# Patient Record
Sex: FEMALE | Race: WHITE | NOT HISPANIC OR LATINO | Employment: OTHER | URBAN - METROPOLITAN AREA
[De-identification: names, ages, dates, MRNs, and addresses within clinical notes are randomized per-mention and may not be internally consistent; named-entity substitution may affect disease eponyms.]

---

## 2017-05-15 ENCOUNTER — TELEPHONE (OUTPATIENT)
Dept: INTERNAL MEDICINE | Facility: CLINIC | Age: 67
End: 2017-05-15

## 2017-05-15 NOTE — TELEPHONE ENCOUNTER
**Vaccinated for measles? Unknown, had disease  **Internations travel in the past 30 days:  Yes, [place], [place]    Where to?  **Exposure to measles: no    Who:    When:  **Do you work in or go to a day care center? no  **Symptoms:     Fever  no  - How long?  - How high?  - What other symptoms along with the fever?    Rash  no  - How long?  - Unusual for you?  - Exposed to any new fabric or plants or laundry detergent etc?  - What does it look like?  - Where did it start? (concerning if started on the scalp)    Runny nose/Red eyes     no  - How long?  - Usual for you?  - Seasonal or chemical exposure?    Sore throat    no  - How long?  **Other considerations? cough    Recommendation:  Negative  continue scheduling    Negative  -  takes the call back    If positive: Contact FARHAT Montgomery (I.P. M-F 7-4:30, Vandana after 4:30 & W/E)  - I.P. -  [phone]  - Vandana - [phone]

## 2017-05-16 ENCOUNTER — OFFICE VISIT (OUTPATIENT)
Dept: FAMILY MEDICINE | Facility: CLINIC | Age: 67
End: 2017-05-16
Attending: FAMILY MEDICINE
Payer: COMMERCIAL

## 2017-05-16 VITALS
HEIGHT: 62 IN | SYSTOLIC BLOOD PRESSURE: 135 MMHG | HEART RATE: 67 BPM | BODY MASS INDEX: 30.73 KG/M2 | DIASTOLIC BLOOD PRESSURE: 71 MMHG | WEIGHT: 167 LBS

## 2017-05-16 DIAGNOSIS — H65.03 BILATERAL ACUTE SEROUS OTITIS MEDIA, RECURRENCE NOT SPECIFIED: ICD-10-CM

## 2017-05-16 DIAGNOSIS — J40 BRONCHITIS: Primary | ICD-10-CM

## 2017-05-16 PROCEDURE — 99212 OFFICE O/P EST SF 10 MIN: CPT | Mod: ZF

## 2017-05-16 RX ORDER — AMOXICILLIN 875 MG
875 TABLET ORAL 2 TIMES DAILY
Qty: 14 TABLET | Refills: 1 | Status: SHIPPED | OUTPATIENT
Start: 2017-05-16 | End: 2017-09-05

## 2017-05-16 ASSESSMENT — PAIN SCALES - GENERAL: PAINLEVEL: NO PAIN (0)

## 2017-05-16 NOTE — PROGRESS NOTES
Alaina is a 67 year old female who presents with plugged ears and worsening cough   HPI:  May 2nd flying home from [place] and ears plugged up and remain plugged.  Cough started a couple days before getting on return trip from [place].     Was in [place] and [place] in late April -Mary 2nd.     Still smoking    No fever or chills but cough is deeper    Doesn't feel wheezy  ROS:  General: none  Head/Eyes: none  Ears/Nose/Throat: ears plugged  Cardiovascular: none  Respiratory:cough  Gastrointestinal: none  Skin: none  Neurological: none  Mental Health: none  Endocrine: none  PROBLEM LIST:  Patient Active Problem List   Diagnosis     Carpal tunnel syndrome     Insomnia     Problem with sexual function     Hyperlipidemia LDL goal <130     Hyperlipidemia with target LDL less than 130     Nicotine addiction   OB/GYN HISTORY:   Obstetric History     No data available      PAST MEDICAL HISTORY:  Past Medical History:   Diagnosis Date     Basal cell carcinoma      Carpal tunnel syndrome      Hyperlipidemia LDL goal < 130      Insomnia, unspecified      Nicotine addiction      Problems with sexual function    Life Style Modifiers:   Tobacco:  reports that she has been smoking Cigarettes.  She has a 9.00 pack-year smoking history. She has never used smokeless tobacco.   Alcohol:  reports that she drinks alcohol.   Drug use:  reports that she uses illicit drugs, including Marijuana.  PAST SURGICAL HISTORY:  Past Surgical History:   Procedure Laterality Date     BIOPSY       MOHS MICROGRAPHIC PROCEDURE     FAMILY HISTORY:  Family History   Problem Relation Age of Onset     Arthritis Paternal Grandmother      Eye Disorder Paternal Grandmother      CANCER Father      lung at age 70     Hypertension Maternal Grandmother      DIABETES Maternal Grandmother      Melanoma No family hx of      Skin Cancer No family hx of    SOCIAL HISTORY:  Social History     Social History     Marital status:      Spouse name: N/A     Number of  "children: N/A     Years of education: N/A     Occupational History     Not on file.     Social History Main Topics     Smoking status: Light Tobacco Smoker     Packs/day: 0.30     Years: 30.00     Types: Cigarettes     Smokeless tobacco: Never Used      Comment: 2 packs per week     Alcohol use 0.0 oz/week     0 Standard drinks or equivalent per week      Comment: socially  3 nights a week      Drug use: Yes     Special: Marijuana     Sexual activity: No   MEDICATIONS:  Current Outpatient Prescriptions   Medication Sig Dispense Refill     clobetasol (TEMOVATE) 0.05 % ointment Apply as needed to affected areas of hands 2x daily 60 g 1     Calcium Carbonate-Vit D-Min (CALCIUM 600 + MINERALS PO) 1 tablet daily       zolpidem (AMBIEN) 10 MG tablet Take 0.5 tablets (5 mg) by mouth nightly as needed For sleep 30 tablet 1     B Complex Vitamins (VITAMIN B COMPLEX PO)        ascorbic acid (VITAMIN C) 500 MG tablet Take 1 tablet by mouth daily.     ALLERGIES:  No known allergies  VITALS:  /71  Pulse 67  Ht 5' 1.75\" (156.8 cm)  Wt 167 lb (75.8 kg)  BMI 30.79 kg/m2  PHYSICAL EXAM:  Constitutional: Well appearing woman in no acute distress.   Psychological: appropriate mood.  Eyes: anicteric, normal extra-ocular movements,  pupils are equal and reactive to light.   Ears, Nose and Throat: tympanic membranes with fluid behind TM.  Right is bulging  But no redness.  throat clear, moist mucous membrames, neck supple with full range of motion.  Neck: No thyroidmegaly. Cardiovascular: regular rate and rhythm, normal S1 and S2, no murmurs, rubs or gallops, peripheral pulses full and symmetric   Respiratory: clear to auscultation, no wheezes or crackles, normal breath sounds.  Musculoskeletal: full range of motion    Skin: no concerning lesions, no jaundice.  Neurological: normal gait, no tremor.   Diagnoses and associated orders for this visit:  There are no diagnoses linked to this encounter.  Diagnoses and associated " orders for this visit:  Bronchitis in a smoker  -     amoxicillin (AMOXIL) 875 MG tablet; Take 1 tablet (875 mg) by mouth 2 times daily  -    Encouraged discontinuation of  Cigarette smoking  Bilateral acute serous otitis media, recurrence not specified   -     OTOLARYNGOLOGY REFERRAL if not improved in 2 weeks.

## 2017-05-16 NOTE — LETTER
5/16/2017       RE: Deborah Schoenholz  [address]  [facility] [address]     Dear Colleague,    Thank you for referring your patient, Deborah Schoenholz, to the [facility] at [facility]. Please see a copy of my visit note below.    Alaina is a 67 year old female who presents with plugged ears and worsening cough   HPI:  May 2nd flying home from [place] and ears plugged up and remain plugged.  Cough started a couple days before getting on return trip from [place].     Was in [place] and [place] in late April -[place] 2nd.     Still smoking    No fever or chills but cough is deeper    Doesn't feel wheezy  ROS:  General: none  Head/Eyes: none  Ears/Nose/Throat: ears plugged  Cardiovascular: none  Respiratory:cough  Gastrointestinal: none  Skin: none  Neurological: none  Mental Health: none  Endocrine: none  PROBLEM LIST:  Patient Active Problem List   Diagnosis     Carpal tunnel syndrome     Insomnia     Problem with sexual function     Hyperlipidemia LDL goal <130     Hyperlipidemia with target LDL less than 130     Nicotine addiction   OB/GYN HISTORY:   Obstetric History     No data available      PAST MEDICAL HISTORY:  Past Medical History:   Diagnosis Date     Basal cell carcinoma      Carpal tunnel syndrome      Hyperlipidemia LDL goal < 130      Insomnia, unspecified      Nicotine addiction      Problems with sexual function    Life Style Modifiers:   Tobacco:  reports that she has been smoking Cigarettes.  She has a 9.00 pack-year smoking history. She has never used smokeless tobacco.   Alcohol:  reports that she drinks alcohol.   Drug use:  reports that she uses illicit drugs, including Marijuana.  PAST SURGICAL HISTORY:  Past Surgical History:   Procedure Laterality Date     BIOPSY       MOHS MICROGRAPHIC PROCEDURE     FAMILY HISTORY:  Family History   Problem Relation Age of Onset     Arthritis Paternal Grandmother      Eye Disorder Paternal  "Grandmother      CANCER Father      lung at age 70     Hypertension Maternal Grandmother      DIABETES Maternal Grandmother      Melanoma No family hx of      Skin Cancer No family hx of    SOCIAL HISTORY:  Social History     Social History     Marital status:      Spouse name: N/A     Number of children: N/A     Years of education: N/A     Occupational History     Not on file.     Social History Main Topics     Smoking status: Light Tobacco Smoker     Packs/day: 0.30     Years: 30.00     Types: Cigarettes     Smokeless tobacco: Never Used      Comment: 2 packs per week     Alcohol use 0.0 oz/week     0 Standard drinks or equivalent per week      Comment: socially  3 nights a week      Drug use: Yes     Special: Marijuana     Sexual activity: No   MEDICATIONS:  Current Outpatient Prescriptions   Medication Sig Dispense Refill     clobetasol (TEMOVATE) 0.05 % ointment Apply as needed to affected areas of hands 2x daily 60 g 1     Calcium Carbonate-Vit D-Min (CALCIUM 600 + MINERALS PO) 1 tablet daily       zolpidem (AMBIEN) 10 MG tablet Take 0.5 tablets (5 mg) by mouth nightly as needed For sleep 30 tablet 1     B Complex Vitamins (VITAMIN B COMPLEX PO)        ascorbic acid (VITAMIN C) 500 MG tablet Take 1 tablet by mouth daily.     ALLERGIES:  No known allergies  VITALS:  /71  Pulse 67  Ht 5' 1.75\" (156.8 cm)  Wt 167 lb (75.8 kg)  BMI 30.79 kg/m2  PHYSICAL EXAM:  Constitutional: Well appearing woman in no acute distress.   Psychological: appropriate mood.  Eyes: anicteric, normal extra-ocular movements,  pupils are equal and reactive to light.   Ears, Nose and Throat: tympanic membranes with fluid behind TM.  Right is bulging  But no redness.  throat clear, moist mucous membrames, neck supple with full range of motion.  Neck: No thyroidmegaly. Cardiovascular: regular rate and rhythm, normal S1 and S2, no murmurs, rubs or gallops, peripheral pulses full and symmetric   Respiratory: clear to " auscultation, no wheezes or crackles, normal breath sounds.  Musculoskeletal: full range of motion    Skin: no concerning lesions, no jaundice.  Neurological: normal gait, no tremor.   Diagnoses and associated orders for this visit:  There are no diagnoses linked to this encounter.  Diagnoses and associated orders for this visit:  Bronchitis in a smoker  -     amoxicillin (AMOXIL) 875 MG tablet; Take 1 tablet (875 mg) by mouth 2 times daily  -    Encouraged discontinuation of  Cigarette smoking  Bilateral acute serous otitis media, recurrence not specified   -     OTOLARYNGOLOGY REFERRAL if not improved in 2 weeks.             Again, thank you for allowing me to participate in the care of your patient.      Sincerely,    Анна Giraldo MD

## 2017-05-16 NOTE — MR AVS SNAPSHOT
After Visit Summary   5/16/2017    Deborah Schoenholz    MRN: [id number]           Patient Information     Date Of Birth          1950        Visit Information        Provider Department      5/16/2017 9:00 AM Анна Giraldo MD [facility]        Today's Diagnoses     Bronchitis    -  1    Bilateral acute serous otitis media, recurrence not specified          Care Instructions     [phone] ENT [[facility]]        Follow-ups after your visit        Additional Services     OTOLARYNGOLOGY REFERRAL       Your provider has referred you to: [facility]: Adult Ear, Nose [facility] Throat Clinic (Otolaryngology) [facility] [phone]  [email or web address]    Please be aware that coverage of these services is subject to the terms and limitations of your health insurance plan.  Call member services at your health plan with any benefit or coverage questions.      Please bring the following with you to your appointment:    (1) Any X-Rays, CTs or MRIs which have been performed.  Contact the facility where they were done to arrange for  prior to your scheduled appointment.   (2) List of current medications  (3) This referral request   (4) Any documents/labs given to you for this referral                  Who to contact     Please call your clinic at [phone] to:    Ask questions about your health    Make or cancel appointments    Discuss your medicines    Learn about your test results    Speak to your doctor   If you have compliments or concerns about an experience at your clinic, or if you wish to file a complaint, please contact [facility] Patient Relations at [phone] or email us at [email or web address]         Additional Information About Your Visit        MyChart Information     [employer] gives you secure access to your electronic health record. If you see a primary care provider, you can also  "send messages to your care team and make appointments. If you have questions, please call your primary care clinic.  If you do not have a primary care provider, please call [phone] and they will assist you.      [employer] is an electronic gateway that provides easy, online access to your medical records. With [employer], you can request a clinic appointment, read your test results, renew a prescription or communicate with your care team.     To access your existing account, please contact your [facility] or call [phone] for assistance.        Care EveryWhere ID     This is your Care EveryWhere ID. This could be used by other organizations to access your [place] medical records  [id number]        Your Vitals Were     Pulse Height BMI (Body Mass Index)             67 5' 1.75\" (156.8 cm) 30.79 kg/m2          Blood Pressure from Last 3 Encounters:   05/16/17 135/71   12/19/14 144/79   11/14/14 144/86    Weight from Last 3 Encounters:   05/16/17 167 lb (75.8 kg)   08/25/15 168 lb 6.4 oz (76.4 kg)   12/19/14 168 lb 11.2 oz (76.5 kg)              We Performed the Following     OTOLARYNGOLOGY REFERRAL          Today's Medication Changes          These changes are accurate as of: 5/16/17  9:26 AM.  If you have any questions, ask your nurse or doctor.               Start taking these medicines.        Dose/Directions    amoxicillin 875 MG tablet   Commonly known as:  AMOXIL   Used for:  Bronchitis   Started by:  Анна Giraldo MD        Dose:  875 mg   Take 1 tablet (875 mg) by mouth 2 times daily   Quantity:  14 tablet   Refills:  1            Where to get your medicines      These medications were sent to [facility] [address] [place] [address] AT [address] [address]     Phone:  [phone]     amoxicillin 875 MG tablet                Primary Care Provider Office Phone # Fax #    нАна Giarldo [phone] " [phone]       [facility] [address]  [facility] [address]        Thank you!     Thank you for choosing [facility]  for your care. Our goal is always to provide you with excellent care. Hearing back from our patients is one way we can continue to improve our services. Please take a few minutes to complete the written survey that you may receive in the mail after your visit with us. Thank you!             Your Updated Medication List - Protect others around you: Learn how to safely use, store and throw away your medicines at www.disposemymeds.org.          This list is accurate as of: 5/16/17  9:26 AM.  Always use your most recent med list.                   Brand Name Dispense Instructions for use    amoxicillin 875 MG tablet    AMOXIL    14 tablet    Take 1 tablet (875 mg) by mouth 2 times daily       ascorbic acid 500 MG tablet    VITAMIN C     Take 1 tablet by mouth daily.       CALCIUM 600 + MINERALS PO      1 tablet daily       clobetasol 0.05 % ointment    TEMOVATE    60 g    Apply as needed to affected areas of hands 2x daily       VITAMIN B COMPLEX PO          zolpidem 10 MG tablet    AMBIEN    30 tablet    Take 0.5 tablets (5 mg) by mouth nightly as needed For sleep

## 2017-08-22 ASSESSMENT — ENCOUNTER SYMPTOMS
DISTURBANCES IN COORDINATION: 0
PARALYSIS: 0
NAIL CHANGES: 0
MUSCLE WEAKNESS: 0
NUMBNESS: 1
MUSCLE CRAMPS: 1
LOSS OF CONSCIOUSNESS: 0
ARTHRALGIAS: 1
STIFFNESS: 1
BACK PAIN: 0
SPEECH CHANGE: 0
WEAKNESS: 0
MYALGIAS: 1
SKIN CHANGES: 0
TINGLING: 0
POOR WOUND HEALING: 0
DIZZINESS: 0
SEIZURES: 0
TREMORS: 0
NECK PAIN: 0
JOINT SWELLING: 0
HEADACHES: 0
MEMORY LOSS: 0

## 2017-08-22 ASSESSMENT — ANXIETY QUESTIONNAIRES
GAD7 TOTAL SCORE: 0
7. FEELING AFRAID AS IF SOMETHING AWFUL MIGHT HAPPEN: NOT AT ALL
5. BEING SO RESTLESS THAT IT IS HARD TO SIT STILL: NOT AT ALL
4. TROUBLE RELAXING: NOT AT ALL
3. WORRYING TOO MUCH ABOUT DIFFERENT THINGS: NOT AT ALL
7. FEELING AFRAID AS IF SOMETHING AWFUL MIGHT HAPPEN: NOT AT ALL
6. BECOMING EASILY ANNOYED OR IRRITABLE: NOT AT ALL
1. FEELING NERVOUS, ANXIOUS, OR ON EDGE: NOT AT ALL
GAD7 TOTAL SCORE: 0
GAD7 TOTAL SCORE: 0
2. NOT BEING ABLE TO STOP OR CONTROL WORRYING: NOT AT ALL

## 2017-08-23 ASSESSMENT — ANXIETY QUESTIONNAIRES: GAD7 TOTAL SCORE: 0

## 2017-09-05 ENCOUNTER — OFFICE VISIT (OUTPATIENT)
Dept: FAMILY MEDICINE | Facility: CLINIC | Age: 67
End: 2017-09-05
Attending: FAMILY MEDICINE
Payer: COMMERCIAL

## 2017-09-05 VITALS
BODY MASS INDEX: 30.36 KG/M2 | SYSTOLIC BLOOD PRESSURE: 154 MMHG | HEART RATE: 79 BPM | DIASTOLIC BLOOD PRESSURE: 79 MMHG | HEIGHT: 62 IN | WEIGHT: 165 LBS

## 2017-09-05 DIAGNOSIS — Z23 IMMUNIZATION DUE: ICD-10-CM

## 2017-09-05 DIAGNOSIS — Z00.00 ANNUAL PHYSICAL EXAM: Primary | ICD-10-CM

## 2017-09-05 DIAGNOSIS — R03.0 ELEVATED BP WITHOUT DIAGNOSIS OF HYPERTENSION: ICD-10-CM

## 2017-09-05 DIAGNOSIS — Z13.220 SCREENING FOR CHOLESTEROL LEVEL: ICD-10-CM

## 2017-09-05 DIAGNOSIS — Z12.11 COLON CANCER SCREENING: ICD-10-CM

## 2017-09-05 DIAGNOSIS — G47.00 PERSISTENT INSOMNIA: ICD-10-CM

## 2017-09-05 PROCEDURE — 99212 OFFICE O/P EST SF 10 MIN: CPT | Mod: ZF

## 2017-09-05 RX ORDER — ZOLPIDEM TARTRATE 5 MG/1
5 TABLET ORAL
Qty: 30 TABLET | Refills: 2 | Status: SHIPPED | OUTPATIENT
Start: 2017-09-05 | End: 2018-03-19

## 2017-09-05 ASSESSMENT — PAIN SCALES - GENERAL: PAINLEVEL: NO PAIN (0)

## 2017-09-05 NOTE — MR AVS SNAPSHOT
After Visit Summary   9/5/2017    Deborah Schoenholz    MRN: [id number]           Patient Information     Date Of Birth          1950        Visit Information        Provider Department      9/5/2017 3:40 PM Анна Giraldo MD [facility]        Today's Diagnoses     Annual physical exam    -  1    Elevated BP without diagnosis of hypertension        Colon cancer screening        Persistent insomnia        Immunization due        Screening for cholesterol level          Care Instructions    Nurse BP visit X 3    FIT    Mammogram:  [phone]    Labs          Follow-ups after your visit        Follow-up notes from your care team     Return for nurse visit X 3. .      Future tests that were ordered for you today     Open Future Orders        Priority Expected Expires Ordered    Basic Metabolic Panel Routine  9/5/2018 9/5/2017    Lipid Panel Routine  9/5/2018 9/5/2017    Fecal colorectal cancer screen FIT Routine 9/26/2017 9/5/2018 9/5/2017            Who to contact     Please call your clinic at [phone] to:    Ask questions about your health    Make or cancel appointments    Discuss your medicines    Learn about your test results    Speak to your doctor   If you have compliments or concerns about an experience at your clinic, or if you wish to file a complaint, please contact [facility] Patient Relations at [phone] or email us at [email or web address]         Additional Information About Your Visit        MyChart Information     [employer] gives you secure access to your electronic health record. If you see a primary care provider, you can also send messages to your care team and make appointments. If you have questions, please call your primary care clinic.  If you do not have a primary care provider, please call [phone] and they will assist you.      [employer] is an electronic gateway that provides easy, online access to  "your medical records. With [employer], you can request a clinic appointment, read your test results, renew a prescription or communicate with your care team.     To access your existing account, please contact your [facility] or call [phone] for assistance.        Care EveryWhere ID     This is your Care EveryWhere ID. This could be used by other organizations to access your [place] medical records  [id number]        Your Vitals Were     Pulse Height BMI (Body Mass Index)             79 1.568 m (5' 1.75\") 30.42 kg/m2          Blood Pressure from Last 3 Encounters:   09/05/17 154/79   05/16/17 135/71   12/19/14 144/79    Weight from Last 3 Encounters:   09/05/17 74.8 kg (165 lb)   05/16/17 75.8 kg (167 lb)   08/25/15 76.4 kg (168 lb 6.4 oz)                 Today's Medication Changes          These changes are accurate as of: 9/5/17  4:31 PM.  If you have any questions, ask your nurse or doctor.               These medicines have changed or have updated prescriptions.        Dose/Directions    zolpidem 5 MG tablet   Commonly known as:  AMBIEN   This may have changed:    - medication strength  - reasons to take this  - additional instructions   Used for:  Persistent insomnia   Changed by:  Анна Giraldo MD        Dose:  5 mg   Take 1 tablet (5 mg) by mouth nightly as needed for sleep   Quantity:  30 tablet   Refills:  2            Where to get your medicines      Some of these will need a paper prescription and others can be bought over the counter.  Ask your nurse if you have questions.     Bring a paper prescription for each of these medications     zolpidem 5 MG tablet                Primary Care Provider Office Phone # Fax #    Анна Giraldo [phone]       [address] [facility] [address]  [facility] [address]        Equal Access to Services     ANTONY FRYE AH: renae Bai, ely alvarez " jose wood ah. So [facility] [phone].    ATENCIÓN: Si jamie dwyer, tiene a garcia disposición servicios gratuitos de asistencia lingüística. Iliana al [phone].    We comply with applicable federal civil rights laws and [place] laws. We do not discriminate on the basis of race, color, national origin, age, disability sex, sexual orientation or gender identity.            Thank you!     Thank you for choosing [facility]  for your care. Our goal is always to provide you with excellent care. Hearing back from our patients is one way we can continue to improve our services. Please take a few minutes to complete the written survey that you may receive in the mail after your visit with us. Thank you!             Your Updated Medication List - Protect others around you: Learn how to safely use, store and throw away your medicines at www.disposemymeds.org.          This list is accurate as of: 9/5/17  4:31 PM.  Always use your most recent med list.                   Brand Name Dispense Instructions for use Diagnosis    ascorbic acid 500 MG tablet    VITAMIN C     Take 1 tablet by mouth daily.        CALCIUM 600 + MINERALS PO      1 tablet daily        clobetasol 0.05 % ointment    TEMOVATE    60 g    Apply as needed to affected areas of hands 2x daily    Dyshidrotic hand dermatitis       VITAMIN B COMPLEX PO           zolpidem 5 MG tablet    AMBIEN    30 tablet    Take 1 tablet (5 mg) by mouth nightly as needed for sleep    Persistent insomnia

## 2017-09-05 NOTE — LETTER
9/5/2017     RE: Deborah Schoenholz  [address]  [facility] [address]     Dear Colleague,    Thank you for referring your patient, Deborah Schoenholz, to the [facility] at [facility]. Please see a copy of my visit note below.    SUBJECTIVE:                                                            Deborah Schoenholz is a 67 year old female who presents for Preventive Visit.  Are you in the first 12 months of your Medicare Part B coverage?  No  Healthy Habits:    Do you get at least three servings of calcium containing foods daily (dairy, green leafy vegetables, etc.)-taking calcium and/or vitamin D supplement: yes    Amount of exercise or daily activities, outside of work: gardening in the summer. Gym in the winter.     Problems taking medications regularly No    Medication side effects: No    Have you had an eye exam in the past two years? yes    Do you see a dentist twice per year? no    Do you have sleep apnea, excessive snoring or daytime drowsiness?no  Other concerns to address:    Noted a elevated BP today     Needs refill on her Ambien: takes it for sleeping when traveling and prn    Nicotine addiction: Not ready to stop smoking 6 cig/day.   Social History   Substance Use Topics     Smoking status: Light Tobacco Smoker     Packs/day: 0.30     Years: 35.00     Types: Cigarettes     Smokeless tobacco: Never Used      Comment: 2 packs per week     Alcohol use 0.0 oz/week      Comment: one or two drinks three or four nights a week     The patient does not drink >3 drinks per day nor >7 drinks per week.  Today's PHQ-2 Score:   PHQ-2 ( 1999 Pfizer) 8/22/2017 8/18/2016   Q1: Little interest or pleasure in doing things 0 -   Q2: Feeling down, depressed or hopeless 0 -   PHQ-2 Score 0 -   Q1: Little interest or pleasure in doing things Not at all Not at all   Q2: Feeling down, depressed or hopeless Not at all Not at all   PHQ-2 Score 0 0   Do  you feel safe in your environment - Yes  Do you have a Health Care Directive?: Yes: Patient states has Advance Directive and will bring in a copy to clinic.  Current providers sharing in care for this patient include:   Patient Care Team:  Анна Giraldo MD as PCP - General (Family Practice)  Jose Laura MD as MD (Dermatology)    Hearing impairment: No    Ability to successfully perform activities of daily living: Yes, no assistance needed     Fall risk:Home safety:  none identified  Health Maintenance   Topic Date Due     TETANUS IMMUNIZATION (SYSTEM ASSIGNED)  1967     HEPATITIS C SCREENING  1967     MAMMO SCREEN Q2 YR (SYSTEM ASSIGNED)  1999     COLON CANCER SCREEN (SYSTEM ASSIGNED)  1999     ADVANCE DIRECTIVE PLANNING Q5 YRS  2004     FALL RISK ASSESSMENT  2014     PNEUMOCOCCAL (1 of 2 - PCV13) 2014     INFLUENZA VACCINE (SYSTEM ASSIGNED)  2017     LIPID SCREEN Q5 YR FEMALE (SYSTEM ASSIGNED)  2020     DEXA SCAN SCREENING (SYSTEM ASSIGNED)  Completed   ROS:  C: NEGATIVE for fever, chills, change in weight  E/M: NEGATIVE for ear, mouth and throat problems  R: NEGATIVE for significant cough or SOB  CV: NEGATIVE for chest pain, palpitations or peripheral edema  PAST OB/GYN History:   1)  1.  Post menopausal.  Not on HRT.  Rarely has [place].   Past Medical History   Diagnosis Date     Carpal tunnel syndrome      Insomnia, unspecified      Problems with sexual function      Hyperlipidemia LDL goal < 130      Nicotine addiction      Basal cell carcinoma    Life Style Modifiers:   Tobacco: 2 pp/week. 6-7/day.  Alcohol: two cocktails 3-4 night a week.   Exercise: Garden all summer and then limited aerobic and less walking and swimming.                   Diet: Good.  Environment:     No secondhand tobacco smoke in home.     Abuse / neglect: No abuse/neglect at home.     Using seatbelts.  Supplements: Vitamin D, Vitamin C and calcium  PAST  "SURGICAL HISTORY:  Procedure Laterality Date     Mohs micrographic procedure     FAMILY HISTORY:  Problem Relation Age of Onset     DIABETES Maternal Grandmother      Hypertension Maternal Grandmother      Arthritis Paternal Grandmother      CANCER Father      lung at age 70     Eye Disorder Paternal Grandmother    SOCIAL HISTORY:  . 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   Current Outpatient Prescriptions   Medication Sig Dispense Refill     zolpidem (AMBIEN) 10 MG tablet Take 0.5 tablets (5 mg) by mouth nightly as needed For sleep 30 tablet 1     B Complex Vitamins (VITAMIN B COMPLEX PO)        calcium carbonate 500 MG tablet Take 1 tablet by mouth 2 times daily.       ascorbic acid (VITAMIN C) 500 MG tablet Take 1 tablet by mouth daily.     No known allergies  VITALS:  /79 (BP Location: Left arm, Patient Position: Chair, Cuff Size: Adult Regular)  Pulse 79  Ht 1.568 m (5' 1.75\")  Wt 74.8 kg (165 lb)  BMI 30.42 kg/m2  PHYSICAL EXAM:  Alert and oriented X 3 with no into evidence of memory loss  Constitutional: Women in no acute distress.   Psychological: appropriate mood.  Eyes: anicteric, normal extra-ocular movements.   Cardiovascular: regular rate and rhythm, normal S1 and S2, no murmurs, rubs or gallops, peripheral pulses full and symmetric. Respiratory: few distant wheezes in the right base. Normal breath sounds.  Breast: Symmetrical without visible distortion or swelling. No masses noted. No nipple inversion, no breast dimpling or puckering. Axillary area without masses or lympadenapathy.   Gastrointestinal: positive bowel sounds, nontender, no hepatosplenomegaly, no masses. No guarding or rebound.  Genitourinary: N/A  Lymphatic: no lymphadenopathy.  Musculoskeletal: full range of motion    Skin: no concerning lesions, no jaundice.  Neurological: normal gait, no tremor.   OBJECTIVE:                                                            BP " "154/79 (BP Location: Left arm, Patient Position: Chair, Cuff Size: Adult Regular)  Pulse 79  Ht 1.568 m (5' 1.75\")  Wt 74.8 kg (165 lb)  BMI 30.42 kg/m2 Estimated body mass index is 30.79 kg/(m^2) as calculated from the following:    Height as of 5/16/17: 1.568 m (5' 1.75\").    Weight as of 5/16/17: 75.8 kg (167 lb).   Wt Readings from Last 5 Encounters:   09/05/17 74.8 kg (165 lb)   05/16/17 75.8 kg (167 lb)   08/25/15 76.4 kg (168 lb 6.4 oz)   12/19/14 76.5 kg (168 lb 11.2 oz)   06/03/14 73.8 kg (162 lb 12.8 oz)     BP Readings from Last 6 Encounters:   09/05/17 154/79   05/16/17 135/71   12/19/14 144/79   11/14/14 144/86   06/03/14 124/77   04/18/14 131/77   PHYSICAL EXAM:  Alert and oriented X 3 with no into evidence of memory loss  Constitutional: Women in no acute distress.   Psychological: appropriate mood.  Eyes: anicteric, normal extra-ocular movements.   Cardiovascular: regular rate and rhythm, normal S1 and S2, no murmurs, rubs or gallops, peripheral pulses full and symmetric. Respiratory: few distant wheezes in the right base. Normal breath sounds.  Breast: Symmetrical without visible distortion or swelling. No masses noted. No nipple inversion, no breast dimpling or puckering. Axillary area without masses or lympadenapathy.   Gastrointestinal: positive bowel sounds, nontender, no hepatosplenomegaly, no masses. No guarding or rebound.  Genitourinary: N/A  Lymphatic: no lymphadenopathy.  Musculoskeletal: full range of motion    Skin: no concerning lesions, no jaundice.  Neurological: normal gait, no tremor.   ASSESSMENT / PLAN:                                                            Diagnoses and associated orders for this visit:  Annual physical exam   - Annual well exam with no need for pap smear.    -  Mammogram encouraged    -  Immunization declined.   Elevated BP without diagnosis of hypertension    Nurse visit X 3 for BP checks - if > 140's follow-up    Lose 5 pounds and recheck  Colon cancer " "screening  -  Fecal colorectal cancer screen FIT given to patient   Nicotine addiction  -  Encouraged discontinuation of cigarettes (not ready to stop).   Transient insomnia  -  RX for Ambien given to patient: Epic was down so a written RX was given to patient  Screening for cholesterol level  -     Lipid Profile; Future  Screening for diabetes mellitus  -     Basic Metabolic Panel; Future  End of Life Planning:  Patient currently has an advanced directive: Yes.  Practitioner is supportive of decision.  COUNSELING:  Reviewed preventive health counseling, as reflected in patient instructions       Regular exercise  Estimated body mass index is 30.79 kg/(m^2) as calculated from the following:    Height as of 5/16/17: 1.568 m (5' 1.75\").    Weight as of 5/16/17: 75.8 kg (167 lb).  Weight management plan: exercise plan   reports that she has been smoking Cigarettes.  She has a 9.00 pack-year smoking history. She has never used smokeless tobacco.  Tobacco Cessation Action Plan: Information offered: Patient not interested at this time  Appropriate preventive services were discussed with this patient, including applicable screening as appropriate for cardiovascular disease, diabetes, osteopenia/osteoporosis, and glaucoma.  As appropriate for age/gender, discussed screening for colorectal cancer, prostate cancer, breast cancer, and cervical cancer. Checklist reviewing preventive services available has been given to the patient.  Reviewed patients plan of care and provided an AVS. The Basic Care Plan (routine screening as documented in Health Maintenance) for Alaina meets the Care Plan requirement. This Care Plan has been established and reviewed with the Patient.  Counseling Resources:  ATP IV Guidelines  Pooled Cohorts Equation Calculator  Breast Cancer Risk Calculator  FRAX Risk Assessment  ICSI Preventive Guidelines  Dietary Guidelines for Americans, 2010  USDA's MyPlate  ASA Prophylaxis  Lung CA Screening  Анна" MD Enzo  [facility]

## 2017-09-05 NOTE — PROGRESS NOTES
SUBJECTIVE:                                                            Deborah Schoenholz is a 67 year old female who presents for Preventive Visit.  Are you in the first 12 months of your Medicare Part B coverage?  No  Healthy Habits:    Do you get at least three servings of calcium containing foods daily (dairy, green leafy vegetables, etc.)-taking calcium and/or vitamin D supplement: yes    Amount of exercise or daily activities, outside of work: gardening in the summer. Gym in the winter.     Problems taking medications regularly No    Medication side effects: No    Have you had an eye exam in the past two years? yes    Do you see a dentist twice per year? no    Do you have sleep apnea, excessive snoring or daytime drowsiness?no  Other concerns to address:    Noted a elevated BP today     Needs refill on her Ambien: takes it for sleeping when traveling and prn    Nicotine addiction: Not ready to stop smoking 6 cig/day.   Social History   Substance Use Topics     Smoking status: Light Tobacco Smoker     Packs/day: 0.30     Years: 35.00     Types: Cigarettes     Smokeless tobacco: Never Used      Comment: 2 packs per week     Alcohol use 0.0 oz/week      Comment: one or two drinks three or four nights a week     The patient does not drink >3 drinks per day nor >7 drinks per week.  Today's PHQ-2 Score:   PHQ-2 ( 1999 Pfizer) 8/22/2017 8/18/2016   Q1: Little interest or pleasure in doing things 0 -   Q2: Feeling down, depressed or hopeless 0 -   PHQ-2 Score 0 -   Q1: Little interest or pleasure in doing things Not at all Not at all   Q2: Feeling down, depressed or hopeless Not at all Not at all   PHQ-2 Score 0 0   Do you feel safe in your environment - Yes  Do you have a Health Care Directive?: Yes: Patient states has Advance Directive and will bring in a copy to clinic.  Current providers sharing in care for this patient include:   Patient Care Team:  Анна Giraldo MD as PCP - General (Family  Practice)  Jose Laura MD as MD (Dermatology)    Hearing impairment: No    Ability to successfully perform activities of daily living: Yes, no assistance needed     Fall risk:Home safety:  none identified  Health Maintenance   Topic Date Due     TETANUS IMMUNIZATION (SYSTEM ASSIGNED)  1967     HEPATITIS C SCREENING  1967     MAMMO SCREEN Q2 YR (SYSTEM ASSIGNED)  1999     COLON CANCER SCREEN (SYSTEM ASSIGNED)  1999     ADVANCE DIRECTIVE PLANNING Q5 YRS  2004     FALL RISK ASSESSMENT  2014     PNEUMOCOCCAL (1 of 2 - PCV13) 2014     INFLUENZA VACCINE (SYSTEM ASSIGNED)  2017     LIPID SCREEN Q5 YR FEMALE (SYSTEM ASSIGNED)  2020     DEXA SCAN SCREENING (SYSTEM ASSIGNED)  Completed   ROS:  C: NEGATIVE for fever, chills, change in weight  E/M: NEGATIVE for ear, mouth and throat problems  R: NEGATIVE for significant cough or SOB  CV: NEGATIVE for chest pain, palpitations or peripheral edema  PAST OB/GYN History:   1)  1.  Post menopausal.  Not on HRT.  Rarely has [place].   Past Medical History   Diagnosis Date     Carpal tunnel syndrome      Insomnia, unspecified      Problems with sexual function      Hyperlipidemia LDL goal < 130      Nicotine addiction      Basal cell carcinoma    Life Style Modifiers:   Tobacco: 2 pp/week. 6-7/day.  Alcohol: two cocktails 3-4 night a week.   Exercise: Garden all summer and then limited aerobic and less walking and swimming.                   Diet: Good.  Environment:     No secondhand tobacco smoke in home.     Abuse / neglect: No abuse/neglect at home.     Using seatbelts.  Supplements: Vitamin D, Vitamin C and calcium  PAST SURGICAL HISTORY:  Procedure Laterality Date     Mohs micrographic procedure     FAMILY HISTORY:  Problem Relation Age of Onset     DIABETES Maternal Grandmother      Hypertension Maternal Grandmother      Arthritis Paternal Grandmother      CANCER Father      lung at age 70     Eye Disorder  "Paternal Grandmother    SOCIAL HISTORY:  . 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   Current Outpatient Prescriptions   Medication Sig Dispense Refill     zolpidem (AMBIEN) 10 MG tablet Take 0.5 tablets (5 mg) by mouth nightly as needed For sleep 30 tablet 1     B Complex Vitamins (VITAMIN B COMPLEX PO)        calcium carbonate 500 MG tablet Take 1 tablet by mouth 2 times daily.       ascorbic acid (VITAMIN C) 500 MG tablet Take 1 tablet by mouth daily.     No known allergies  VITALS:  /79 (BP Location: Left arm, Patient Position: Chair, Cuff Size: Adult Regular)  Pulse 79  Ht 1.568 m (5' 1.75\")  Wt 74.8 kg (165 lb)  BMI 30.42 kg/m2  PHYSICAL EXAM:  Alert and oriented X 3 with no into evidence of memory loss  Constitutional: Women in no acute distress.   Psychological: appropriate mood.  Eyes: anicteric, normal extra-ocular movements.   Cardiovascular: regular rate and rhythm, normal S1 and S2, no murmurs, rubs or gallops, peripheral pulses full and symmetric. Respiratory: few distant wheezes in the right base. Normal breath sounds.  Breast: Symmetrical without visible distortion or swelling. No masses noted. No nipple inversion, no breast dimpling or puckering. Axillary area without masses or lympadenapathy.   Gastrointestinal: positive bowel sounds, nontender, no hepatosplenomegaly, no masses. No guarding or rebound.  Genitourinary: N/A  Lymphatic: no lymphadenopathy.  Musculoskeletal: full range of motion    Skin: no concerning lesions, no jaundice.  Neurological: normal gait, no tremor.   OBJECTIVE:                                                            /79 (BP Location: Left arm, Patient Position: Chair, Cuff Size: Adult Regular)  Pulse 79  Ht 1.568 m (5' 1.75\")  Wt 74.8 kg (165 lb)  BMI 30.42 kg/m2 Estimated body mass index is 30.79 kg/(m^2) as calculated from the following:    Height as of 5/16/17: 1.568 m (5' 1.75\").    Weight as of " 5/16/17: 75.8 kg (167 lb).   Wt Readings from Last 5 Encounters:   09/05/17 74.8 kg (165 lb)   05/16/17 75.8 kg (167 lb)   08/25/15 76.4 kg (168 lb 6.4 oz)   12/19/14 76.5 kg (168 lb 11.2 oz)   06/03/14 73.8 kg (162 lb 12.8 oz)     BP Readings from Last 6 Encounters:   09/05/17 154/79   05/16/17 135/71   12/19/14 144/79   11/14/14 144/86   06/03/14 124/77   04/18/14 131/77   PHYSICAL EXAM:  Alert and oriented X 3 with no into evidence of memory loss  Constitutional: Women in no acute distress.   Psychological: appropriate mood.  Eyes: anicteric, normal extra-ocular movements.   Cardiovascular: regular rate and rhythm, normal S1 and S2, no murmurs, rubs or gallops, peripheral pulses full and symmetric. Respiratory: few distant wheezes in the right base. Normal breath sounds.  Breast: Symmetrical without visible distortion or swelling. No masses noted. No nipple inversion, no breast dimpling or puckering. Axillary area without masses or lympadenapathy.   Gastrointestinal: positive bowel sounds, nontender, no hepatosplenomegaly, no masses. No guarding or rebound.  Genitourinary: N/A  Lymphatic: no lymphadenopathy.  Musculoskeletal: full range of motion    Skin: no concerning lesions, no jaundice.  Neurological: normal gait, no tremor.   ASSESSMENT / PLAN:                                                            Diagnoses and associated orders for this visit:  Annual physical exam   - Annual well exam with no need for pap smear.    -  Mammogram encouraged    -  Immunization declined.   Elevated BP without diagnosis of hypertension    Nurse visit X 3 for BP checks - if > 140's follow-up    Lose 5 pounds and recheck  Colon cancer screening  -  Fecal colorectal cancer screen FIT given to patient   Nicotine addiction  -  Encouraged discontinuation of cigarettes (not ready to stop).   Transient insomnia  -  RX for Ambien given to patient: Epic was down so a written RX was given to patient  Screening for cholesterol  "level  -     Lipid Profile; Future  Screening for diabetes mellitus  -     Basic Metabolic Panel; Future  End of Life Planning:  Patient currently has an advanced directive: Yes.  Practitioner is supportive of decision.  COUNSELING:  Reviewed preventive health counseling, as reflected in patient instructions       Regular exercise  Estimated body mass index is 30.79 kg/(m^2) as calculated from the following:    Height as of 5/16/17: 1.568 m (5' 1.75\").    Weight as of 5/16/17: 75.8 kg (167 lb).  Weight management plan: exercise plan   reports that she has been smoking Cigarettes.  She has a 9.00 pack-year smoking history. She has never used smokeless tobacco.  Tobacco Cessation Action Plan: Information offered: Patient not interested at this time  Appropriate preventive services were discussed with this patient, including applicable screening as appropriate for cardiovascular disease, diabetes, osteopenia/osteoporosis, and glaucoma.  As appropriate for age/gender, discussed screening for colorectal cancer, prostate cancer, breast cancer, and cervical cancer. Checklist reviewing preventive services available has been given to the patient.  Reviewed patients plan of care and provided an AVS. The Basic Care Plan (routine screening as documented in Health Maintenance) for Alaina meets the Care Plan requirement. This Care Plan has been established and reviewed with the Patient.  Counseling Resources:  ATP IV Guidelines  Pooled Cohorts Equation Calculator  Breast Cancer Risk Calculator  FRAX Risk Assessment  ICSI Preventive Guidelines  Dietary Guidelines for Americans, 2010  USDA's MyPlate  ASA Prophylaxis  Lung CA Screening  Анна Giraldo MD  [facility]  "

## 2017-09-25 ENCOUNTER — RADIANT APPOINTMENT (OUTPATIENT)
Dept: MAMMOGRAPHY | Facility: CLINIC | Age: 67
End: 2017-09-25
Attending: FAMILY MEDICINE
Payer: COMMERCIAL

## 2017-09-25 ENCOUNTER — ALLIED HEALTH/NURSE VISIT (OUTPATIENT)
Dept: OBGYN | Facility: CLINIC | Age: 67
End: 2017-09-25
Payer: COMMERCIAL

## 2017-09-25 VITALS — SYSTOLIC BLOOD PRESSURE: 112 MMHG | DIASTOLIC BLOOD PRESSURE: 72 MMHG

## 2017-09-25 DIAGNOSIS — Z12.31 VISIT FOR SCREENING MAMMOGRAM: ICD-10-CM

## 2017-09-25 DIAGNOSIS — Z01.30 BP CHECK: Primary | ICD-10-CM

## 2017-09-25 PROCEDURE — G0202 SCR MAMMO BI INCL CAD: HCPCS

## 2017-09-25 PROCEDURE — 99212 OFFICE O/P EST SF 10 MIN: CPT | Mod: ZF

## 2017-09-25 NOTE — MR AVS SNAPSHOT
After Visit Summary   9/25/2017    Deborah Schoenholz    MRN: [id number]           Patient Information     Date Of Birth          1950        Visit Information        Provider Department      9/25/2017 8:30 AM Nurse, [facility]        Today's Diagnoses     BP check    -  1       Follow-ups after your visit        Who to contact     Please call your clinic at [phone] to:    Ask questions about your health    Make or cancel appointments    Discuss your medicines    Learn about your test results    Speak to your doctor   If you have compliments or concerns about an experience at your clinic, or if you wish to file a complaint, please contact [facility] Patient Relations at [phone] or email us at [email or web address]         Additional Information About Your Visit        MyChart Information     [employer] gives you secure access to your electronic health record. If you see a primary care provider, you can also send messages to your care team and make appointments. If you have questions, please call your primary care clinic.  If you do not have a primary care provider, please call [phone] and they will assist you.      [employer] is an electronic gateway that provides easy, online access to your medical records. With [employer] [facility], you can request a clinic appointment, read your test results, renew a prescription or communicate with your care team.     To access your existing account, please contact your [facility] or call [phone] for assistance.        Care EveryWhere ID     This is your Care EveryWhere ID. This could be used by other organizations to access your [place] medical records  [id number]         Blood Pressure from Last 3 Encounters:   09/25/17 112/72   09/05/17 154/79   05/16/17 135/71    Weight from Last 3 Encounters:   09/05/17 74.8 kg (165 lb)   05/16/17 75.8 kg (167 lb)    08/25/15 76.4 kg (168 lb 6.4 oz)              Today, you had the following     No orders found for display       Primary Care Provider Office Phone # Fax #    Анна Giraldo [phone]       [address] [facility] [address]  [facility] [address]        Equal Access to Services     ALECGAGANDEEP MELVA : Hadii aad ku hadasho Soomaali, waaxda luqadaha, qaybta kaalmada adeegyada, waxay idiin hayaan adeeg jose laCarrierudyn . So [facility] [phone].    ATENCIÓN: Si habla español, tiene a garcia disposición servicios gratuitos de asistencia lingüística. Llame al [phone].    We comply with applicable federal civil rights laws and [place] laws. We do not discriminate on the basis of race, color, national origin, age, disability sex, sexual orientation or gender identity.            Thank you!     Thank you for choosing [facility]  for your care. Our goal is always to provide you with excellent care. Hearing back from our patients is one way we can continue to improve our services. Please take a few minutes to complete the written survey that you may receive in the mail after your visit with us. Thank you!             Your Updated Medication List - Protect others around you: Learn how to safely use, store and throw away your medicines at www.disposemymeds.org.          This list is accurate as of: 9/25/17  9:16 AM.  Always use your most recent med list.                   Brand Name Dispense Instructions for use Diagnosis    ascorbic acid 500 MG tablet    VITAMIN C     Take 1 tablet by mouth daily.        CALCIUM 600 + MINERALS PO      1 tablet daily        clobetasol 0.05 % ointment    TEMOVATE    60 g    Apply as needed to affected areas of hands 2x daily    Dyshidrotic hand dermatitis       VITAMIN B COMPLEX PO           zolpidem 5 MG tablet    AMBIEN    30 tablet    Take 1 tablet (5 mg) by mouth nightly as needed for sleep    Persistent insomnia

## 2018-03-19 DIAGNOSIS — G47.00 PERSISTENT INSOMNIA: ICD-10-CM

## 2018-03-19 NOTE — TELEPHONE ENCOUNTER
Patient calling for refill request of Ambien. Patient had recent annual appt in September 2017. Will route to Dr. Giraldo for approval.

## 2018-03-20 RX ORDER — ZOLPIDEM TARTRATE 5 MG/1
5 TABLET ORAL
Qty: 30 TABLET | Refills: 1 | Status: SHIPPED | OUTPATIENT
Start: 2018-03-20 | End: 2018-11-27

## 2018-07-24 ENCOUNTER — TELEPHONE (OUTPATIENT)
Dept: FAMILY MEDICINE | Facility: CLINIC | Age: 68
End: 2018-07-24

## 2018-07-24 NOTE — TELEPHONE ENCOUNTER
----- Message from Marion Benavides sent at 7/23/2018 11:27 AM CDT -----  Regarding: Pt of Dr. Torkelson calling looking for referrals, pt would like a call back  Contact: [phone]  Pt calling for a referral for distal neuropathy   Please call the pt back with recommendations.      Thank you,    Eilf RINALDI  Call Ctr    Please DO NOT send this message and/or reply back to sender.  Call Center Representatives DO NOT respond to messages.            I called her and talked to her more about her Neuropathy.  Numbness in her fingers both hands, but  worse on the Right.  Has had this for a long time and she feels it is getting worse.  Acupuncture didn't work.  What else can she do?  Wants to be sent to a neurologist.  Referral?  First name Danish is a 4 year old female

## 2018-07-25 DIAGNOSIS — R20.0 NUMBNESS OF FINGERS OF BOTH HANDS: Primary | ICD-10-CM

## 2018-07-25 DIAGNOSIS — R20.0 BILATERAL HAND NUMBNESS: ICD-10-CM

## 2018-07-25 NOTE — TELEPHONE ENCOUNTER
Анна Giraldo MD  [facility] Nurse Pool        Caller: Unspecified (Yesterday, 11:37 AM)                      Yes, put in a referral for neurology          Referral placed  Pt contacted with number to call for appt, [phone]    Pt appreciative and agrees to plan    Sakina Sutherland,RN  July 25, 2018 10:48 AM

## 2018-08-07 ASSESSMENT — ENCOUNTER SYMPTOMS
SKIN CHANGES: 0
POOR WOUND HEALING: 0
NAIL CHANGES: 0

## 2018-08-15 NOTE — TELEPHONE ENCOUNTER
FUTURE VISIT INFORMATION      FUTURE VISIT INFORMATION:    Date: 08/21/2018    Time: 9:00 am     [place] [facility] [place] INFORMATION:    Referring provider:  ROMERO PELAYO    Referring providers clinic:      Reason for visit/diagnosis        NOTES (FOR ALL VISITS) STATUS DETAILS   OFFICE NOTE from referring provider Internal 07/25/2018   OFFICE NOTE from other specialist Internal 07/24/2018   DISCHARGE SUMMARY from hospital N/A    DISCHARGE REPORT from the ER Internal 09/23/2009   OPERATIVE REPORT N/A    MEDICATION LIST Internal    IMAGING  (FOR ALL VISITS)     EMG Internal 07/25/2018, 07/24/2018, 09/21/2017, 09/05/2017   EEG N/A    ECT N/A    MRI (HEAD, NECK, SPINE) N/A    CT (HEAD, NECK, SPINE) N/A    OTHER

## 2018-08-21 ENCOUNTER — PRE VISIT (OUTPATIENT)
Dept: NEUROLOGY | Facility: CLINIC | Age: 68
End: 2018-08-21

## 2018-08-21 ENCOUNTER — PATIENT OUTREACH (OUTPATIENT)
Dept: CARE COORDINATION | Facility: CLINIC | Age: 68
End: 2018-08-21

## 2018-08-21 ENCOUNTER — OFFICE VISIT (OUTPATIENT)
Dept: NEUROLOGY | Facility: CLINIC | Age: 68
End: 2018-08-21
Payer: COMMERCIAL

## 2018-08-21 VITALS
BODY MASS INDEX: 27.94 KG/M2 | OXYGEN SATURATION: 97 % | SYSTOLIC BLOOD PRESSURE: 145 MMHG | RESPIRATION RATE: 20 BRPM | HEIGHT: 62 IN | HEART RATE: 79 BPM | WEIGHT: 151.8 LBS | DIASTOLIC BLOOD PRESSURE: 79 MMHG

## 2018-08-21 DIAGNOSIS — G56.01 CARPAL TUNNEL SYNDROME OF RIGHT WRIST: Primary | ICD-10-CM

## 2018-08-21 ASSESSMENT — PAIN SCALES - GENERAL: PAINLEVEL: NO PAIN (0)

## 2018-08-21 NOTE — MR AVS SNAPSHOT
After Visit Summary   8/21/2018    Deborah Schoenholz    MRN: [id number]           Patient Information     Date Of Birth          1950        Visit Information        Provider Department      8/21/2018 9:00 AM Mack Antunez MD [facility] Neurology        Today's Diagnoses     Carpal tunnel syndrome of right wrist    -  1       Follow-ups after your visit        Additional Services     ORTHOPEDICS ADULT REFERRAL       Your provider has referred you to: carpal tunnel release right. orthopedics surgery referral  Please be aware that coverage of these services is subject to the terms and limitations of your health insurance plan.  Call member services at your health plan with any benefit or coverage questions.      Please bring the following to your appointment:    >>   Any x-rays, CTs or MRIs which have been performed.  Contact the facility where they were done to arrange for  prior to your scheduled appointment.    >>   List of current medications   >>   This referral request   >>   Any documents/labs given to you for this referral                  Who to contact     Please call your clinic at [phone] to:    Ask questions about your health    Make or cancel appointments    Discuss your medicines    Learn about your test results    Speak to your doctor            Additional Information About Your Visit        MyChart Information     [employer] gives you secure access to your electronic health record. If you see a primary care provider, you can also send messages to your care team and make appointments. If you have questions, please call your primary care clinic.  If you do not have a primary care provider, please call [phone] and they will assist you.      [employer] is an electronic gateway that provides easy, online access to your medical records. With [employer], you can request a clinic appointment, read your test results, renew a prescription or communicate with your care team.     To  "access your existing account, please contact your [facility] or call [phone] for assistance.        Care EveryWhere ID     This is your Care EveryWhere ID. This could be used by other organizations to access your [place] medical records  [id number]        Your Vitals Were     Pulse Respirations Height Pulse Oximetry BMI (Body Mass Index)       79 20 1.568 m (5' 1.75\") 97% 27.99 kg/m2        Blood Pressure from Last 3 Encounters:   08/21/18 145/79   09/25/17 112/72   09/05/17 154/79    Weight from Last 3 Encounters:   08/21/18 68.9 kg (151 lb 12.8 oz)   09/05/17 74.8 kg (165 lb)   05/16/17 75.8 kg (167 lb)              We Performed the Following     ORTHOPEDICS ADULT REFERRAL        Primary Care Provider Office Phone # Fax #    Анна Giraldo [phone]       [address] [facility] [address]  [place] [address]        Equal Access to Services     ANTONY FRYE AH: Hadii aad ku hadasho Soomaali, waaxda luqadaha, qaybta kaalmada adeegyada, waxay idiin hayrudyn shelbi wood . So [facility] [phone].    ATENCIÓN: Si habla español, tiene a garcia disposición servicios gratuitos de asistencia lingüística. [facility] [phone].    We comply with applicable federal civil rights laws and [place] laws. We do not discriminate on the basis of race, color, national origin, age, disability, sex, sexual orientation, or gender identity.            Thank you!     Thank you for choosing [facility] NEUROLOGY  for your care. Our goal is always to provide you with excellent care. Hearing back from our patients is one way we can continue to improve our services. Please take a few minutes to complete the written survey that you may receive in the mail after your visit with us. Thank you!             Your Updated Medication List - Protect others around you: Learn how to safely use, store and throw away your medicines at www.disposemymeds.org.          This list is accurate as of 8/21/18 " 11:59 PM.  Always use your most recent med list.                   Brand Name Dispense Instructions for use Diagnosis    ascorbic acid 500 MG tablet    VITAMIN C     Take 1 tablet by mouth daily.        CALCIUM 600 + MINERALS PO      1 tablet daily        clobetasol 0.05 % ointment    TEMOVATE    60 g    Apply as needed to affected areas of hands 2x daily    Dyshidrotic hand dermatitis       VITAMIN B COMPLEX PO           zolpidem 5 MG tablet    AMBIEN    30 tablet    Take 1 tablet (5 mg) by mouth nightly as needed for sleep    Persistent insomnia

## 2018-08-21 NOTE — PROGRESS NOTES
Answers for HPI/ROS submitted by the patient on 8/7/2018   General Symptoms: No  Skin Symptoms: Yes  HENT Symptoms: No  EYE SYMPTOMS: No  HEART SYMPTOMS: No  LUNG SYMPTOMS: No  INTESTINAL SYMPTOMS: No  URINARY SYMPTOMS: No  GYNECOLOGIC SYMPTOMS: No  BREAST SYMPTOMS: No  SKELETAL SYMPTOMS: No  BLOOD SYMPTOMS: No  NERVOUS SYSTEM SYMPTOMS: No  MENTAL HEALTH SYMPTOMS: No  Changes in hair: No  Changes in moles/birth marks: No  Itching: Yes  Rashes: Yes  Changes in nails: No  Acne: No  Hair in places you don't want it: No  Change in facial hair: No  Warts: No  Non-healing sores: No  Scarring: No  Flaking of skin: No  Color changes of hands/feet in cold : No  Sun sensitivity: No  Skin thickening: No

## 2018-08-21 NOTE — NURSING NOTE
Chief Complaint   Patient presents with     Consult     [facility] NEW - CONSULT     Jolanta Montero MA

## 2018-08-21 NOTE — PROGRESS NOTES
Service Date: 2018      Анна Giraldo MD   [facility]    [address] [address]      RE: Deborah Schoenholz   MRN: [id number]   : 1950      Dear Dr. Giraldo:      This fine lady is 68 years old and reports that she has had paresthesias in the right hand as far back as 20 years.  She apparently did have an EMG while ago, told she did not have any definite carpal tunnel.  She says her tingling in the right hand is in the first 3 fingers and it is pretty persistent and she has some difficulty manipulating her right hand.  She does have some tingling in the left hand in the same distribution.      She does not have any diabetes.  She does not have any tingling in her lower extremities either.  She has had some neck problems which she adjusts herself but there is no radicular pain.      She does not have any medical problems per se.  She thinks she may have an EMG 20 years ago and it did show early carpal tunnel.      PAST MEDICAL HISTORY:  She carries a diagnosis of hyperlipidemia, nicotine addiction, chronic insomnia.      REVIEW OF SYSTEMS:  Indicative of nothing other.      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      PHYSICAL EXAMINATION:     GENERAL:   She is a pleasant, mildly anxious woman.     VITAL SIGNS:   Her blood pressure is 145/79.  Pulse 79.     NEUROLOGIC:   Her mental status exam is normal.  Cranial nerves are normal.      Her coordination is good.  Cerebellar testing.  Gait and station are unremarkable.  Romberg is negative.  Cerebellar testing is normal.  Her sensory exam shows a median loss in the right hand and suggestion on the left, but very little.  No fullness in the wrist area.  She does have mild weakness of the abductor pollicis brevis and opponens pollicis on the right.  Reflexes are obtainable everywhere, there is no sensory loss on her legs and feet.  Her plantars are flexor.   Babinskis are negative.      In summary, this woman has clear right carpal tunnel syndrome.  She has had it for 20 years and she has some weakness.  I told her it was important she had it released at this point.  We will do a confirmatory EMG and on the left we have referred her to Orthopedic for carpal tunnel release.      Sincerely,       MD DEJUAN Brian MD             D: 2018   T: 2018   MT: DADA      Name:     SCHOENHOLZ, DEBORAH   MRN:      [id number]        Account:      [id number]   :      1950           Service Date: 2018      Document: [id number]

## 2018-08-21 NOTE — LETTER
2018       RE: Deborah Schoenholz  [address]  [facility] [address]     Dear Colleague,    Thank you for referring your patient, Deborah Schoenholz, to the [facility] NEUROLOGY at [facility]. Please see a copy of my visit note below.    Service Date: 2018      Анна Giraldo MD   [facility]    [address] [facility] [address]   [place] [address]      RE: Deborah Schoenholz   MRN: [id number]   : 1950      Dear Dr. Giraldo:      This fine lady is 68 years old and reports that she has had paresthesias in the right hand as far back as 20 years.  She apparently did have an EMG while ago, told she did not have any definite carpal tunnel.  She says her tingling in the right hand is in the first 3 fingers and it is pretty persistent and she has some difficulty manipulating her right hand.  She does have some tingling in the left hand in the same distribution.      She does not have any diabetes.  She does not have any tingling in her lower extremities either.  She has had some neck problems which she adjusts herself but there is no radicular pain.      She does not have any medical problems per se.  She thinks she may have an EMG 20 years ago and it did show early carpal tunnel.      PAST MEDICAL HISTORY:  She carries a diagnosis of hyperlipidemia, nicotine addiction, chronic insomnia.      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      PHYSICAL EXAMINATION:     GENERAL:   She is a pleasant, mildly anxious woman.     VITAL SIGNS:   Her blood pressure is 145/79.  Pulse 79.     NEUROLOGIC:   Her mental status exam is normal.  Cranial nerves are normal.      Her coordination is good.  Cerebellar testing.  Gait and station are unremarkable.  Romberg is negative.  Cerebellar testing is normal.  Her sensory exam shows a median loss in the right hand and suggestion on the left, but very little.  No  fullness in the wrist area.  She does have mild weakness of the abductor pollicis brevis and opponens pollicis on the right.  Reflexes are obtainable everywhere, there is no sensory loss on her legs and feet.  Her plantars are flexor.  Babinskis are negative.      In summary, this woman has clear right carpal tunnel syndrome.  She has had it for 20 years and she has some weakness.  I told her it was important she had it released at this point.  We will do a confirmatory EMG and on the left we have referred her to Orthopedic for carpal tunnel release.      D: 2018   T: 2018   MT: DADA      Name:     SCHOENHOLZ, DEBORAH   MRN:      [id number]        Account:      [id number]   :      1950           Service Date: 2018      Document: [id number]       Again, thank you for allowing me to participate in the care of your patient.      Sincerely,    Mack Antunez MD

## 2018-09-02 LAB — HEMOCCULT STL QL IA: ABNORMAL

## 2018-09-04 DIAGNOSIS — Z12.11 COLON CANCER SCREENING: ICD-10-CM

## 2018-09-06 ENCOUNTER — PRE VISIT (OUTPATIENT)
Dept: ORTHOPEDICS | Facility: CLINIC | Age: 68
End: 2018-09-06

## 2018-09-07 DIAGNOSIS — M25.531 RIGHT WRIST PAIN: Primary | ICD-10-CM

## 2018-09-07 NOTE — TELEPHONE ENCOUNTER
FUTURE VISIT INFORMATION      FUTURE VISIT INFORMATION:    Date: 9/10    Time: 9:30    [place] [facility] [place]ERRAL INFORMATION:    Referring provider:  Mack Antunez    Referring providers clinic:  [facility] neurology    Reason for visit/diagnosis  R wrist carpel tunnel    RECORDS REQUESTED FROM:       Clinic name Comments Records Status Imaging Status                                         RECORDS STATUS      All records and images in epic

## 2018-09-10 ENCOUNTER — RADIANT APPOINTMENT (OUTPATIENT)
Dept: GENERAL RADIOLOGY | Facility: CLINIC | Age: 68
End: 2018-09-10
Attending: ORTHOPAEDIC SURGERY
Payer: COMMERCIAL

## 2018-09-10 ENCOUNTER — OFFICE VISIT (OUTPATIENT)
Dept: ORTHOPEDICS | Facility: CLINIC | Age: 68
End: 2018-09-10
Payer: COMMERCIAL

## 2018-09-10 VITALS — BODY MASS INDEX: 27.07 KG/M2 | WEIGHT: 152.8 LBS | HEIGHT: 63 IN

## 2018-09-10 DIAGNOSIS — M25.531 RIGHT WRIST PAIN: ICD-10-CM

## 2018-09-10 DIAGNOSIS — G56.03 CARPAL TUNNEL SYNDROME, BILATERAL: Primary | ICD-10-CM

## 2018-09-10 NOTE — MR AVS SNAPSHOT
"              After Visit Summary   9/10/2018    Deborah Schoenholz    MRN: [id number]           Patient Information     Date Of Birth          1950        Visit Information        Provider Department      9/10/2018 9:30 AM Liban Guthrie MD [facility] Orthopaedic Clinic        Today's Diagnoses     Carpal tunnel syndrome, bilateral    -  1       Follow-ups after your visit        Your next 10 appointments already scheduled     Sep 20, 2018  8:30 AM CDT   (Arrive by 8:15 AM)   EMG with Adryan Tenorio MD   [facility] EMG ([facility])    [address] [place] [address] [phone]              Who to contact     Please call your clinic at [phone] to:    Ask questions about your health    Make or cancel appointments    Discuss your medicines    Learn about your test results    Speak to your doctor            Additional Information About Your Visit        MyChart Information     [email or web address] gives you secure access to your electronic health record. If you see a primary care provider, you can also send messages to your care team and make appointments. If you have questions, please call your primary care clinic.  If you do not have a primary care provider, please call [phone] and they will assist you.      [employer] is an electronic gateway that provides easy, online access to your medical records. With [employer], you can request a clinic appointment, read your test results, renew a prescription or communicate with your care team.     To access your existing account, please contact your [facility] or call [phone] for assistance.        Care EveryWhere ID     This is your Care EveryWhere ID. This could be used by other organizations to access your [place] medical records  [id number]        Your Vitals Were     Height BMI (Body Mass Index)                1.588 m (5' 2.5\") 27.5 kg/m2           Blood Pressure from Last 3 " Encounters:   08/21/18 145/79   09/25/17 112/72   09/05/17 154/79    Weight from Last 3 Encounters:   09/10/18 69.3 kg (152 lb 12.8 oz)   08/21/18 68.9 kg (151 lb 12.8 oz)   09/05/17 74.8 kg (165 lb)              Today, you had the following     No orders found for display       Primary Care Provider Office Phone # Fax #    Анна Giraldo [phone]       [address] [facility] [address]  [facility] [address]        Equal Access to Services     [facility]: Hadii melissa anna hadisabela Sosamuel, waaxda luqadaha, qaybta kaalmavic steiner, ely wood . So [facility] [phone].    ATENCIÓN: Si habla español, tiene a garcia disposición servicios gratuitos de asistencia lingüística. [facility] [phone].    We comply with applicable federal civil rights laws and [place] laws. We do not discriminate on the basis of race, color, national origin, age, disability, sex, sexual orientation, or gender identity.            Thank you!     Thank you for choosing [facility] ORTHOPAEDIC CLINIC  for your care. Our goal is always to provide you with excellent care. Hearing back from our patients is one way we can continue to improve our services. Please take a few minutes to complete the written survey that you may receive in the mail after your visit with us. Thank you!             Your Updated Medication List - Protect others around you: Learn how to safely use, store and throw away your medicines at www.disposemymeds.org.          This list is accurate as of 9/10/18 11:59 PM.  Always use your most recent med list.                   Brand Name Dispense Instructions for use Diagnosis    ascorbic acid 500 MG tablet    VITAMIN C     Take 1 tablet by mouth daily.        CALCIUM 600 + MINERALS PO      1 tablet daily        clobetasol 0.05 % ointment    TEMOVATE    60 g    Apply as needed to affected areas of hands 2x daily    Dyshidrotic hand dermatitis       VITAMIN B COMPLEX PO           zolpidem 5 MG tablet     AMBIEN    30 tablet    Take 1 tablet (5 mg) by mouth nightly as needed for sleep    Persistent insomnia

## 2018-09-10 NOTE — LETTER
9/10/2018       RE: Deborah Schoenholz  [address]  [facility] [address]     Dear Colleague,    Thank you for referring your patient, Deborah Schoenholz, to the [facility] at [facility]. Please see a copy of my visit note below.    [facility]  Orthopedics  Liban Guthrie MD  09/10/2018     Name: Deborah Schoenholz  MRN: [id number]  Age: 68 year old  : 1950  Referring provider: Mack Antunez     Chief Complaint: Bilateral carpal tunnel syndrome     History of Present Illness:   Deborah Schoenholz is a 68 year old female who reports for evaluation of bilateral hand numbness and tingling. She was diagnosed with carpal tunnel syndrome 30 years ago. Her symptoms have worsened in the past 3 years, right greater than left, with increased numbness in her thumb, index, middle, and radial half of ring finger. Her previous symptoms consisted of intermittent numbness and occasional sharp pain. Her finger numbness is now much more prevalent with her index finger being numb continuously.  She wears braces to alleviate night time symptoms, which otherwise wake her up. Has similar symptoms on the left, but not constant. She also notes having a skin rash that has been present for 40 years. This is located near her index and middle finger.     Review of Systems:   A 10-point review of systems was obtained and is negative except for as noted in the HPI.     Medications:     Current Outpatient Prescriptions:      ascorbic acid (VITAMIN C) 500 MG tablet, Take 1 tablet by mouth daily., Disp: , Rfl:      B Complex Vitamins (VITAMIN B COMPLEX PO), , Disp: , Rfl:      Calcium Carbonate-Vit D-Min (CALCIUM 600 + MINERALS PO), 1 tablet daily, Disp: , Rfl:      clobetasol (TEMOVATE) 0.05 % ointment, Apply as needed to affected areas of hands 2x daily, Disp: 60 g, Rfl: 1     zolpidem (AMBIEN) 5 MG tablet, Take 1 tablet (5 mg) by mouth nightly as needed for sleep, Disp: 30  "tablet, Rfl: 1    Allergies:  The patient reports no known allergies.    Past Medical History:  Basal cell carcinoma  Carpal tunnel syndrome  Hyperlipidemia LDL  Insomnia  Nicotine addiction  Problems with sexual function    Past Surgical History:  Biopsy  MOHS micrographic procedure    Social History:   and artist, doing archeologic illustrations    Social History   Substance Use Topics     Smoking status: Light Tobacco Smoker     Packs/day: 0.30     Years: 35.00     Types: Cigarettes     Smokeless tobacco: Never Used      Comment: 2 packs per week     Alcohol use 0.0 oz/week      Comment: one or two drinks three or four nights a week       Family History:  Negative for bleeding or clotting disorders or adverse reactions to anesthesia.    Physical Examination:  Height 1.588 m (5' 2.5\"), weight 69.3 kg (152 lb 12.8 oz).   Well-developed, well-nourished and in no acute distress.  Alert and oriented to surroundings.  On examination of the right upper extremity:   Skin clean, dry and intact other then slight flaking of skin over the distal digits. There is enlargement and flexion contracture of DIP joints of index, middle and ring fingers  Negative phalens  Positive tinel's at carpal tunnel and cubital tunnel  Negative carpal tunnel compression test   Negative Phalens  5/5 EPL, FDP, interosseous strength, thumb opposition intact   SILT r/u distributions, altered in median n distribution    On examination of the left upper extremity:   Skin clean, dry and intact. There is enlargement and flexion contracture of DIP joints of index and small fingers.  Positive phalens  Positive tinel's at carpal tunnel and cubital tunnel  Positive carpal tunnel compression test   Positive Phalens  5/5 EPL, FDP, interosseous strength, thumb opposition intact   SILT r/u/m distributions      Imaging:   Radiographs of the right wrist - 3 views (09/07/2018)  1. No acute osseous abnormality.  2. Marked degenerative changes at the " first and second carpometacarpal  joints and mild degenerative changes of the first digit and  radiocarpal joints.    Per radiology.    I have independently reviewed the above imaging studies; the results were discussed with the patient.     Assessment:   68 year old female with right greater than left suspected carpal tunnel syndrome.      Plan:   Deborah Schoenholz and I had a lengthy discussion about the suspected diagnosis and possible treatment options. We discussed three possible treatment options. The first would be to continue night splinting and to observe the symptoms for any change or progression. The second would be to perform a corticosteroid injection. The third is to consider surgery, which would be an open carpal tunnel release. She would like to go forward with a right carpal tunnel release. I have asked her to get an EMG first. This has already been ordered. I will call her to discuss the results of the EMG and we will go from there.          Scribe Disclosure:   I, Rafael Dario, am serving as a scribe to document services personally performed by Liban Guthrie MD at this visit, based upon the provider's statements to me. All documentation has been reviewed by the aforementioned provider prior to being entered into the official medical record.     Portions of this medical record were completed by a scribe. UPON MY REVIEW AND AUTHENTICATION BY ELECTRONIC SIGNATURE, this confirms (a) I performed the applicable clinical services, and (b) the record is accurate.    Again, thank you for allowing me to participate in the care of your patient.      Sincerely,    Liban Guthrie MD

## 2018-09-10 NOTE — PROGRESS NOTES
[facility]  Orthopedics  Liban Guthrie MD  09/10/2018     Name: Deborah Schoenholz  MRN: [id number]  Age: 68 year old  : 1950  Referring provider: Mack Antunez     Chief Complaint: Bilateral carpal tunnel syndrome     History of Present Illness:   Deborah Schoenholz is a 68 year old female who reports for evaluation of bilateral hand numbness and tingling. She was diagnosed with carpal tunnel syndrome 30 years ago. Her symptoms have worsened in the past 3 years, right greater than left, with increased numbness in her thumb, index, middle, and radial half of ring finger. Her previous symptoms consisted of intermittent numbness and occasional sharp pain. Her finger numbness is now much more prevalent with her index finger being numb continuously.  She wears braces to alleviate night time symptoms, which otherwise wake her up. Has similar symptoms on the left, but not constant. She also notes having a skin rash that has been present for 40 years. This is located near her index and middle finger.     Review of Systems:   A 10-point review of systems was obtained and is negative except for as noted in the HPI.     Medications:     Current Outpatient Prescriptions:      ascorbic acid (VITAMIN C) 500 MG tablet, Take 1 tablet by mouth daily., Disp: , Rfl:      B Complex Vitamins (VITAMIN B COMPLEX PO), , Disp: , Rfl:      Calcium Carbonate-Vit D-Min (CALCIUM 600 + MINERALS PO), 1 tablet daily, Disp: , Rfl:      clobetasol (TEMOVATE) 0.05 % ointment, Apply as needed to affected areas of hands 2x daily, Disp: 60 g, Rfl: 1     zolpidem (AMBIEN) 5 MG tablet, Take 1 tablet (5 mg) by mouth nightly as needed for sleep, Disp: 30 tablet, Rfl: 1    Allergies:  The patient reports no known allergies.    Past Medical History:  Basal cell carcinoma  Carpal tunnel syndrome  Hyperlipidemia LDL  Insomnia  Nicotine addiction  Problems with sexual function    Past Surgical History:  Biopsy  MOHS micrographic procedure    Social  "History:   and artist, doing archeologic illustrations    Social History   Substance Use Topics     Smoking status: Light Tobacco Smoker     Packs/day: 0.30     Years: 35.00     Types: Cigarettes     Smokeless tobacco: Never Used      Comment: 2 packs per week     Alcohol use 0.0 oz/week      Comment: one or two drinks three or four nights a week       Family History:  Negative for bleeding or clotting disorders or adverse reactions to anesthesia.    Physical Examination:  Height 1.588 m (5' 2.5\"), weight 69.3 kg (152 lb 12.8 oz).   Well-developed, well-nourished and in no acute distress.  Alert and oriented to surroundings.  On examination of the right upper extremity:   Skin clean, dry and intact other then slight flaking of skin over the distal digits. There is enlargement and flexion contracture of DIP joints of index, middle and ring fingers  Negative phalens  Positive tinel's at carpal tunnel and cubital tunnel  Negative carpal tunnel compression test   Negative Phalens  5/5 EPL, FDP, interosseous strength, thumb opposition intact   SILT r/u distributions, altered in median n distribution    On examination of the left upper extremity:   Skin clean, dry and intact. There is enlargement and flexion contracture of DIP joints of index and small fingers.  Positive phalens  Positive tinel's at carpal tunnel and cubital tunnel  Positive carpal tunnel compression test   Positive Phalens  5/5 EPL, FDP, interosseous strength, thumb opposition intact   SILT r/u/m distributions      Imaging:   Radiographs of the right wrist - 3 views (09/07/2018)  1. No acute osseous abnormality.  2. Marked degenerative changes at the first and second carpometacarpal  joints and mild degenerative changes of the first digit and  radiocarpal joints.    Per radiology.    I have independently reviewed the above imaging studies; the results were discussed with the patient.     Assessment:   68 year old female with right greater " than left suspected carpal tunnel syndrome.      Plan:   Deborah Schoenholz and I had a lengthy discussion about the suspected diagnosis and possible treatment options. We discussed three possible treatment options. The first would be to continue night splinting and to observe the symptoms for any change or progression. The second would be to perform a corticosteroid injection. The third is to consider surgery, which would be an open carpal tunnel release. She would like to go forward with a right carpal tunnel release. I have asked her to get an EMG first. This has already been ordered. I will call her to discuss the results of the EMG and we will go from there.          Scribe Disclosure:   I, Rafael Bishop, am serving as a scribe to document services personally performed by Liban Guthrie MD at this visit, based upon the provider's statements to me. All documentation has been reviewed by the aforementioned provider prior to being entered into the official medical record.     Portions of this medical record were completed by a scribe. UPON MY REVIEW AND AUTHENTICATION BY ELECTRONIC SIGNATURE, this confirms (a) I performed the applicable clinical services, and (b) the record is accurate.    Answers for HPI/ROS submitted by the patient on 9/6/2018   General Symptoms: No  Skin Symptoms: No  HENT Symptoms: No  EYE SYMPTOMS: No  HEART SYMPTOMS: No  LUNG SYMPTOMS: No  INTESTINAL SYMPTOMS: No  URINARY SYMPTOMS: No  GYNECOLOGIC SYMPTOMS: No  BREAST SYMPTOMS: No  SKELETAL SYMPTOMS: No  BLOOD SYMPTOMS: No  NERVOUS SYSTEM SYMPTOMS: No  MENTAL HEALTH SYMPTOMS: No      Liban Guthrie MD

## 2018-09-10 NOTE — NURSING NOTE
"Reason For Visit:   Chief Complaint   Patient presents with     Consult     right wrist capel tunnel, started hurring many years ago (about 23) about 3 years ago started to having numbness and has progressivly got worse       Primary MD: Анна Giraldo  Ref. MD: Dr Antunez    ?  No    Age: 68 year old    Occupation chemistry [employer]  Currently working? Yes.  Work status?  Full time.  Date of injury: about 24 years ago bu numbness started 3 years ago  Type of injury: Chronic  Smoker: Yes  Request smoking cessation information: No      Ht 1.588 m (5' 2.5\")  Wt 69.3 kg (152 lb 12.8 oz)  BMI 27.5 kg/m2      Pain Assessment  Patient Currently in Pain: Denies (numbness)       Pinch force  R hand key force: 6 kg (13 lb 3.6 oz)  L hand key force: 6 kg (13 lb 3.6 oz)   force  R hand  level 2 force: 19 kg (41 lb 14.2 oz)  L hand  level  2 force: 19 kg (41 lb 14.2 oz)    QuickDASH Assessment  No flowsheet data found.       Allergies   Allergen Reactions     No Known Allergies        Lele Fox ATC    "

## 2018-09-20 ENCOUNTER — OFFICE VISIT (OUTPATIENT)
Dept: NEUROLOGY | Facility: CLINIC | Age: 68
End: 2018-09-20
Payer: COMMERCIAL

## 2018-09-20 DIAGNOSIS — G56.01 CARPAL TUNNEL SYNDROME OF RIGHT WRIST: ICD-10-CM

## 2018-09-20 NOTE — MR AVS SNAPSHOT
After Visit Summary   9/20/2018    Deborah Schoenholz    MRN: [id number]           Patient Information     Date Of Birth          1950        Visit Information        Provider Department      9/20/2018 8:30 AM Adryan Tenorio MD [facility] EMG        Today's Diagnoses     Carpal tunnel syndrome of right wrist           Follow-ups after your visit        Future tests that were ordered for you today     Open Future Orders        Priority Expected Expires Ordered    NCS Motor with or without F-Wave, 5-6 nerves Routine  9/20/2019 9/20/2018    Needle EMG Each Extremity w/Paraspinal Area Complete (73385) Routine  9/20/2019 9/20/2018            Who to contact     Please call your clinic at [phone] to:    Ask questions about your health    Make or cancel appointments    Discuss your medicines    Learn about your test results    Speak to your doctor            Additional Information About Your Visit        MyChart Information     PenBlade gives you secure access to your electronic health record. If you see a primary care provider, you can also send messages to your care team and make appointments. If you have questions, please call your primary care clinic.  If you do not have a primary care provider, please call [phone] and they will assist you.      PenBlade is an electronic gateway that provides easy, online access to your medical records. With PenBl[employer], you can request a clinic appointment, read your test results, renew a prescription or communicate with your care team.     To access your existing account, please contact your [facility] or call [phone] for assistance.        Care EveryWhere ID     This is your Care EveryWhere ID. This could be used by other organizations to access your [place] medical records  [id number]         Blood Pressure from Last 3 Encounters:   08/21/18 145/79   09/25/17 112/72   09/05/17 154/79    Weight from Last 3 Encounters:    09/10/18 69.3 kg (152 lb 12.8 oz)   08/21/18 68.9 kg (151 lb 12.8 oz)   09/05/17 74.8 kg (165 lb)              We Performed the Following     EMG        Primary Care Provider Office Phone # Fax #    Анна Giraldo [phone]       [address] [facility] [address]  [facility] [address]        Equal Access to Services     [facility] : Hadii aad ku hadasho Soomaali, waaxda luqadaha, qaybta kaalmada adeegyada, waxay idiin hayaan adeeg ojse laCarriedana . So [facility] [phone].    ATENCIÓN: Si habla espdaryl, tiene a garcia disposición servicios gratuitos de asistencia lingüística. Llame al [phone].    We comply with applicable federal civil rights laws and [place] laws. We do not discriminate on the basis of race, color, national origin, age, disability, sex, sexual orientation, or gender identity.            Thank you!     Thank you for choosing [facility]  for your care. Our goal is always to provide you with excellent care. Hearing back from our patients is one way we can continue to improve our services. Please take a few minutes to complete the written survey that you may receive in the mail after your visit with us. Thank you!             Your Updated Medication List - Protect others around you: Learn how to safely use, store and throw away your medicines at www.disposemymeds.org.          This list is accurate as of 9/20/18  9:17 AM.  Always use your most recent med list.                   Brand Name Dispense Instructions for use Diagnosis    ascorbic acid 500 MG tablet    VITAMIN C     Take 1 tablet by mouth daily.        CALCIUM 600 + MINERALS PO      1 tablet daily        clobetasol 0.05 % ointment    TEMOVATE    60 g    Apply as needed to affected areas of hands 2x daily    Dyshidrotic hand dermatitis       VITAMIN B COMPLEX PO           zolpidem 5 MG tablet    AMBIEN    30 tablet    Take 1 tablet (5 mg) by mouth nightly as needed for sleep    Persistent insomnia

## 2018-09-20 NOTE — PROGRESS NOTES
[facility]  Electrodiagnostic Laboratory    Nerve Conduction & EMG Report          Patient:       Deborah Schoenholz  Patient ID:    [id number]  Gender:        Female  YOB: 1950  Age:           68 Years 4 Months      History & Examination: This is a 68-year-old female with longtime carpal tunnel syndrome.  Dr. Liban Guthrie has referred her for reevaluation of her carpal tunnel syndrome which is worse on the right.    Techniques:  Motor conduction studies were done with surface recording electrodes. Sensory conduction studies were done with subdermal needle recording electrodes. EMG was done with a concentric needle electrode.     Results: The right median antidromic sensory nerve action potential is absent.  The left median antidromic sensory nerve action potential is reduced in amplitude and its distal conduction velocity is slowed.  Right ulnar sensory conduction is normal.  The right median compound motor action potential is reduced in amplitude and its distal latency prolonged.  The left median compound motor action potential also has a prolonged distal latency.  In addition the configuration of the waveforms in the left median nerve suggest a qbqbdv-yd-gvccq anastomosis (Michele-Lui anomaly).  Comparison of distal median and ulnar motor distal latencies demonstrate disproportionate slowing of median motor conduction bilaterally.  Needle exam of the right arm screening for radiculopathy was normal.    Interpretation: There is EMG evidence for electro-diagnostically severe right and moderate left median neuropathies at the wrist (carpal tunnel syndrome).    EMG Physician: Adryan Tenorio MD    Sensory NCS      Nerve / Sites Rec. Site Onset Peak NP Amp Ref. PP Amp Dist Barrington Ref. Temp     ms ms  V  V  V cm m/s m/s  C   R MEDIAN - Dig II Anti      Wrist Dig II NR NR NR 10.0 NR 14 NR 48.0 33.9   L MEDIAN - Dig II Anti      Wrist Dig II 3.23 4.38 8.5 10.0 14.3 14 43.4 48.0 34.9   R  ULNAR - Dig V Anti      Wrist Dig V 1.93 2.45 11.7 8.0 21.1 12.5 64.9 48.0 33.8       Motor NCS      Nerve / Sites Rec. Site Lat Ref. Amp Ref. Rel Amp Dist Barrington Ref. Dur. Area Temp.     ms ms mV mV % cm m/s m/s ms %  C   R MEDIAN - APB      Wrist APB 7.55 4.40 1.0 5.0 100 8   5.16 100 33.8      Elbow APB 11.30  0.4  39.4 21 56.0 48.0 4.27 30.9 33.5      (MG CHK) Elbow ADM 7.86  0.3  26.9    3.49 25.7 32.1   L MEDIAN - APB      Wrist APB 4.58 4.40 5.2 5.0 100 8   5.94 100 34.7      Elbow APB 8.49  7.2  137 22 56.3 48.0 6.04 111 34.5      (MG CHK) Elbow ADM 6.15  0.8  14.4    4.43 15.5 34.7   R ULNAR - ADM      Wrist ADM 2.40 3.50 11.0 5.0 100 8   4.43 100 32.1      B.Elbow ADM 5.42  10.3  93.9 19 62.9 48.0 4.22 95.6 32.1      A.Elbow ADM 6.82  10.1  91.6 9 64.0 48.0 4.48 86.9 32   R MEDIAN - II Lumb      Median II Lumb 5.52  0.2  100 10   6.41 100 33.8      Ulnar Palm Int 2.24  2.6  1103 10   5.10 527 33.8   L MEDIAN - II Lumb      Median II Lumb 4.17  0.3  100 10   5.83 100 34      Ulnar Palm Int 2.55  1.6  577 10   5.10 377 34.3       Needle EMG (W)      EMG Summary Table     Spontaneous MUAP Recruitment    IA Fib/PSW Fasc H.F. Amp Dur. PPP Pattern   R. FIRST D INTEROSS N None None None N N N N   R. EXT INDICIS N None None None N N N N   R. FLEX CARPI RAD N None None None N N N N   R. BICEPS N None None None N N N N   R. TRICEPS N None None None N N N N

## 2018-09-20 NOTE — LETTER
9/20/2018       RE: Deborah Schoenholz  [address]  [facility] [address]     Dear Colleague,    Thank you for referring your patient, Deborah Schoenholz, to the [facility] EMG at [facility]. Please see a copy of my visit note below.    [facility]  Electrodiagnostic Laboratory    Nerve Conduction & EMG Report          Patient:       Deborah Schoenholz  Patient ID:    [id number]  Gender:        Female  YOB: 1950  Age:           68 Years 4 Months      History & Examination: This is a 68-year-old female with longtime carpal tunnel syndrome.  Dr. Liban Guthrie has referred her for reevaluation of her carpal tunnel syndrome which is worse on the right.    Techniques:  Motor conduction studies were done with surface recording electrodes. Sensory conduction studies were done with subdermal needle recording electrodes. EMG was done with a concentric needle electrode.     Results: The right median antidromic sensory nerve action potential is absent.  The left median antidromic sensory nerve action potential is reduced in amplitude and its distal conduction velocity is slowed.  Right ulnar sensory conduction is normal.  The right median compound motor action potential is reduced in amplitude and its distal latency prolonged.  The left median compound motor action potential also has a prolonged distal latency.  In addition the configuration of the waveforms in the left median nerve suggest a oeeujv-cd-zhefw anastomosis (Michele-Lui anomaly).  Comparison of distal median and ulnar motor distal latencies demonstrate disproportionate slowing of median motor conduction bilaterally.  Needle exam of the right arm screening for radiculopathy was normal.    Interpretation: There is EMG evidence for electro-diagnostically severe right and moderate left median neuropathies at the wrist (carpal tunnel syndrome).    EMG Physician: Adryan Tenorio MD    Sensory  NCS      Nerve / Sites Rec. Site Onset Peak NP Amp Ref. PP Amp Dist Barrington Ref. Temp     ms ms  V  V  V cm m/s m/s  C   R MEDIAN - Dig II Anti      Wrist Dig II NR NR NR 10.0 NR 14 NR 48.0 33.9   L MEDIAN - Dig II Anti      Wrist Dig II 3.23 4.38 8.5 10.0 14.3 14 43.4 48.0 34.9   R ULNAR - Dig V Anti      Wrist Dig V 1.93 2.45 11.7 8.0 21.1 12.5 64.9 48.0 33.8       Motor NCS      Nerve / Sites Rec. Site Lat Ref. Amp Ref. Rel Amp Dist Barrington Ref. Dur. Area Temp.     ms ms mV mV % cm m/s m/s ms %  C   R MEDIAN - APB      Wrist APB 7.55 4.40 1.0 5.0 100 8   5.16 100 33.8      Elbow APB 11.30  0.4  39.4 21 56.0 48.0 4.27 30.9 33.5      (MG CHK) Elbow ADM 7.86  0.3  26.9    3.49 25.7 32.1   L MEDIAN - APB      Wrist APB 4.58 4.40 5.2 5.0 100 8   5.94 100 34.7      Elbow APB 8.49  7.2  137 22 56.3 48.0 6.04 111 34.5      (MG CHK) Elbow ADM 6.15  0.8  14.4    4.43 15.5 34.7   R ULNAR - ADM      Wrist ADM 2.40 3.50 11.0 5.0 100 8   4.43 100 32.1      B.Elbow ADM 5.42  10.3  93.9 19 62.9 48.0 4.22 95.6 32.1      A.Elbow ADM 6.82  10.1  91.6 9 64.0 48.0 4.48 86.9 32   R MEDIAN - II Lumb      Median II Lumb 5.52  0.2  100 10   6.41 100 33.8      Ulnar Palm Int 2.24  2.6  1103 10   5.10 527 33.8   L MEDIAN - II Lumb      Median II Lumb 4.17  0.3  100 10   5.83 100 34      Ulnar Palm Int 2.55  1.6  577 10   5.10 377 34.3       Needle EMG (W)      EMG Summary Table     Spontaneous MUAP Recruitment    IA Fib/PSW Fasc H.F. Amp Dur. PPP Pattern   R. FIRST D INTEROSS N None None None N N N N   R. EXT INDICIS N None None None N N N N   R. FLEX CARPI RAD N None None None N N N N   R. BICEPS N None None None N N N N   R. TRICEPS N None None None N N N N

## 2018-10-05 ENCOUNTER — MYC MEDICAL ADVICE (OUTPATIENT)
Dept: ORTHOPEDICS | Facility: CLINIC | Age: 68
End: 2018-10-05

## 2018-10-05 DIAGNOSIS — G56.02 CARPAL TUNNEL SYNDROME OF LEFT WRIST: ICD-10-CM

## 2018-10-05 DIAGNOSIS — G56.01 CARPAL TUNNEL SYNDROME OF RIGHT WRIST: Primary | ICD-10-CM

## 2018-10-09 ENCOUNTER — TELEPHONE (OUTPATIENT)
Dept: ORTHOPEDICS | Facility: CLINIC | Age: 68
End: 2018-10-09

## 2018-10-09 NOTE — TELEPHONE ENCOUNTER
Called patient and left a message to schedule her carpal tunnel procedures with Dr Guthrie, gave patient my number [phone] to call back or responded back to her Divided message.

## 2018-10-10 NOTE — TELEPHONE ENCOUNTER
Patient is scheduled for surgery with Dr. Guthrie    Spoke or left message with: patient via LetGive    Date of Surgery:11/15/18    Location: [facility]    H&P: Local only    Surgery packet: nurse to call patient to go over everything.    Additional comments: patient will wait for pre op phone call 1-2 days prior to surgery for arrival time, patient is going to wait to schedule her Left hand after the new year and see how the RIght one goes, she will contact us once she is ready.

## 2018-10-15 ENCOUNTER — TELEPHONE (OUTPATIENT)
Dept: ORTHOPEDICS | Facility: CLINIC | Age: 68
End: 2018-10-15

## 2018-10-15 NOTE — TELEPHONE ENCOUNTER
Called patient and gave pre-op instructions over the phone.  A pre-op packet will be mailed to patient.  She has our contact information for any further questions or concerns.

## 2018-10-29 ASSESSMENT — ANXIETY QUESTIONNAIRES
GAD7 TOTAL SCORE: 0
1. FEELING NERVOUS, ANXIOUS, OR ON EDGE: NOT AT ALL
6. BECOMING EASILY ANNOYED OR IRRITABLE: NOT AT ALL
3. WORRYING TOO MUCH ABOUT DIFFERENT THINGS: NOT AT ALL
4. TROUBLE RELAXING: NOT AT ALL
GAD7 TOTAL SCORE: 0
7. FEELING AFRAID AS IF SOMETHING AWFUL MIGHT HAPPEN: NOT AT ALL
2. NOT BEING ABLE TO STOP OR CONTROL WORRYING: NOT AT ALL
7. FEELING AFRAID AS IF SOMETHING AWFUL MIGHT HAPPEN: NOT AT ALL
5. BEING SO RESTLESS THAT IT IS HARD TO SIT STILL: NOT AT ALL

## 2018-10-29 ASSESSMENT — ENCOUNTER SYMPTOMS
NUMBNESS: 1
MYALGIAS: 1
STIFFNESS: 1
MUSCLE CRAMPS: 1

## 2018-10-30 ASSESSMENT — ANXIETY QUESTIONNAIRES: GAD7 TOTAL SCORE: 0

## 2018-11-01 ENCOUNTER — OFFICE VISIT (OUTPATIENT)
Dept: FAMILY MEDICINE | Facility: CLINIC | Age: 68
End: 2018-11-01
Attending: FAMILY MEDICINE
Payer: COMMERCIAL

## 2018-11-01 VITALS
BODY MASS INDEX: 27.3 KG/M2 | WEIGHT: 154.1 LBS | DIASTOLIC BLOOD PRESSURE: 86 MMHG | SYSTOLIC BLOOD PRESSURE: 151 MMHG | HEIGHT: 63 IN

## 2018-11-01 DIAGNOSIS — Z12.11 COLON CANCER SCREENING: ICD-10-CM

## 2018-11-01 DIAGNOSIS — Z00.00 ANNUAL PHYSICAL EXAM: Primary | ICD-10-CM

## 2018-11-01 DIAGNOSIS — F17.218 CIGARETTE NICOTINE DEPENDENCE WITH OTHER NICOTINE-INDUCED DISORDER: ICD-10-CM

## 2018-11-01 DIAGNOSIS — Z13.220 SCREENING FOR CHOLESTEROL LEVEL: ICD-10-CM

## 2018-11-01 DIAGNOSIS — R03.0 ELEVATED BP WITHOUT DIAGNOSIS OF HYPERTENSION: ICD-10-CM

## 2018-11-01 PROCEDURE — G0463 HOSPITAL OUTPT CLINIC VISIT: HCPCS | Mod: ZF

## 2018-11-01 ASSESSMENT — PAIN SCALES - GENERAL: PAINLEVEL: NO PAIN (0)

## 2018-11-01 NOTE — PROGRESS NOTES
SUBJECTIVE:                                                            Deborah Schoenholz is a 68 year old female who presents for Preventive Visit.  Are you in the first 12 months of your Medicare Part B coverage?  No  Healthy Habits:    Do you get at least three servings of calcium containing foods daily (dairy, green leafy vegetables, etc.)-taking calcium and/or vitamin D supplement: yes    Amount of exercise or daily activities, outside of work: gardening in the summer. Gym in the winter. Walker.     Problems taking medications regularly No    Medication side effects: No    Have you had an eye exam in the past two years? yes    Do you see a dentist twice per year? no    Do you have sleep apnea, excessive snoring or daytime drowsiness?no  Other concerns to address:    Noted a elevated BP today [did have a cigarette before coming in]  BP Readings from Last 3 Encounters:   08/21/18 145/79   09/25/17 112/72   09/05/17 154/79   Weight loss with calorie reduction  Wt Readings from Last 5 Encounters:   11/01/18 69.9 kg (154 lb 1.6 oz)   09/10/18 69.3 kg (152 lb 12.8 oz)   08/21/18 68.9 kg (151 lb 12.8 oz)   09/05/17 74.8 kg (165 lb)   05/16/17 75.8 kg (167 lb)   Nicotine addiction: smoking 7 cig/day. Might consider stopping if this would help her BP and avoid taking medication  Anticipating Right Open Carpal Tunnel Release by Dr. Guthrie on 11.15.2018  Social History   Substance Use Topics     Smoking status: Light Tobacco Smoker     Packs/day: 0.30     Years: 35.00     Types: Cigarettes     Smokeless tobacco: Never Used      Comment: 2 packs per week     Alcohol use 0.0 oz/week      Comment: one or two drinks three or four nights a week     The patient does not drink >3 drinks per day nor >7 drinks per week.  Today's PHQ-2 Score:   PHQ-2 ( 1999 [employer]) 10/29/2018 8/21/2018   Q1: Little interest or pleasure in doing things 0 0   Q2: Feeling down, depressed or hopeless 0 0   PHQ-2 Score 0 0   Q1: Little interest or  pleasure in doing things Not at all -   Q2: Feeling down, depressed or hopeless Not at all -   PHQ-2 Score 0 -   Do you feel safe in your environment - Yes  Do you have a Health Care Directive?: Yes: Patient states has Advance Directive and will bring in a copy to clinic.  Current providers sharing in care for this patient include:   Patient Care Team:  Анна Giraldo MD as PCP - General (Family Practice)  Jose Laura MD as MD (Dermatology)    Hearing impairment: No    Ability to successfully perform activities of daily living: Yes, no assistance needed     Fall risk:Home safety:  none identified  Health Maintenance   Topic Date Due     TETANUS IMMUNIZATION (SYSTEM ASSIGNED)  1967     HEPATITIS C SCREENING  1967     ADVANCE DIRECTIVE PLANNING Q5 YRS  2004     PNEUMOCOCCAL (1 of 2 - PCV13) 2014     INFLUENZA VACCINE (1) 2018     FALL RISK ASSESSMENT  2019     FIT Q1 YR  2019     MAMMO SCREEN Q2 YR (SYSTEM ASSIGNED)  2019     PHQ-2 Q1 YR  2019     LIPID SCREEN Q5 YR FEMALE (SYSTEM ASSIGNED)  2022     DEXA SCAN SCREENING (SYSTEM ASSIGNED)  Completed   ROS:  C: NEGATIVE for fever, chills, change in weight  E/M: NEGATIVE for ear, mouth and throat problems  R: NEGATIVE for significant cough or SOB  CV: NEGATIVE for chest pain, palpitations or peripheral edema  MS: more pain in joints. Considering something like HGH and symptoms improved.   PAST OB/GYN History:   1)  1.  Post menopausal.  Not on HRT.  Rarely has [place].   Past Medical History   Diagnosis Date     Carpal tunnel syndrome      Insomnia, unspecified      Problems with sexual function      Hyperlipidemia LDL goal < 130      Nicotine addiction      Basal cell carcinoma    Life Style Modifiers:   Tobacco: 2 pp/week. 6-7/day.  Alcohol: two cocktails 3-4 night a week.   Exercise: Garden all summer and then limited aerobic and less walking and swimming.                   Diet: Good.  "Weight loss with calorie reduction   Supplements: Vitamin D, Vitamin C and calcium  PAST SURGICAL HISTORY:  Procedure Laterality Date     Mohs micrographic procedure     FAMILY HISTORY:  Problem Relation Age of Onset     DIABETES Maternal Grandmother      Hypertension Maternal Grandmother      Arthritis Paternal Grandmother      CANCER Father      lung at age 70     Eye Disorder Paternal Grandmother    SOCIAL HISTORY:  . 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  is overweight. Went to [facility] with   Daughter summer 2017 [[place] and Judith]. Going to [place] with Grandchild [age 12] history trail.   Current Outpatient Prescriptions   Medication Sig Dispense Refill     zolpidem (AMBIEN) 10 MG tablet Take 0.5 tablets (5 mg) by mouth nightly as needed For sleep 30 tablet 1     B Complex Vitamins (VITAMIN B COMPLEX PO)        calcium carbonate 500 MG tablet Take 1 tablet by mouth 2 times daily.       ascorbic acid (VITAMIN C) 500 MG tablet Take 1 tablet by mouth daily.     No known allergies  VITALS:  /86  Ht 1.588 m (5' 2.5\")  Wt 69.9 kg (154 lb 1.6 oz)  BMI 27.74 kg/m2  PHYSICAL EXAM:  Alert and oriented X 3 with no into evidence of memory loss  Constitutional: Women in no acute distress.   Psychological: appropriate mood.  Eyes: anicteric, normal extra-ocular movements.   Cardiovascular: regular rate and rhythm, normal S1 and S2, no murmurs, rubs or gallops, peripheral pulses full and symmetric. Respiratory: few distant wheezes in the right base. Normal breath sounds.  Breast: Symmetrical without visible distortion or swelling. No masses noted. No nipple inversion, no breast dimpling or puckering. Axillary area without masses or lympadenapathy.   Gastrointestinal: positive bowel sounds, nontender, no hepatosplenomegaly, no masses. No guarding or rebound.  Genitourinary: N/A  Lymphatic: no lymphadenopathy.  Musculoskeletal: full range of motion    Skin: no concerning lesions, no " "jaundice.  Neurological: normal gait, no tremor.   OBJECTIVE:                                                            Ht 1.588 m (5' 2.5\")  BMI 27.5 kg/m2 Estimated body mass index is 27.5 kg/(m^2) as calculated from the following:    Height as of 9/10/18: 1.588 m (5' 2.5\").    Weight as of 9/10/18: 69.3 kg (152 lb 12.8 oz).   Wt Readings from Last 5 Encounters:   11/01/18 69.9 kg (154 lb 1.6 oz)   09/10/18 69.3 kg (152 lb 12.8 oz)   08/21/18 68.9 kg (151 lb 12.8 oz)   09/05/17 74.8 kg (165 lb)   05/16/17 75.8 kg (167 lb)     BP Readings from Last 6 Encounters:   08/21/18 145/79   09/25/17 112/72   09/05/17 154/79   05/16/17 135/71   12/19/14 144/79   11/14/14 144/86   PHYSICAL EXAM:  Alert and oriented X 3 with no into evidence of memory loss  Constitutional: Women in no acute distress.   Psychological: appropriate mood.  Eyes: anicteric, normal extra-ocular movements.   Cardiovascular: regular rate and rhythm, normal S1 and S2, no murmurs, rubs or gallops, peripheral pulses full and symmetric. Respiratory: few distant wheezes in the right base. Normal breath sounds.  Breast: Symmetrical without visible distortion or swelling. No masses noted. No nipple inversion, no breast dimpling or puckering. Axillary area without masses or lympadenapathy.   Gastrointestinal: positive bowel sounds, nontender, no hepatosplenomegaly, no masses. No guarding or rebound.  Genitourinary: N/A  Lymphatic: no lymphadenopathy.  Musculoskeletal: full range of motion    Skin: no concerning lesions, no jaundice.  Neurological: normal gait, no tremor.   ASSESSMENT / PLAN:                                                            Diagnoses and associated orders for this visit:  Annual physical exam   - Annual well exam with no need for pap smear.    -  Mammogram every two years [9/2017]  -  Immunization declined.   -  DEXA: normal bone in 2010 - will consider 2020  -  ADD magnesium to night time routine for sleep. PRN aleve  Elevated BP " "without diagnosis of hypertension    Advised Medication.  She declined.  She will stop smoking first.     Started smoking at age 29.     Recheck in the spring 2019 and if still elevated will start medication.   Nicotine addiction  -  Encouraged discontinuation of cigarettes - will stop over the winter in hopes of improving her BP  Colon cancer screening  -  Fecal colorectal cancer screen FIT given to patient   Transient insomnia  -  RX for Ambien given to patient use prn.   Screening for cholesterol level  -     Lipid Profile; Future  Screening for diabetes mellitus  -     Basic Metabolic Panel; Future  End of Life Planning:  Patient currently has an advanced directive: Yes.  Practitioner is supportive of decision.  COUNSELING:  Reviewed preventive health counseling, as reflected in patient instructions       Regular exercise  Estimated body mass index is 27.5 kg/(m^2) as calculated from the following:    Height as of 9/10/18: 1.588 m (5' 2.5\").    Weight as of 9/10/18: 69.3 kg (152 lb 12.8 oz).  Weight management plan: exercise plan   reports that she has been smoking Cigarettes.  She has a 10.50 pack-year smoking history. She has never used smokeless tobacco.    Appropriate preventive services were discussed with this patient, including applicable screening as appropriate for cardiovascular disease, diabetes, osteopenia/osteoporosis, and glaucoma.  As appropriate for age/gender, discussed screening for colorectal cancer, prostate cancer, breast cancer, and cervical cancer. Checklist reviewing preventive services available has been given to the patient.  Reviewed patients plan of care and provided an AVS. The Basic Care Plan (routine screening as documented in Health Maintenance) for [place] meets the Care Plan requirement. This Care Plan has been established and reviewed with the Patient.  Counseling Resources:  ATP IV Guidelines  Pooled Cohorts Equation Calculator  Breast Cancer Risk Calculator  FRAX Risk " Assessment  ICSI Preventive Guidelines  Dietary Guidelines for Americans, 2010  USDA's MyPlate  ASA Prophylaxis  Lung CA Screening  Анна Giraldo MD  [facility]

## 2018-11-01 NOTE — LETTER
11/1/2018       RE: Deborah Schoenholz  [address]  [facility] [address]     Dear Colleague,    Thank you for referring your patient, Deborah Schoenholz, to the [facility] at [facility]. Please see a copy of my visit note below.    SUBJECTIVE:                                                            Deborah Schoenholz is a 68 year old female who presents for Preventive Visit.  Are you in the first 12 months of your Medicare Part B coverage?  No  Healthy Habits:    Do you get at least three servings of calcium containing foods daily (dairy, green leafy vegetables, etc.)-taking calcium and/or vitamin D supplement: yes    Amount of exercise or daily activities, outside of work: gardening in the summer. Gym in the winter. Walker.     Problems taking medications regularly No    Medication side effects: No    Have you had an eye exam in the past two years? yes    Do you see a dentist twice per year? no    Do you have sleep apnea, excessive snoring or daytime drowsiness?no  Other concerns to address:    Noted a elevated BP today [did have a cigarette before coming in]  BP Readings from Last 3 Encounters:   08/21/18 145/79   09/25/17 112/72   09/05/17 154/79   Weight loss with calorie reduction  Wt Readings from Last 5 Encounters:   11/01/18 69.9 kg (154 lb 1.6 oz)   09/10/18 69.3 kg (152 lb 12.8 oz)   08/21/18 68.9 kg (151 lb 12.8 oz)   09/05/17 74.8 kg (165 lb)   05/16/17 75.8 kg (167 lb)   Nicotine addiction: smoking 7 cig/day. Might consider stopping if this would help her BP and avoid taking medication  Anticipating Right Open Carpal Tunnel Release by Dr. Guthrie on 11.15.2018  Social History   Substance Use Topics     Smoking status: Light Tobacco Smoker     Packs/day: 0.30     Years: 35.00     Types: Cigarettes     Smokeless tobacco: Never Used      Comment: 2 packs per week     Alcohol use 0.0 oz/week      Comment: one or two drinks three or  four nights a week     The patient does not drink >3 drinks per day nor >7 drinks per week.  Today's PHQ-2 Score:   PHQ-2 (  [employer]) 10/29/2018 2018   Q1: Little interest or pleasure in doing things 0 0   Q2: Feeling down, depressed or hopeless 0 0   PHQ-2 Score 0 0   Q1: Little interest or pleasure in doing things Not at all -   Q2: Feeling down, depressed or hopeless Not at all -   PHQ-2 Score 0 -   Do you feel safe in your environment - Yes  Do you have a Health Care Directive?: Yes: Patient states has Advance Directive and will bring in a copy to clinic.  Current providers sharing in care for this patient include:   Patient Care Team:  Анна Giraldo MD as PCP - General (Family Practice)  Jose Laura MD as MD (Dermatology)    Hearing impairment: No    Ability to successfully perform activities of daily living: Yes, no assistance needed     Fall risk:Home safety:  none identified  Health Maintenance   Topic Date Due     TETANUS IMMUNIZATION (SYSTEM ASSIGNED)  1967     HEPATITIS C SCREENING  1967     ADVANCE DIRECTIVE PLANNING Q5 YRS  2004     PNEUMOCOCCAL (1 of 2 - PCV13) 2014     INFLUENZA VACCINE (1) 2018     FALL RISK ASSESSMENT  2019     FIT Q1 YR  2019     MAMMO SCREEN Q2 YR (SYSTEM ASSIGNED)  2019     PHQ-2 Q1 YR  2019     LIPID SCREEN Q5 YR FEMALE (SYSTEM ASSIGNED)  2022     DEXA SCAN SCREENING (SYSTEM ASSIGNED)  Completed   ROS:  C: NEGATIVE for fever, chills, change in weight  E/M: NEGATIVE for ear, mouth and throat problems  R: NEGATIVE for significant cough or SOB  CV: NEGATIVE for chest pain, palpitations or peripheral edema  MS: more pain in joints. Considering something like HGH and symptoms improved.   PAST OB/GYN History:   1)  1.  Post menopausal.  Not on HRT.  Rarely has [place].   Past Medical History   Diagnosis Date     Carpal tunnel syndrome      Insomnia, unspecified      Problems with sexual  "function      Hyperlipidemia LDL goal < 130      Nicotine addiction      Basal cell carcinoma    Life Style Modifiers:   Tobacco: 2 pp/week. 6-7/day.  Alcohol: two cocktails 3-4 night a week.   Exercise: Garden all summer and then limited aerobic and less walking and swimming.                   Diet: Good. Weight loss with calorie reduction   Supplements: Vitamin D, Vitamin C and calcium  PAST SURGICAL HISTORY:  Procedure Laterality Date     Mohs micrographic procedure     FAMILY HISTORY:  Problem Relation Age of Onset     DIABETES Maternal Grandmother      Hypertension Maternal Grandmother      Arthritis Paternal Grandmother      CANCER Father      lung at age 70     Eye Disorder Paternal Grandmother    SOCIAL HISTORY:  . 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  is overweight. Went to [place] with   Daughter summer 2017 [[place] and Judith]. Going to [place] with Grandchild [age 12] history trail.   Current Outpatient Prescriptions   Medication Sig Dispense Refill     zolpidem (AMBIEN) 10 MG tablet Take 0.5 tablets (5 mg) by mouth nightly as needed For sleep 30 tablet 1     B Complex Vitamins (VITAMIN B COMPLEX PO)        calcium carbonate 500 MG tablet Take 1 tablet by mouth 2 times daily.       ascorbic acid (VITAMIN C) 500 MG tablet Take 1 tablet by mouth daily.     No known allergies  VITALS:  /86  Ht 1.588 m (5' 2.5\")  Wt 69.9 kg (154 lb 1.6 oz)  BMI 27.74 kg/m2  PHYSICAL EXAM:  Alert and oriented X 3 with no into evidence of memory loss  Constitutional: Women in no acute distress.   Psychological: appropriate mood.  Eyes: anicteric, normal extra-ocular movements.   Cardiovascular: regular rate and rhythm, normal S1 and S2, no murmurs, rubs or gallops, peripheral pulses full and symmetric. Respiratory: few distant wheezes in the right base. Normal breath sounds.  Breast: Symmetrical without visible distortion or swelling. No masses noted. No nipple inversion, no breast " "dimpling or puckering. Axillary area without masses or lympadenapathy.   Gastrointestinal: positive bowel sounds, nontender, no hepatosplenomegaly, no masses. No guarding or rebound.  Genitourinary: N/A  Lymphatic: no lymphadenopathy.  Musculoskeletal: full range of motion    Skin: no concerning lesions, no jaundice.  Neurological: normal gait, no tremor.   OBJECTIVE:                                                            Ht 1.588 m (5' 2.5\")  BMI 27.5 kg/m2 Estimated body mass index is 27.5 kg/(m^2) as calculated from the following:    Height as of 9/10/18: 1.588 m (5' 2.5\").    Weight as of 9/10/18: 69.3 kg (152 lb 12.8 oz).   Wt Readings from Last 5 Encounters:   11/01/18 69.9 kg (154 lb 1.6 oz)   09/10/18 69.3 kg (152 lb 12.8 oz)   08/21/18 68.9 kg (151 lb 12.8 oz)   09/05/17 74.8 kg (165 lb)   05/16/17 75.8 kg (167 lb)     BP Readings from Last 6 Encounters:   08/21/18 145/79   09/25/17 112/72   09/05/17 154/79   05/16/17 135/71   12/19/14 144/79   11/14/14 144/86   PHYSICAL EXAM:  Alert and oriented X 3 with no into evidence of memory loss  Constitutional: Women in no acute distress.   Psychological: appropriate mood.  Eyes: anicteric, normal extra-ocular movements.   Cardiovascular: regular rate and rhythm, normal S1 and S2, no murmurs, rubs or gallops, peripheral pulses full and symmetric. Respiratory: few distant wheezes in the right base. Normal breath sounds.  Breast: Symmetrical without visible distortion or swelling. No masses noted. No nipple inversion, no breast dimpling or puckering. Axillary area without masses or lympadenapathy.   Gastrointestinal: positive bowel sounds, nontender, no hepatosplenomegaly, no masses. No guarding or rebound.  Genitourinary: N/A  Lymphatic: no lymphadenopathy.  Musculoskeletal: full range of motion    Skin: no concerning lesions, no jaundice.  Neurological: normal gait, no tremor.   ASSESSMENT / PLAN:                                                          " "  Diagnoses and associated orders for this visit:  Annual physical exam   - Annual well exam with no need for pap smear.    -  Mammogram every two years [9/2017]  -  Immunization declined.   -  DEXA: normal bone in 2010 - will consider 2020  -  ADD magnesium to night time routine for sleep. PRN aleve  Elevated BP without diagnosis of hypertension    Advised Medication.  She declined.  She will stop smoking first.     Started smoking at age 29.     Recheck in the spring 2019 and if still elevated will start medication.   Nicotine addiction  -  Encouraged discontinuation of cigarettes - will stop over the winter in hopes of improving her BP  Colon cancer screening  -  Fecal colorectal cancer screen FIT given to patient   Transient insomnia  -  RX for Ambien given to patient use prn.   Screening for cholesterol level  -     Lipid Profile; Future  Screening for diabetes mellitus  -     Basic Metabolic Panel; Future  End of Life Planning:  Patient currently has an advanced directive: Yes.  Practitioner is supportive of decision.  COUNSELING:  Reviewed preventive health counseling, as reflected in patient instructions       Regular exercise  Estimated body mass index is 27.5 kg/(m^2) as calculated from the following:    Height as of 9/10/18: 1.588 m (5' 2.5\").    Weight as of 9/10/18: 69.3 kg (152 lb 12.8 oz).  Weight management plan: exercise plan   reports that she has been smoking Cigarettes.  She has a 10.50 pack-year smoking history. She has never used smokeless tobacco.    Appropriate preventive services were discussed with this patient, including applicable screening as appropriate for cardiovascular disease, diabetes, osteopenia/osteoporosis, and glaucoma.  As appropriate for age/gender, discussed screening for colorectal cancer, prostate cancer, breast cancer, and cervical cancer. Checklist reviewing preventive services available has been given to the patient.  Reviewed patients plan of care and provided an AVS. " The Basic Care Plan (routine screening as documented in Health Maintenance) for [place] meets the Care Plan requirement. This Care Plan has been established and reviewed with the Patient.  Counseling Resources:  ATP IV Guidelines  Pooled Cohorts Equation Calculator  Breast Cancer Risk Calculator  FRAX Risk Assessment  ICSI Preventive Guidelines  Dietary Guidelines for Americans, 2010  USDA's MyPlate  ASA Prophylaxis  Lung CA Screening  Анна Giraldo MD  [facility]

## 2018-11-01 NOTE — MR AVS SNAPSHOT
After Visit Summary   11/1/2018    Deborah Schoenholz    MRN: [id number]           Patient Information     Date Of Birth          1950        Visit Information        Provider Department      11/1/2018 9:20 AM Анна Giraldo MD [facility]        Today's Diagnoses     Annual physical exam    -  1    Colon cancer screening        Elevated BP without diagnosis of hypertension        Cigarette nicotine dependence with other nicotine-induced disorder        Screening for cholesterol level           Follow-ups after your visit        Your next 10 appointments already scheduled     Nov 15, 2018   Procedure with Liban Guthrie MD   [facility] ([facility] [place] [facility])    [address] [place] [address]  5th [facility] [address]   [phone]           Located in the Clinics and Surgery Center at [address] [place] [address].   parking is very convenient and highly recommended.  is a $6 flat rate fee.  Both  and self parkers should enter the main arrival plaza from [facility]; parking attendants will direct you based on your parking preference.            Nov 27, 2018  9:30 AM CST   (Arrive by 9:15 AM)   Post-Op with [facility] ([facility])    [address] [place] [address]  4th [facility] [address] [phone]            Dec 26, 2018 10:30 AM CST   (Arrive by 10:15 AM)   RETURN HAND with Liban Guthrie MD   [facility] ([facility] [place] [facility])    [address] [place] [id number] [phone]              Future tests that were ordered for you today     Open Future Orders        Priority Expected Expires Ordered    Lipid Panel Routine  11/1/2019 11/1/2018    Basic Metabolic Panel Routine  11/1/2019 11/1/2018    Fecal colorectal cancer screen FIT Routine 11/22/2018 11/1/2019  "11/1/2018            Who to contact     Please call your clinic at [phone] to:    Ask questions about your health    Make or cancel appointments    Discuss your medicines    Learn about your test results    Speak to your doctor            Additional Information About Your Visit        [employer] Information     [employer] gives you secure access to your electronic health record. If you see a primary care provider, you can also send messages to your care team and make appointments. If you have questions, please call your primary care clinic.  If you do not have a primary care provider, please call [phone] and they will assist you.      [employer] is an electronic gateway that provides easy, online access to your medical records. With [employer], you can request a clinic appointment, read your test results, renew a prescription or communicate with your care team.     To access your existing account, please contact your [facility] or call [phone] for assistance.        Care EveryWhere ID     This is your Care EveryWhere ID. This could be used by other organizations to access your [place] medical records  [id number]        Your Vitals Were     Height BMI (Body Mass Index)                1.588 m (5' 2.5\") 27.74 kg/m2           Blood Pressure from Last 3 Encounters:   11/01/18 151/86   08/21/18 145/79   09/25/17 112/72    Weight from Last 3 Encounters:   11/01/18 69.9 kg (154 lb 1.6 oz)   09/10/18 69.3 kg (152 lb 12.8 oz)   08/21/18 68.9 kg (151 lb 12.8 oz)               Primary Care Provider Office Phone # Fax #    Анна Giraldo [phone]       [address] [address]        Equal Access to Services     [facility]: Hadii melissa fischer Sosamuel, wajonatanda luqadaha, qaybta kaalmada ely steiner . So [facility] [phone].    ATENCIÓN: Si habla español, tiene a garcia disposición servicios gratuitos de asistencia " lingüísticaElias Barrientos al [phone].    We comply with applicable federal civil rights laws and [place] laws. We do not discriminate on the basis of race, color, national origin, age, disability, sex, sexual orientation, or gender identity.            Thank you!     Thank you for choosing [facility]  for your care. Our goal is always to provide you with excellent care. Hearing back from our patients is one way we can continue to improve our services. Please take a few minutes to complete the written survey that you may receive in the mail after your visit with us. Thank you!             Your Updated Medication List - Protect others around you: Learn how to safely use, store and throw away your medicines at www.disposemymeds.org.          This list is accurate as of 11/1/18 10:05 AM.  Always use your most recent med list.                   Brand Name Dispense Instructions for use Diagnosis    ascorbic acid 500 MG tablet    VITAMIN C     Take 1 tablet by mouth daily.        CALCIUM 600 + MINERALS PO      1 tablet daily        VITAMIN B COMPLEX PO           zolpidem 5 MG tablet    AMBIEN    30 tablet    Take 1 tablet (5 mg) by mouth nightly as needed for sleep    Persistent insomnia

## 2018-11-15 ENCOUNTER — HOSPITAL ENCOUNTER (OUTPATIENT)
Facility: AMBULATORY SURGERY CENTER | Age: 68
End: 2018-11-15
Attending: ORTHOPAEDIC SURGERY
Payer: COMMERCIAL

## 2018-11-15 ENCOUNTER — SURGERY (OUTPATIENT)
Age: 68
End: 2018-11-15

## 2018-11-15 VITALS
SYSTOLIC BLOOD PRESSURE: 125 MMHG | HEART RATE: 82 BPM | OXYGEN SATURATION: 95 % | DIASTOLIC BLOOD PRESSURE: 65 MMHG | TEMPERATURE: 98.5 F | RESPIRATION RATE: 14 BRPM

## 2018-11-15 DIAGNOSIS — G56.01 CARPAL TUNNEL SYNDROME OF RIGHT WRIST: Primary | ICD-10-CM

## 2018-11-15 RX ORDER — MAGNESIUM HYDROXIDE 1200 MG/15ML
LIQUID ORAL PRN
Status: DISCONTINUED | OUTPATIENT
Start: 2018-11-15 | End: 2018-11-15 | Stop reason: HOSPADM

## 2018-11-15 RX ORDER — LIDOCAINE HYDROCHLORIDE AND EPINEPHRINE 10; 10 MG/ML; UG/ML
20 INJECTION, SOLUTION INFILTRATION; PERINEURAL ONCE
Status: DISCONTINUED | OUTPATIENT
Start: 2018-11-15 | End: 2018-11-16 | Stop reason: HOSPADM

## 2018-11-15 RX ORDER — LIDOCAINE HYDROCHLORIDE AND EPINEPHRINE 10; 10 MG/ML; UG/ML
20 INJECTION, SOLUTION INFILTRATION; PERINEURAL ONCE
Status: COMPLETED | OUTPATIENT
Start: 2018-11-15 | End: 2018-11-15

## 2018-11-15 RX ORDER — HYDROCODONE BITARTRATE AND ACETAMINOPHEN 5; 325 MG/1; MG/1
1 TABLET ORAL EVERY 6 HOURS PRN
Qty: 10 TABLET | Refills: 0 | Status: SHIPPED | OUTPATIENT
Start: 2018-11-15 | End: 2020-11-04

## 2018-11-15 RX ADMIN — LIDOCAINE HYDROCHLORIDE AND EPINEPHRINE 16 ML: 10; 10 INJECTION, SOLUTION INFILTRATION; PERINEURAL at 06:52

## 2018-11-15 RX ADMIN — MAGNESIUM HYDROXIDE 50 ML: 1200 LIQUID ORAL at 07:45

## 2018-11-15 NOTE — IP AVS SNAPSHOT
[facility]    [address] [place] [address]    Phone:  [phone]    Fax:  [phone]                                       After Visit Summary   11/15/2018    Deborah Schoenholz    MRN: [id number]           After Visit Summary Signature Page     I have received my discharge instructions, and my questions have been answered. I have discussed any challenges I see with this plan with the nurse or doctor.    ..........................................................................................................................................  Patient/Patient Representative Signature      ..........................................................................................................................................  Patient Representative Print Name and Relationship to Patient    ..................................................               ................................................  Date                                   Time    ..........................................................................................................................................  Reviewed by Signature/Title    ...................................................              ..............................................  Date                                               Time          22EPIC Rev 08/18

## 2018-11-15 NOTE — OP NOTE
PREOPERATIVE DIAGNOSIS:  Right carpal tunnel syndrome.      POSTOPERATIVE DIAGNOSIS:  Right carpal tunnel syndrome.      PROCEDURE:  Right open carpal tunnel release.      ATTENDING SURGEON:  Liban Guthrie MD      ASSISTANT:  None.      ANESTHESIA:  Local anesthesia only consisting of 20 mL of 1% lidocaine with epinephrine 1:100,000      TOURNIQUET TIME:  No tourniquet used     ESTIMATED BLOOD LOSS:  1 mL.      SPECIMENS:  None.      DRAINS:  None.      IMPLANTS:  None.      COMPLICATIONS:  None apparent.      BRIEF HISTORY:  Deborah Schoenholz is a 68 year old-year-old female who presented with a history of numbness and tingling in a median nerve distribution.  She has undergone a trial of splinting but has had persistence of symptoms.  I had a discussion with the patient regarding open carpal tunnel release.  I described the procedure, post-operative protocol and expected outcomes.  We discussed the risks, benefits and alternatives to surgery.  Risks discussed include bleeding, infection, nerve or vessel damage, wound healing problems, persistent pain, persistent numbness, and the possibility for further surgery.  After a full discussion of risks, benefits, and alternatives to surgery, the patient elected to proceed and informed consent was obtained.    INTRAOPERATIVE FINDINGS:  The carpal tunnel was very tight proximally. There was severe hourglassing of the median nerve.   .      DESCRIPTION OF PROCEDURE:  The patient was identified in the preoperative holding area.  The informed consent was reviewed. The operative site was identified and marked. A total of 20 mL of 1% lidocaine with epinephrine in a 1:100,000 ratio was injected for anesthesia and to obviate the need for a tourniquet. 10 mL was injected at the proximal wrist crease subcutanously and then below the fascia and 10 mL was injected in line with the surgical incision.  The patient was brought to the operating room and positioned with the operative  extremity over a hand table. No preoperative prophylactic antibiotics were given. The operative arm was then prepped and draped in a standard sterile fashion.  A timeout was performed, verifying the correct patient, procedure to be performed, and operative site.  A longitudinal incision was made in line  with the radial aspect of the ring finger from the distal wrist flexion crease to Bray's line.  This was taken down sharply to the level of the palmar fascia.  Hemostasis was maintained with a bipolar throughout the procedure. The palmar fascia was incised in line with the skin incision.  The transverse fibers of the transverse carpal ligament were identified and released from their ulnar attachment in both a proximal and distal direction.  Distally, the release was completed until the palmar fat was present.  Proximally, the tenotomy scissors were used to bluntly dissect above and below the transverse carpal ligament and it was released under direct visualization into the distal forearm.  Palpation and visualization confirmed complete release of the ligament.  The carpal tunnel was then inspected with findings as listed above. The wound was thoroughly irrigated with normal saline.  The skin was closed with interrupted horizontal mattress 4-0 nylon sutures.  A soft sterile dressing was applied of adaptic, 4x4 gauze, and cezar and then overwrapped with coban applied in a non-constrictive fashion. The patient tolerated the procedure well and there were no immediate complications.  She was brought to the recovery room in stable condition.  All sponge and needle counts were correct at the end of the case.      POSTOPERATIVE PLAN:  The patient will be discharged to home today with oral pain medications.  The patient will elevate and ice.  The dressing should remain clean, dry, and intact for the next five days, at which point it can be removed and replaced with a bandaid. Finger range of motion is encouraged.  The  patient will be seen back in 10-14 days for a wound check and suture removal.

## 2018-11-15 NOTE — DISCHARGE INSTRUCTIONS
Instructions for after your surgery    Remove your dressing after 5 days. At that point, can wash incision with soap and water daily and then pat it dry. Then cover incision with bandaid or tegaderm and gauze. Avoid immersion of incision in water until skin is healed (3-4 weeks)     Keep your operative arm elevated as much as possible. This will help with pain and swelling.     Do not lift anything heavier than a coffee cup with your operative hand. You can use it for light activities like eating and brushing your teeth.    You will have a follow-up appointment scheduled with Dr. Guthrie or Loretta. If you do not know when this appointment is, please call Dr. Guthrie's office to find out.     Call Dr. Guthrie's office if you experience any of the following:   Fevers, chills, increasing wound drainage, pain that is not controlled with the medications you have been prescribing, swelling that is not controlled with elevation, problems with your splint, or any other questions or concerns.

## 2018-11-15 NOTE — IP AVS SNAPSHOT
MRN:[id number]                      After Visit Summary   11/15/2018    Deborah Schoenholz    MRN: [id number]           Thank you!     Thank you for choosing [facility] for your care. Our goal is always to provide you with excellent care. Hearing back from our patients is one way we can continue to improve our services. Please take a few minutes to complete the written survey that you may receive in the mail after you visit with us. Thank you!        Patient Information     Date Of Birth          1950        About your hospital stay     You were admitted on:  November 15, 2018 You last received care in the:  [facility]    You were discharged on:  November 15, 2018       Who to Call     For medical emergencies, please call 911.  For non-urgent questions about your medical care, please call your primary care provider or clinic, [phone]  For questions related to your surgery, please call your surgery clinic        Attending Provider     Provider Specialty    Liban Guthrie MD Orthopedics       Primary Care Provider Office Phone # Fax #    Анна Giraldo [phone]      Your next 10 appointments already scheduled     Nov 27, 2018  9:30 AM CST   (Arrive by 9:15 AM)   Post-Op with  U Ortho Nurse   [facility] ([facility] [place])    [address] [place] [address] [phone]            Dec 26, 2018 10:30 AM CST   (Arrive by 10:15 AM)   RETURN HAND with Liban Guthrie MD   [facility] ([facility] [place])    [address] [place] [id number] [phone]              Further instructions from your care team       Instructions for after your surgery    Remove your dressing after 5 days. At that point, can wash incision with soap and water daily and then pat it dry. Then cover incision with bandaid or tegaderm and  gauze. Avoid immersion of incision in water until skin is healed (3-4 weeks)     Keep your operative arm elevated as much as possible. This will help with pain and swelling.     Do not lift anything heavier than a coffee cup with your operative hand. You can use it for light activities like eating and brushing your teeth.    You will have a follow-up appointment scheduled with Dr. Guthrie or Loretta. If you do not know when this appointment is, please call Dr. Guthrie's office to find out.     Call Dr. Guthrie's office if you experience any of the following:   Fevers, chills, increasing wound drainage, pain that is not controlled with the medications you have been prescribing, swelling that is not controlled with elevation, problems with your splint, or any other questions or concerns.             Pending Results     No orders found from 11/13/2018 to 11/16/2018.            Admission Information     Date & Time Provider Department Dept. Phone    11/15/2018 Liban Guthrie MD [facility] [phone]      Your Vitals Were     Blood Pressure Pulse Temperature Respirations Pulse Oximetry       125/65 82 98.5  F (36.9  C) (Temporal) 14 95%       MyChart Information     [employer] gives you secure access to your electronic health record. If you see a primary care provider, you can also send messages to your care team and make appointments. If you have questions, please call your primary care clinic.  If you do not have a primary care provider, please call [phone] and they will assist you.      [employer] is an electronic gateway that provides easy, online access to your medical records. With [employer], you can request a clinic appointment, read your test results, renew a prescription or communicate with your care team.     To access your existing account, please contact your [facility] or call [phone] for assistance.        Care EveryWhere ID     This is  your Care EveryWhere ID. This could be used by other organizations to access your [place] medical records  [id number]        Equal Access to Services     ANTONY FRYE : Hadii aad ku hadnatanaurora Kathiali, wajonatanda vernonlj, valorieta kameredithda jatin, ely garcia laleonardolynne mario alberto Bartholomew [facility] [phone].    ATENCIÓN: Si habla español, tiene a garcia disposición servicios gratuitos de asistencia lingüística. Llame al [phone].    We comply with applicable federal civil rights laws and [place] laws. We do not discriminate on the basis of race, color, national origin, age, disability, sex, sexual orientation, or gender identity.               Review of your medicines      START taking        Dose / Directions    HYDROcodone-acetaminophen 5-325 MG per tablet   Commonly known as:  NORCO   Used for:  Carpal tunnel syndrome of right wrist        Dose:  1 tablet   Take 1 tablet by mouth every 6 hours as needed for severe pain   Quantity:  10 tablet   Refills:  0         CONTINUE these medicines which have NOT CHANGED        Dose / Directions    ascorbic acid 500 MG tablet   Commonly known as:  VITAMIN C        Dose:  1 tablet   Take 1 tablet by mouth daily.   Refills:  0       CALCIUM 600 + MINERALS PO        Dose:  1 tablet   1 tablet daily   Refills:  0       VITAMIN B COMPLEX PO        Refills:  0       zolpidem 5 MG tablet   Commonly known as:  AMBIEN   Used for:  Persistent insomnia        Dose:  5 mg   Take 1 tablet (5 mg) by mouth nightly as needed for sleep   Quantity:  30 tablet   Refills:  1            Where to get your medicines      Some of these will need a paper prescription and others can be bought over the counter. Ask your nurse if you have questions.     Bring a paper prescription for each of these medications     HYDROcodone-acetaminophen 5-325 MG per tablet                Protect others around you: Learn how to safely use, store and throw away your medicines at www.disposemymeds.org.         Information about OPIOIDS     PRESCRIPTION OPIOIDS: WHAT YOU NEED TO KNOW   We gave you an opioid (narcotic) pain medicine. It is important to manage your pain, but opioids are not always the best choice. You should first try all the other options your care team gave you. Take this medicine for as short a time (and as few doses) as possible.    Some activities can increase your pain, such as bandage changes or therapy sessions. It may help to take your pain medicine 30 to 60 minutes before these activities. Reduce your stress by getting enough sleep, working on hobbies you enjoy and practicing relaxation or meditation. Talk to your care team about ways to manage your pain beyond prescription opioids.    These medicines have risks:    DO NOT drive when on new or higher doses of pain medicine. These medicines can affect your alertness and reaction times, and you could be arrested for driving under the influence (DUI). If you need to use opioids long-term, talk to your care team about driving.    DO NOT operate heavy machinery    DO NOT do any other dangerous activities while taking these medicines.    DO NOT drink any alcohol while taking these medicines.     If the opioid prescribed includes acetaminophen, DO NOT take with any other medicines that contain acetaminophen. Read all labels carefully. Look for the word  acetaminophen  or  Tylenol.  Ask your pharmacist if you have questions or are unsure.    You can get addicted to pain medicines, especially if you have a history of addiction (chemical, alcohol or substance dependence). Talk to your care team about ways to reduce this risk.    All opioids tend to cause constipation. Drink plenty of water and eat foods that have a lot of fiber, such as fruits, vegetables, prune juice, apple juice and high-fiber cereal. Take a laxative (Miralax, milk of magnesia, Colace, Senna) if you don t move your bowels at least every other day. Other side effects include upset  stomach, sleepiness, dizziness, throwing up, tolerance (needing more of the medicine to have the same effect), physical dependence and slowed breathing.    Store your pills in a secure place, locked if possible. We will not replace any lost or stolen medicine. If you don t finish your medicine, please throw away (dispose) as directed by your pharmacist. The [place] [employer]ution [employer] has more information about safe disposal: https://www.pca.[facility].mn.us/living-green/managing-unwanted-medications             Medication List: This is a list of all your medications and when to take them. Check marks below indicate your daily home schedule. Keep this list as a reference.      Medications           Morning Afternoon Evening Bedtime As Needed    ascorbic acid 500 MG tablet   Commonly known as:  VITAMIN C   Take 1 tablet by mouth daily.                                CALCIUM 600 + MINERALS PO   1 tablet daily                                HYDROcodone-acetaminophen 5-325 MG per tablet   Commonly known as:  NORCO   Take 1 tablet by mouth every 6 hours as needed for severe pain                                VITAMIN B COMPLEX PO                                zolpidem 5 MG tablet   Commonly known as:  AMBIEN   Take 1 tablet (5 mg) by mouth nightly as needed for sleep

## 2018-11-24 ASSESSMENT — ENCOUNTER SYMPTOMS
MUSCLE CRAMPS: 0
MUSCLE WEAKNESS: 0
ARTHRALGIAS: 0
NECK PAIN: 0
MYALGIAS: 0
BACK PAIN: 0
STIFFNESS: 0
JOINT SWELLING: 0

## 2018-11-27 ENCOUNTER — OFFICE VISIT (OUTPATIENT)
Dept: ORTHOPEDICS | Facility: CLINIC | Age: 68
End: 2018-11-27
Payer: COMMERCIAL

## 2018-11-27 DIAGNOSIS — G56.01 CARPAL TUNNEL SYNDROME OF RIGHT WRIST: Primary | ICD-10-CM

## 2018-11-27 DIAGNOSIS — G47.00 PERSISTENT INSOMNIA: ICD-10-CM

## 2018-11-27 RX ORDER — ZOLPIDEM TARTRATE 5 MG/1
5 TABLET ORAL
Qty: 30 TABLET | Refills: 1 | Status: SHIPPED | OUTPATIENT
Start: 2018-11-27 | End: 2019-06-20

## 2018-11-27 NOTE — MR AVS SNAPSHOT
After Visit Summary   11/27/2018    Deborah Schoenholz    MRN: [id number]           Patient Information     Date Of Birth          1950        Visit Information        Provider Department      11/27/2018 9:30 AM Nurse, Marisabel BRAXTON [facility]        Today's Diagnoses     Carpal tunnel syndrome of right wrist    -  1       Follow-ups after your visit        Your next 10 appointments already scheduled     Dec 26, 2018 10:30 AM CST   (Arrive by 10:15 AM)   RETURN HAND with Liban Guthrie MD   [facility] ([facility])    [address] [place] [address] [id number] [phone]              Who to contact     Please call your clinic at [phone] to:    Ask questions about your health    Make or cancel appointments    Discuss your medicines    Learn about your test results    Speak to your doctor            Additional Information About Your Visit        MyChart Information     [employer] gives you secure access to your electronic health record. If you see a primary care provider, you can also send messages to your care team and make appointments. If you have questions, please call your primary care clinic.  If you do not have a primary care provider, please call [phone] and they will assist you.      Grocio is an electronic gateway that provides easy, online access to your medical records. With [employer], you can request a clinic appointment, read your test results, renew a prescription or communicate with your care team.     To access your existing account, please contact your [facility] or call [phone] for assistance.        Care EveryWhere ID     This is your Care EveryWhere ID. This could be used by other organizations to access your [place] medical records  [id number]         Blood Pressure from Last 3 Encounters:   11/15/18 125/65   11/01/18 151/86   08/21/18 145/79    Weight from  Last 3 Encounters:   11/01/18 69.9 kg (154 lb 1.6 oz)   09/10/18 69.3 kg (152 lb 12.8 oz)   08/21/18 68.9 kg (151 lb 12.8 oz)              Today, you had the following     No orders found for display       Primary Care Provider Office Phone # Fax #    Анна Giraldo [phone]       [address] [facility] [address]  [facility] [address]        Equal Access to Services     ANTONY FRYE : Hadii aad ku hadasho Soomaali, waaxda luqadaha, qaybta kaalmada adeegyada, waxay idiin hayaan adeeg jose laale menendez. So [facility] [phone].    ATENCIÓN: Si habla español, tiene a garcia disposición servicios gratuitos de asistencia lingüística. [facility] [phone].    We comply with applicable federal civil rights laws and [place] laws. We do not discriminate on the basis of race, color, national origin, age, disability, sex, sexual orientation, or gender identity.            Thank you!     Thank you for choosing [facility]  for your care. Our goal is always to provide you with excellent care. Hearing back from our patients is one way we can continue to improve our services. Please take a few minutes to complete the written survey that you may receive in the mail after your visit with us. Thank you!             Your Updated Medication List - Protect others around you: Learn how to safely use, store and throw away your medicines at www.disposemymeds.org.          This list is accurate as of 11/27/18  9:42 AM.  Always use your most recent med list.                   Brand Name Dispense Instructions for use Diagnosis    CALCIUM 600 + MINERALS PO      1 tablet daily        HYDROcodone-acetaminophen 5-325 MG tablet    NORCO    10 tablet    Take 1 tablet by mouth every 6 hours as needed for severe pain    Carpal tunnel syndrome of right wrist       VITAMIN B COMPLEX PO           vitamin C 500 MG tablet    ASCORBIC ACID     Take 1 tablet by mouth daily.        zolpidem 5 MG tablet    AMBIEN    30 tablet    Take 1  tablet (5 mg) by mouth nightly as needed for sleep    Persistent insomnia

## 2018-11-27 NOTE — TELEPHONE ENCOUNTER
Received refill request for Ambien.  Last in clinic November 2018.      Originally ordered March 2018. To Dr. Giraldo for approval.

## 2018-11-27 NOTE — PROGRESS NOTES
Reason for visit:    Deborah Schoenholz came in to the clinic for a two week post op check.    Her surgery was done 11/15/2018 by Dr Guthrie.  She had right open carpal tunnel release.      Assessment:    Alaina came into the clinic with her incision covered.  She reports she has been using an ointment at home daily on incision.      The Surgical wounds were exposed and found to be reddened.  Small whitish area are distal end of incision.  Minimal drainage on band-aid when removed.    Sutures removed.  Steri strips applied.  Redness marked with pen and patient instructed to watch this closely.  If the incision starts to drain or redness grows we would like her to call our clinic.      Patient was instructed to not use any more ointment.  She can get it wet with water and soap but no submerging into water.  She will keep it covered with a bandaid at work and when doing activities at home.      Plan:    She has our phone number and will call with questions or problems.  If it continues to heal without issues, she will follow up with Dr. Guthrie at 6 weeks post operatively.

## 2019-06-20 DIAGNOSIS — G47.00 PERSISTENT INSOMNIA: ICD-10-CM

## 2019-06-20 RX ORDER — ZOLPIDEM TARTRATE 5 MG/1
5 TABLET ORAL
Qty: 30 TABLET | Refills: 1 | Status: SHIPPED | OUTPATIENT
Start: 2019-06-20 | End: 2020-08-10

## 2019-06-20 NOTE — TELEPHONE ENCOUNTER
Received refill request for ambien.  Last in clinic 11/2018 for annual .Discussed with Dr Giraldo and refill approved, refill called into [facility] on [address]

## 2019-11-04 ENCOUNTER — HEALTH MAINTENANCE LETTER (OUTPATIENT)
Age: 69
End: 2019-11-04

## 2020-02-16 ENCOUNTER — HEALTH MAINTENANCE LETTER (OUTPATIENT)
Age: 70
End: 2020-02-16

## 2020-07-14 ENCOUNTER — MYC REFILL (OUTPATIENT)
Dept: OBGYN | Facility: CLINIC | Age: 70
End: 2020-07-14

## 2020-07-14 DIAGNOSIS — G47.00 PERSISTENT INSOMNIA: ICD-10-CM

## 2020-07-14 NOTE — TELEPHONE ENCOUNTER
This was routed to the wrong OB/GYN pool.    This pt does not see a provider in the [facility] ob/gyn group.

## 2020-07-16 RX ORDER — ZOLPIDEM TARTRATE 5 MG/1
5 TABLET ORAL
Qty: 30 TABLET | Refills: 1 | OUTPATIENT
Start: 2020-07-16

## 2020-08-06 ENCOUNTER — TELEPHONE (OUTPATIENT)
Dept: OBGYN | Facility: CLINIC | Age: 70
End: 2020-08-06

## 2020-08-06 ENCOUNTER — NURSE TRIAGE (OUTPATIENT)
Dept: NURSING | Facility: CLINIC | Age: 70
End: 2020-08-06

## 2020-08-06 NOTE — TELEPHONE ENCOUNTER
MD retired so needs new MD to do a refill. She hasn't had a check up for two years now. Needs refill on zolpidem 5 mg tablets, generic. If you need a new MD and to come in for check up she will do that. Nothing has changed. Pharmacy:  [facility] on [address]. Unable to send an encounter to [facility]. I connected with the page  to give her their phone number and connect her.    Anastasia Cortes RN  [facility]

## 2020-08-06 NOTE — TELEPHONE ENCOUNTER
----- Message from Jailyn Marshall sent at 8/6/2020 12:08 PM CDT -----  Regarding: refill request  Contact: [phone]  Pt called to get a refill on zolpidem (AMBIEN) 5 MG tablet set to [id number] [address] [facility]    Please review and follow up as needed    Thanks  Jailyn

## 2020-08-07 DIAGNOSIS — G47.00 PERSISTENT INSOMNIA: ICD-10-CM

## 2020-08-07 NOTE — TELEPHONE ENCOUNTER
----- Message from Tresa Mason sent at 8/6/2020  3:13 PM CDT -----  Regarding: call back- refill to get through until annual appt  Contact: [phone]  Hello!    Pt requested a refill for her: zolpidem (AMBIEN) 5 MG tablet, and received a message from the clinic stating that she needs to establish care with a new provider prior to them refilling the medication. Pt is scheduled for annual in November to [facility] (pushed to November per instructions for annuals from clinic and ). Pt wondering if she could have a refill of this medication to get her through to that appt? Please call Pt back to discuss if this could be an option. Preferred pharmacy: [facility] #[id number] [address]      Thank you!  Tresa          Please DO NOT send message and or reply back to sender. Call center Representatives DO NOT respond to Messages.

## 2020-08-10 RX ORDER — ZOLPIDEM TARTRATE 5 MG/1
5 TABLET ORAL
Qty: 30 TABLET | Refills: 1 | Status: SHIPPED | OUTPATIENT
Start: 2020-08-10 | End: 2020-11-04

## 2020-10-21 ASSESSMENT — ENCOUNTER SYMPTOMS
HEARTBURN: 1
CONSTIPATION: 1
BLOOD IN STOOL: 0
JAUNDICE: 0
SKIN CHANGES: 0
VOMITING: 0
DIARRHEA: 0
POOR WOUND HEALING: 0
NAUSEA: 0
ABDOMINAL PAIN: 0
RECTAL PAIN: 0
BOWEL INCONTINENCE: 0
NAIL CHANGES: 0
BLOATING: 0

## 2020-10-21 ASSESSMENT — ANXIETY QUESTIONNAIRES
GAD7 TOTAL SCORE: 3
GAD7 TOTAL SCORE: 3
3. WORRYING TOO MUCH ABOUT DIFFERENT THINGS: NOT AT ALL
7. FEELING AFRAID AS IF SOMETHING AWFUL MIGHT HAPPEN: NOT AT ALL
1. FEELING NERVOUS, ANXIOUS, OR ON EDGE: SEVERAL DAYS
7. FEELING AFRAID AS IF SOMETHING AWFUL MIGHT HAPPEN: NOT AT ALL
5. BEING SO RESTLESS THAT IT IS HARD TO SIT STILL: SEVERAL DAYS
4. TROUBLE RELAXING: NOT AT ALL
2. NOT BEING ABLE TO STOP OR CONTROL WORRYING: NOT AT ALL
6. BECOMING EASILY ANNOYED OR IRRITABLE: SEVERAL DAYS

## 2020-11-04 ENCOUNTER — OFFICE VISIT (OUTPATIENT)
Dept: FAMILY MEDICINE | Facility: CLINIC | Age: 70
End: 2020-11-04
Attending: FAMILY MEDICINE
Payer: COMMERCIAL

## 2020-11-04 VITALS
SYSTOLIC BLOOD PRESSURE: 147 MMHG | HEIGHT: 62 IN | DIASTOLIC BLOOD PRESSURE: 72 MMHG | WEIGHT: 158 LBS | BODY MASS INDEX: 29.08 KG/M2 | HEART RATE: 70 BPM

## 2020-11-04 DIAGNOSIS — G47.00 PERSISTENT INSOMNIA: ICD-10-CM

## 2020-11-04 DIAGNOSIS — I10 ESSENTIAL HYPERTENSION: ICD-10-CM

## 2020-11-04 DIAGNOSIS — Z71.6 ENCOUNTER FOR SMOKING CESSATION COUNSELING: ICD-10-CM

## 2020-11-04 DIAGNOSIS — Z23 ENCOUNTER FOR IMMUNIZATION: ICD-10-CM

## 2020-11-04 DIAGNOSIS — Z13.220 LIPID SCREENING: Primary | ICD-10-CM

## 2020-11-04 DIAGNOSIS — Z12.31 ENCOUNTER FOR SCREENING MAMMOGRAM FOR BREAST CANCER: ICD-10-CM

## 2020-11-04 DIAGNOSIS — Z23 NEED FOR PNEUMOCOCCAL VACCINE: ICD-10-CM

## 2020-11-04 PROCEDURE — G0008 ADMIN INFLUENZA VIRUS VAC: HCPCS

## 2020-11-04 PROCEDURE — 99397 PER PM REEVAL EST PAT 65+ YR: CPT | Performed by: FAMILY MEDICINE

## 2020-11-04 PROCEDURE — 250N000011 HC RX IP 250 OP 636

## 2020-11-04 PROCEDURE — 90662 IIV NO PRSV INCREASED AG IM: CPT

## 2020-11-04 PROCEDURE — G0463 HOSPITAL OUTPT CLINIC VISIT: HCPCS | Mod: 25

## 2020-11-04 RX ORDER — ZOLPIDEM TARTRATE 5 MG/1
5 TABLET ORAL
Qty: 30 TABLET | Refills: 1 | Status: SHIPPED | OUTPATIENT
Start: 2020-11-04 | End: 2021-12-02

## 2020-11-04 ASSESSMENT — PAIN SCALES - GENERAL: PAINLEVEL: NO PAIN (0)

## 2020-11-04 ASSESSMENT — MIFFLIN-ST. JEOR: SCORE: 1176.99

## 2020-11-04 ASSESSMENT — PATIENT HEALTH QUESTIONNAIRE - PHQ9
5. POOR APPETITE OR OVEREATING: SEVERAL DAYS
SUM OF ALL RESPONSES TO PHQ QUESTIONS 1-9: 0

## 2020-11-04 ASSESSMENT — ANXIETY QUESTIONNAIRES
6. BECOMING EASILY ANNOYED OR IRRITABLE: NOT AT ALL
2. NOT BEING ABLE TO STOP OR CONTROL WORRYING: NOT AT ALL
1. FEELING NERVOUS, ANXIOUS, OR ON EDGE: SEVERAL DAYS
GAD7 TOTAL SCORE: 4
5. BEING SO RESTLESS THAT IT IS HARD TO SIT STILL: SEVERAL DAYS
7. FEELING AFRAID AS IF SOMETHING AWFUL MIGHT HAPPEN: SEVERAL DAYS
3. WORRYING TOO MUCH ABOUT DIFFERENT THINGS: NOT AT ALL

## 2020-11-04 NOTE — PROGRESS NOTES
"Pt. Here for annual exam    Alaina is a 70 year old No obstetric history on file. female who presents for annual exam.   Are you in the first 12 months of your Medicare Part B coverage?  No    Chronic conditions/additional problems: YES  1. Dry spots/lesions on upper back  2. Hx BCC on face, removed  3. Elevated BP's has been 140's/70's in past, improves with lowering salt in diet. However, retired and less exercise now.    4. Elevated cholesterol--last lab 2018, , HDL 5, due repeat  5. Smoking-- 6 cigs/day  X 30 years never more 1/2 pack.   7. Ambien for travel, 1/month with night sweat 30 tabs/year, also uses for pain     HEALTH MAINTENANCE:    1. Breast Health--no concerns  Last Mammo: 2017, Result: Normal, Next Mammo: order today   Family Hx breast cancer: no     Gynecologic Health:  No LMP recorded. Patient is postmenopausal.. Age 49  OB History   No obstetric history on file.     Pap: last pap  all normal   Hot flashes/night sweats Yes; wake up at night, improved   Vaginal concerns: no  Urinary Concerns: no  Sexual Health:  --Patient is not sexually active.  --History of STI? none  --HIV screening: never--guidelines discussed, declines  --Hep C screening:never--guidelines discussed, declines  ---Sexual concerns: none    Bone Health  DEXA:   normal, consider repeat, lost 1/2\" in last 10 year.  Pt. States would not take any medication for osteoporosis if found on scan, so prefers not to do DEXA  Dairy/Vitamin D: Vitamin D 800 international unit(s) in winter; Calcium supplement; eat dairy  Physical activity: moving, packing house, garden plans on walking, Yoga  Family history osteoporosis:father, grandmother    CV risk  Cholesterol: (  Cholesterol   Date Value Ref Range Status   2017 217 (H) <200 mg/dL Final     Comment:     Desirable:       <200 mg/dl   2015 221 (H) <200 mg/dL Final     Comment:     Desirable:       <200 mg/dl      DM screen: 2017: normal   reports that she has " "been smoking cigarettes. She has a 10.50 pack-year smoking history. She has never used smokeless tobacco.  Tobacco Cessation Action Plan: Information offered: Patient not interested at this time; will do on own  Eligible for Lung Cancer screening: not eligible, less than 30+ pack year  Eligibility Criteria:     Asymptomatic - no signs or symptoms of lung cancer     Age 55 - 79 years (55 - 77 years for Medicare patients)     Current or Former Smoker; if former, must have quit within 15 years     30 + pack-year smoking history (# of packs per day   x  # of years smoking)     Colon Cancer    Last done FIT test 2016; counseled on screening options, elects Cologuard;      Immunizations:  dTaP  2007 due future order  Flu  today  Pneumovax--future order  Shingrix--discussed side effect profile, will defer, but order for elsa  Would like to space out vaccines.        Self Reported Physical Health:    In general, how would you rate your overall physical health? excellent    Do you usually eat at least 4 servings of fruit and vegetables a day, include whole grains & fiber and avoid regularly eating high fat or \"junk\" foods? Yes    Do you have any problems taking medications regularly?  No    Do you have any side effects from medications? not applicable    Needs assistance for the following daily activities: no assistance needed    Which of the following safety concerns are present in your home?  none identified     Hearing impairment: No    Mental Health:    In general, how would you rate your overall mental or emotional health? excellent    PHQ-2 Score:      Do you feel safe in your environment? Yes    Have you ever done Advance Care Planning? (For example, a Health Directive, POLST, or a discussion with a medical provider or your loved ones about your wishes): Yes, patient states has an Advance Care Planning document and will bring a copy to the clinic.    Additional concerns to address? Fall risk:none no falls in last " year, no [facility] Care Team  Current providers sharing in care for this patient include:   Patient Care Team:  Sean Yates MD as PCP - General (Family Practice)  Jose Laura MD as MD (Dermatology)  Last PHQ-9 score on record=   PHQ-9 SCORE 8/26/2015   PHQ-9 Total Score -   PHQ-9 Total Score 0     Last GAD7 score on record=   GANESH-7 SCORE 8/22/2017 10/29/2018 10/21/2020   Total Score 0 (minimal anxiety) 0 (minimal anxiety) 3 (minimal anxiety)   Total Score 0 0 3           @  Patient Active Problem List   Diagnosis     Carpal tunnel syndrome     Insomnia     Nicotine addiction     Past Surgical History:   Procedure Laterality Date     BIOPSY       MOHS MICROGRAPHIC PROCEDURE       RELEASE CARPAL TUNNEL Right 11/15/2018    Procedure: Right Open Carpal Tunnel Release;  Surgeon: Liban Guthrie MD;  Location:  OR      Social History     Tobacco Use     Smoking status: Light Tobacco Smoker     Packs/day: 0.30     Years: 35.00     Pack years: 10.50     Types: Cigarettes     Smokeless tobacco: Never Used     Tobacco comment: 2 packs per week   Substance Use Topics     Alcohol use: Yes     Alcohol/week: 0.0 standard drinks     Comment: one or two cocktails/wine  four or five nights a week      Problem (# of Occurrences) Relation (Name,Age of Onset)    Arthritis (1) Paternal Grandmother    Cancer (1) Father (Katerin Bowden): mesothelioma, worked in fiber glass factory    Diabetes (1) Maternal Grandmother (Yue Merlos)    Eye Disorder (1) Paternal Grandmother    Hypertension (1) Maternal Grandmother (Yue Merlos)       Negative family history of: Melanoma, Skin Cancer            Current Outpatient Medications   Medication Sig     ascorbic acid (VITAMIN C) 500 MG tablet Take 1 tablet by mouth daily.     B Complex Vitamins (VITAMIN B COMPLEX PO)      Calcium Carbonate-Vit D-Min (CALCIUM 600 + MINERALS PO) 1 tablet daily     zolpidem (AMBIEN) 5 MG tablet Take 1 tablet (5 mg) by mouth nightly  "as needed for sleep     No current facility-administered medications for this visit.        Allergies   Allergen Reactions     No Known Allergies        Past medical, surgical, social and family history were reviewed and updated in EPIC.    ROS:   Review of Systems   Answers for HPI/ROS submitted by the patient on 10/21/2020   GANESH 7 TOTAL SCORE: 3  General Symptoms: No  Skin Symptoms: Yes  HENT Symptoms: No  EYE SYMPTOMS: No  HEART SYMPTOMS: No  LUNG SYMPTOMS: No  INTESTINAL SYMPTOMS: Yes  URINARY SYMPTOMS: No  GYNECOLOGIC SYMPTOMS: No  BREAST SYMPTOMS: No  SKELETAL SYMPTOMS: No  BLOOD SYMPTOMS: No  NERVOUS SYSTEM SYMPTOMS: No  MENTAL HEALTH SYMPTOMS: No  Changes in hair: No  Changes in moles/birth marks: No  Itching: No  Changes in nails: No  Acne: No  Hair in places you don't want it: No  Change in facial hair: No  Warts: No  Non-healing sores: No  Scarring: No  Flaking of skin: No  Color changes of hands/feet in cold : No  Sun sensitivity: No  Skin thickening: Yes  Heart burn or indigestion: Yes  Bloating: No  Black stools: No  Fecal incontinence: No  Yellowing of skin or eyes: No  Vomit with blood: No  Change in stools: No    EXAM:  BP (!) 147/72   Pulse 70   Ht 1.562 m (5' 1.5\")   Wt 71.7 kg (158 lb)   Breastfeeding No   BMI 29.37 kg/m     BMI: Body mass index is 29.37 kg/m .    EXAM:  Constitutional: Appearance: Well nourished, well developed alert, in no acute distress  Neck:  Lymph Nodes:  No lymphadenopathy present    Thyroid:  Gland size normal, nontender, no nodules or masses present  on palpation  Chest:  Respiratory Effort:  Breathing unlabored  Cardiovascular:Heart    Auscultation:  Regular rate, normal rhythm, no murmurs present    Gastrointestinal:  Abdominal Examination:  Abdomen nontender to palpation, tone normal without     rigidity or guarding, no masses present, umbilicus without lesions    Liver and speen:  No hepatomegaly present, liver nontender to palpation    Hernias:  No hernias " present  Lymphatic: Lymph Nodes:  No other lymphadenopathy present  Skin:  General Inspection:  No rashes present, no lesions present, no areas of  Discoloration. Skin dry.      Neurologic/Psychiatric:    Mental Status:  Oriented X3           BMI:  Body mass index is 29.37 kg/m .     reports that she has been smoking cigarettes. She has a 10.50 pack-year smoking history. She has never used smokeless tobacco.  Tobacco Cessation Action Plan: Information offered: Patient not interested at this time    ASSESSMENT:  70 year old female with satisfactory annual exam.    ICD-10-CM    1. Persistent insomnia  G47.00 zolpidem (AMBIEN) 5 MG tablet       PLAN:  1. HCM  Immunizations--  Flu vaccine today, dTap, Shingrix, Pneumovax at future visits as patient wishes to space out vaccines; orders in place. Counseling given regarding each vaccine    Cancer screening--Cologuard, Mammogram    CV risk- lipid panel due    Bone Health--Counseled patient as above, declines DEXA as would decline any treatment for osteoporosis  Continue weight bearing activity and adequate Vit D    STI/HIV Screening --Declines HIV, Hep C screening    2. HTN  Counseled nature and treatment of HTN  Discussed options to treat; pt. Elects  trial of lifestyle change and home bp monitoring  x 6 months    3. Persistent insmonia  Dependent on ambien, no current side effects, discussed risks with aging  Continue for now. Recommend to use OTC  Pain relievers (can include prn use naproxen), not ambien for pain     4. Smoking--discussed as above.     Follow-up 6 months          COUNSELING:  Reviewed preventive health counseling, as reflected in patient instructions       Immunizations       Osteoporosis Prevention/Bone Health       Hepatitis C screening      Appropriate preventive services were discussed with this patient, including applicable screening as appropriate for cardiovascular disease, diabetes, osteopenia/osteoporosis, and glaucoma.  As appropriate for  age/gender, discussed screening for colorectal cancer, prostate cancer, breast cancer, and cervical cancer.    Reviewed patients plan of care and provided an AVS. The Basic Care Plan (routine screening as documented in Health Maintenance) for Alaina meets the Care Plan requirement. This Care Plan has been established and reviewed with the Patient.    Counseling Resources:  ATP IV Guidelines  Pooled Cohorts Equation Calculator  Breast Cancer Risk Calculator  FRAX Risk Assessment  ICSI Preventive Guidelines  Dietary Guidelines for Americans, 2010  USDA's MyPlate  ASA Prophylaxis  Lung CA Screening    Sena Yates MD  [facility] [place]

## 2020-11-04 NOTE — LETTER
"2020       RE: Deborah L Schoenholz  [address]  [facility] [address]     Dear Colleague,    Thank you for referring your patient, Deborah L Schoenholz, to the [facility] [place] [facility]. Please see a copy of my visit note below.    Pt. Here for annual exam    Alaina is a 70 year old No obstetric history on file. female who presents for annual exam.   Are you in the first 12 months of your Medicare Part B coverage?  No    Chronic conditions/additional problems: YES  1. Dry spots/lesions on upper back  2. Hx BCC on face, removed  3. Elevated BP's has been 140's/70's in past, improves with lowering salt in diet. However, retired and less exercise now.    4. Elevated cholesterol--last lab 2018, , HDL 5, due repeat  5. Smoking-- 6 cigs/day  X 30 years never more 1/2 pack.   7. Ambien for travel, 1/month with night sweat 30 tabs/year, also uses for pain     HEALTH MAINTENANCE:    1. Breast Health--no concerns  Last Mammo: 2017, Result: Normal, Next Mammo: order today   Family Hx breast cancer: no     Gynecologic Health:  No LMP recorded. Patient is postmenopausal.. Age 49  OB History   No obstetric history on file.     Pap: last pap  all normal   Hot flashes/night sweats Yes; wake up at night, improved   Vaginal concerns: no  Urinary Concerns: no  Sexual Health:  --Patient is not sexually active.  --History of STI? none  --HIV screening: never--guidelines discussed, declines  --Hep C screening:never--guidelines discussed, declines  ---Sexual concerns: none    Bone Health  DEXA:   normal, consider repeat, lost 1/2\" in last 10 year.  Pt. States would not take any medication for osteoporosis if found on scan, so prefers not to do DEXA  Dairy/Vitamin D: Vitamin D 800 international unit(s) in winter; Calcium supplement; eat dairy  Physical activity: moving, packing house, garden plans on walking, Yoga  Family history " "osteoporosis:father, grandmother    CV risk  Cholesterol: (  Cholesterol   Date Value Ref Range Status   09/25/2017 217 (H) <200 mg/dL Final     Comment:     Desirable:       <200 mg/dl   11/23/2015 221 (H) <200 mg/dL Final     Comment:     Desirable:       <200 mg/dl      DM screen: 2017: normal   reports that she has been smoking cigarettes. She has a 10.50 pack-year smoking history. She has never used smokeless tobacco.  Tobacco Cessation Action Plan: Information offered: Patient not interested at this time; will do on own  Eligible for Lung Cancer screening: not eligible, less than 30+ pack year  Eligibility Criteria:     Asymptomatic - no signs or symptoms of lung cancer     Age 55 - 79 years (55 - 77 years for Medicare patients)     Current or Former Smoker; if former, must have quit within 15 years     30 + pack-year smoking history (# of packs per day   x  # of years smoking)     Colon Cancer    Last done FIT test 2016; counseled on screening options, elects Cologuard;      Immunizations:  dTaP  2007 due future order  Flu  today  Pneumovax--future order  Shingrix--discussed side effect profile, will defer, but order for elsa  Would like to space out vaccines.        Self Reported Physical Health:    In general, how would you rate your overall physical health? excellent    Do you usually eat at least 4 servings of fruit and vegetables a day, include whole grains & fiber and avoid regularly eating high fat or \"junk\" foods? Yes    Do you have any problems taking medications regularly?  No    Do you have any side effects from medications? not applicable    Needs assistance for the following daily activities: no assistance needed    Which of the following safety concerns are present in your home?  none identified     Hearing impairment: No    Mental Health:    In general, how would you rate your overall mental or emotional health? excellent    PHQ-2 Score:      Do you feel safe in your environment? Yes    Have " you ever done Advance Care Planning? (For example, a Health Directive, POLST, or a discussion with a medical provider or your loved ones about your wishes): Yes, patient states has an Advance Care Planning document and will bring a copy to the clinic.    Additional concerns to address? Fall risk:none no falls in last year, no [facility] Care Team  Current providers sharing in care for this patient include:   Patient Care Team:  Sean Yates MD as PCP - General (Family Practice)  Jose Laura MD as MD (Dermatology)  Last PHQ-9 score on record=   PHQ-9 SCORE 8/26/2015   PHQ-9 Total Score -   PHQ-9 Total Score 0     Last GAD7 score on record=   GANESH-7 SCORE 8/22/2017 10/29/2018 10/21/2020   Total Score 0 (minimal anxiety) 0 (minimal anxiety) 3 (minimal anxiety)   Total Score 0 0 3           @  Patient Active Problem List   Diagnosis     Carpal tunnel syndrome     Insomnia     Nicotine addiction     Past Surgical History:   Procedure Laterality Date     BIOPSY       MOHS MICROGRAPHIC PROCEDURE       RELEASE CARPAL TUNNEL Right 11/15/2018    Procedure: Right Open Carpal Tunnel Release;  Surgeon: Liban Guthrie MD;  Location:  OR      Social History     Tobacco Use     Smoking status: Light Tobacco Smoker     Packs/day: 0.30     Years: 35.00     Pack years: 10.50     Types: Cigarettes     Smokeless tobacco: Never Used     Tobacco comment: 2 packs per week   Substance Use Topics     Alcohol use: Yes     Alcohol/week: 0.0 standard drinks     Comment: one or two cocktails/wine  four or five nights a week      Problem (# of Occurrences) Relation (Name,Age of Onset)    Arthritis (1) Paternal Grandmother    Cancer (1) Father (Katerin Bowden): mesothelioma, worked in fiber glass factory    Diabetes (1) Maternal Grandmother (Yue Merlos)    Eye Disorder (1) Paternal Grandmother    Hypertension (1) Maternal Grandmother (Yue Chelita)       Negative family history of: Melanoma, Skin Cancer         "    Current Outpatient Medications   Medication Sig     ascorbic acid (VITAMIN C) 500 MG tablet Take 1 tablet by mouth daily.     B Complex Vitamins (VITAMIN B COMPLEX PO)      Calcium Carbonate-Vit D-Min (CALCIUM 600 + MINERALS PO) 1 tablet daily     zolpidem (AMBIEN) 5 MG tablet Take 1 tablet (5 mg) by mouth nightly as needed for sleep     No current facility-administered medications for this visit.        Allergies   Allergen Reactions     No Known Allergies        Past medical, surgical, social and family history were reviewed and updated in EPIC.    ROS:   Review of Systems   Answers for HPI/ROS submitted by the patient on 10/21/2020   GANESH 7 TOTAL SCORE: 3  General Symptoms: No  Skin Symptoms: Yes  HENT Symptoms: No  EYE SYMPTOMS: No  HEART SYMPTOMS: No  LUNG SYMPTOMS: No  INTESTINAL SYMPTOMS: Yes  URINARY SYMPTOMS: No  GYNECOLOGIC SYMPTOMS: No  BREAST SYMPTOMS: No  SKELETAL SYMPTOMS: No  BLOOD SYMPTOMS: No  NERVOUS SYSTEM SYMPTOMS: No  MENTAL HEALTH SYMPTOMS: No  Changes in hair: No  Changes in moles/birth marks: No  Itching: No  Changes in nails: No  Acne: No  Hair in places you don't want it: No  Change in facial hair: No  Warts: No  Non-healing sores: No  Scarring: No  Flaking of skin: No  Color changes of hands/feet in cold : No  Sun sensitivity: No  Skin thickening: Yes  Heart burn or indigestion: Yes  Bloating: No  Black stools: No  Fecal incontinence: No  Yellowing of skin or eyes: No  Vomit with blood: No  Change in stools: No    EXAM:  BP (!) 147/72   Pulse 70   Ht 1.562 m (5' 1.5\")   Wt 71.7 kg (158 lb)   Breastfeeding No   BMI 29.37 kg/m     BMI: Body mass index is 29.37 kg/m .    EXAM:  Constitutional: Appearance: Well nourished, well developed alert, in no acute distress  Neck:  Lymph Nodes:  No lymphadenopathy present    Thyroid:  Gland size normal, nontender, no nodules or masses present  on palpation  Chest:  Respiratory Effort:  Breathing unlabored  Cardiovascular:Heart    Auscultation:  " Regular rate, normal rhythm, no murmurs present    Gastrointestinal:  Abdominal Examination:  Abdomen nontender to palpation, tone normal without     rigidity or guarding, no masses present, umbilicus without lesions    Liver and speen:  No hepatomegaly present, liver nontender to palpation    Hernias:  No hernias present  Lymphatic: Lymph Nodes:  No other lymphadenopathy present  Skin:  General Inspection:  No rashes present, no lesions present, no areas of  Discoloration. Skin dry.      Neurologic/Psychiatric:    Mental Status:  Oriented X3           BMI:  Body mass index is 29.37 kg/m .     reports that she has been smoking cigarettes. She has a 10.50 pack-year smoking history. She has never used smokeless tobacco.  Tobacco Cessation Action Plan: Information offered: Patient not interested at this time    ASSESSMENT:  70 year old female with satisfactory annual exam.    ICD-10-CM    1. Persistent insomnia  G47.00 zolpidem (AMBIEN) 5 MG tablet       PLAN:  1. HCM  Immunizations--  Flu vaccine today, dTap, Shingrix, Pneumovax at future visits as patient wishes to space out vaccines; orders in place. Counseling given regarding each vaccine    Cancer screening--Cologuard, Mammogram    CV risk- lipid panel due    Bone Health--Counseled patient as above, declines DEXA as would decline any treatment for osteoporosis  Continue weight bearing activity and adequate Vit D    STI/HIV Screening --Declines HIV, Hep C screening    2. HTN  Counseled nature and treatment of HTN  Discussed options to treat; pt. Elects  trial of lifestyle change and home bp monitoring  x 6 months    3. Persistent insmonia  Dependent on ambien, no current side effects, discussed risks with aging  Continue for now. Recommend to use OTC  Pain relievers (can include prn use naproxen), not ambien for pain     4. Smoking--discussed as above.     Follow-up 6 months          COUNSELING:  Reviewed preventive health counseling, as reflected in patient  instructions       Immunizations       Osteoporosis Prevention/Bone Health       Hepatitis C screening      Appropriate preventive services were discussed with this patient, including applicable screening as appropriate for cardiovascular disease, diabetes, osteopenia/osteoporosis, and glaucoma.  As appropriate for age/gender, discussed screening for colorectal cancer, prostate cancer, breast cancer, and cervical cancer.    Reviewed patients plan of care and provided an AVS. The Basic Care Plan (routine screening as documented in Health Maintenance) for Alaina meets the Care Plan requirement. This Care Plan has been established and reviewed with the Patient.    Counseling Resources:  ATP IV Guidelines  Pooled Cohorts Equation Calculator  Breast Cancer Risk Calculator  FRAX Risk Assessment  ICSI Preventive Guidelines  Dietary Guidelines for Americans, 2010  USDA's MyPlate  ASA Prophylaxis  Lung CA Screening    Sean Yates MD  [facility] WOMEN[facility]S [facility]IC [place]

## 2020-11-10 ENCOUNTER — ANCILLARY PROCEDURE (OUTPATIENT)
Dept: MAMMOGRAPHY | Facility: CLINIC | Age: 70
End: 2020-11-10
Attending: FAMILY MEDICINE
Payer: COMMERCIAL

## 2020-11-10 DIAGNOSIS — Z12.31 ENCOUNTER FOR SCREENING MAMMOGRAM FOR BREAST CANCER: ICD-10-CM

## 2020-11-10 DIAGNOSIS — Z12.11 COLON CANCER SCREENING: ICD-10-CM

## 2020-11-10 LAB — HEMOCCULT STL QL IA: NEGATIVE

## 2020-11-10 PROCEDURE — 82274 ASSAY TEST FOR BLOOD FECAL: CPT | Performed by: FAMILY MEDICINE

## 2020-11-10 PROCEDURE — 77067 SCR MAMMO BI INCL CAD: CPT | Mod: 26 | Performed by: STUDENT IN AN ORGANIZED HEALTH CARE EDUCATION/TRAINING PROGRAM

## 2020-11-10 PROCEDURE — 77067 SCR MAMMO BI INCL CAD: CPT

## 2020-11-16 NOTE — RESULT ENCOUNTER NOTE
Dear Buffy,     Here are your recent results which are within the expected range. Please continue with your current plan of care and let us know if you have any questions or concerns.    Regards,  Sean Yates MD

## 2021-01-22 DIAGNOSIS — Z13.220 LIPID SCREENING: ICD-10-CM

## 2021-01-22 LAB
CHOLEST SERPL-MCNC: 224 MG/DL
HDLC SERPL-MCNC: 82 MG/DL
LDLC SERPL CALC-MCNC: 123 MG/DL
NONHDLC SERPL-MCNC: 142 MG/DL
TRIGL SERPL-MCNC: 96 MG/DL

## 2021-01-22 PROCEDURE — 80061 LIPID PANEL: CPT | Performed by: FAMILY MEDICINE

## 2021-01-22 PROCEDURE — 36415 COLL VENOUS BLD VENIPUNCTURE: CPT | Performed by: FAMILY MEDICINE

## 2021-02-02 NOTE — RESULT ENCOUNTER NOTE
Dear Buffy,     Here are your recent results which are within the expected normal range. Please continue with your current plan of care and let us know if you have any questions or concerns.    Regards,  Sean Yates MD

## 2021-04-23 DIAGNOSIS — G47.00 PERSISTENT INSOMNIA: ICD-10-CM

## 2021-04-23 RX ORDER — ZOLPIDEM TARTRATE 5 MG/1
5 TABLET ORAL
Qty: 30 TABLET | Refills: 1 | OUTPATIENT
Start: 2021-04-23

## 2021-05-03 NOTE — TELEPHONE ENCOUNTER
Refill request received for zolpidem. Routed to Dr. Yates, refused stating if patient is using more than 1x a month, she needs to be seen.     [employer] message sent to patient to inform her of Dr. Yates's recommendation.

## 2021-09-19 ENCOUNTER — HEALTH MAINTENANCE LETTER (OUTPATIENT)
Age: 71
End: 2021-09-19

## 2021-12-01 PROBLEM — R03.0 ELEVATED BLOOD PRESSURE READING WITHOUT DIAGNOSIS OF HYPERTENSION: Status: ACTIVE | Noted: 2021-12-01

## 2021-12-01 NOTE — PATIENT INSTRUCTIONS
I kindly request that you check your MyChart prior to all appointments with me and complete any assigned questionnaires ahead of time.  (Or you can come a bit early to your appointment and complete questionnaires on one of our tablets.)  This frees up more of our designated visit time to address your health issues. If you have not already done so, I encourage you to sign up for Mychart ([email or web address]).  This will allow you to review your results, securely communicate with your provider care team, and schedule virtual visits as well.    FYI:  I do telehealth (video) visits exclusively on Wednesdays.  I still do in-person visits at [facility] ([phone]) on Mondays, Tuesdays and Thursdays.  You can schedule a video visit for many conditions.  Please follow this link:  [email or web address]    To schedule any ordered imaging studies (including mammogram) you can call [facility] Sched[facility] at [phone].  If you are scheduling a DEXA (bone density) scan please do NOT schedule this at the [facility].  Please ask that it be done at [facility] in [place].  Other preferred DEXA locations include [place] (at the [facility]), [place], or [place].        Patient Education   Personalized Prevention Plan  You are due for the preventive services outlined below.  Your care team is available to assist you in scheduling these services.  If you have already completed any of these items, please share that information with your care team to update in your medical record.  Health Maintenance Due   Topic Date Due     ANNUAL REVIEW OF HM ORDERS  Never done     Zoster (Shingles) Vaccine (1 of 2) Never done     LUNG CANCER SCREENING  Never done     Pneumococcal Vaccine (1 of 1 - PPSV23) Never done     FALL RISK ASSESSMENT  11/04/2021     Colorectal Cancer Screening  11/10/2021         Patient Education     Controlling High Blood Pressure   High blood pressure (hypertension) is often called the silent killer. This is because many people who have it, don t know it. It can be very dangerous. High blood pressure can raise your risk of heart attack, stroke, heart disease, and heart failure. Controlling your blood pressure can decrease your risk of these problems. It's important to know the appropriate blood pressure range and remember to check your blood pressure regularly. Doing so can save your life.   Blood pressure measurements are given as 2 numbers. Systolic blood pressure is the upper number. This is the pressure when the heart contracts. Diastolic blood pressure is the lower number. This is the pressure when the heart relaxes between beats.   Blood pressure is categorized as normal, elevated, or stage 1 or stage 2 high blood pressure:     Normal blood pressure is systolic of less than 120 and diastolic of less than 80 (120/80)    Elevated blood pressure is systolic of 120 to 129 and diastolic less than 80    Stage 1 high blood pressure is systolic of 130 to 139 or diastolic between 80 to 89    Stage 2 high blood pressure is when systolic is 140 or higher or the diastolic is 90 or higher  A heart-healthy lifestyle can help you control your blood pressure without medicines. Here are some things you can do to pursue a heart-healthy lifestyle:     Choose heart-healthy foods     Select low-salt, low-fat foods. Limit sodium intake to 2,400 mg per day or the amount suggested by your healthcare provider.    Limit canned, dried, cured, packaged, and fast foods. These can contain a lot of salt.    Eat 8 to 10 servings of fruits and vegetables every day.    Choose lean meats, fish, or chicken.    Eat whole-grain pasta, brown rice, and beans.    Eat 2 to 3 servings of low-fat or fat-free dairy products.    Ask your doctor about the DASH eating plan. This plan helps reduce blood pressure.    When  you go to a restaurant, ask that your meal be prepared with no added salt.    Stay at a healthy weight     Ask your healthcare provider how many calories to eat a day. Then stick to that number.    Ask your healthcare provider what weight range is healthiest for you. If you are overweight, a weight loss of only 3% to 5% of your body weight can help lower blood pressure. Generally, a good weight loss goal is to lose 10% of your body weight in a year.    Limit snacks and sweets.    Get regular exercise.    Get up and get active     Find activities you enjoy that can be done alone or with friends or family. Such activities might include bicycling, dancing, walking, or jogging.    Park farther away from building entrances to walk more.    Use stairs instead of the elevator.    When you can, walk or bike instead of driving.    [place] leaves, garden, or do household repairs.    Be active at a moderate to vigorous level of physical activity for at least 40 minutes for a minimum of 3 to 4 days a week.     Manage stress     Make time to relax and enjoy life. Find time to laugh.    Communicate your concerns with your loved ones and your healthcare provider.    Visit with family and friends, and keep up with hobbies.    Limit alcohol and quit smoking     Men should have no more than 2 drinks per day.    Women should have no more than 1 drink per day.    Talk with your healthcare provider about quitting smoking. Smoking significantly increases your risk for heart disease and stroke. Ask your healthcare provider about community smoking cessation programs and other options.    Medicines  If lifestyle changes aren t enough, your healthcare provider may prescribe high blood pressure medicine. Take all medicines as prescribed. If you have any questions about your medicines, ask your healthcare provider before stopping or changing them.   [employer] last reviewed this educational content on 6/1/2019 2000-2021 [employer]  [employer]. All rights reserved. This information is not intended as a substitute for professional medical care. Always follow your healthcare professional's instructions.

## 2021-12-02 ENCOUNTER — OFFICE VISIT (OUTPATIENT)
Dept: FAMILY MEDICINE | Facility: CLINIC | Age: 71
End: 2021-12-02
Payer: COMMERCIAL

## 2021-12-02 VITALS
DIASTOLIC BLOOD PRESSURE: 83 MMHG | OXYGEN SATURATION: 94 % | SYSTOLIC BLOOD PRESSURE: 161 MMHG | TEMPERATURE: 97.8 F | RESPIRATION RATE: 16 BRPM | HEIGHT: 61 IN | BODY MASS INDEX: 29.45 KG/M2 | HEART RATE: 78 BPM | WEIGHT: 156 LBS

## 2021-12-02 DIAGNOSIS — R03.0 ELEVATED BLOOD PRESSURE READING WITHOUT DIAGNOSIS OF HYPERTENSION: ICD-10-CM

## 2021-12-02 DIAGNOSIS — Z72.0 TOBACCO ABUSE: ICD-10-CM

## 2021-12-02 DIAGNOSIS — G47.00 PERSISTENT INSOMNIA: ICD-10-CM

## 2021-12-02 DIAGNOSIS — Z12.11 SCREEN FOR COLON CANCER: ICD-10-CM

## 2021-12-02 DIAGNOSIS — Z00.00 ENCOUNTER FOR MEDICARE ANNUAL WELLNESS EXAM: Primary | ICD-10-CM

## 2021-12-02 LAB
ALBUMIN UR-MCNC: NEGATIVE MG/DL
APPEARANCE UR: CLEAR
BACTERIA #/AREA URNS HPF: ABNORMAL /HPF
BILIRUB UR QL STRIP: NEGATIVE
COLOR UR AUTO: YELLOW
GLUCOSE UR STRIP-MCNC: NEGATIVE MG/DL
HGB UR QL STRIP: NEGATIVE
KETONES UR STRIP-MCNC: NEGATIVE MG/DL
LEUKOCYTE ESTERASE UR QL STRIP: ABNORMAL
NITRATE UR QL: NEGATIVE
PH UR STRIP: 7.5 [PH] (ref 5–7)
RBC #/AREA URNS AUTO: ABNORMAL /HPF
SP GR UR STRIP: 1.02 (ref 1–1.03)
SQUAMOUS #/AREA URNS AUTO: ABNORMAL /LPF
UROBILINOGEN UR STRIP-ACNC: 0.2 E.U./DL
WBC #/AREA URNS AUTO: ABNORMAL /HPF

## 2021-12-02 PROCEDURE — 36415 COLL VENOUS BLD VENIPUNCTURE: CPT | Performed by: FAMILY MEDICINE

## 2021-12-02 PROCEDURE — 99213 OFFICE O/P EST LOW 20 MIN: CPT | Mod: 25 | Performed by: FAMILY MEDICINE

## 2021-12-02 PROCEDURE — 81001 URINALYSIS AUTO W/SCOPE: CPT | Performed by: FAMILY MEDICINE

## 2021-12-02 PROCEDURE — 80048 BASIC METABOLIC PNL TOTAL CA: CPT | Performed by: FAMILY MEDICINE

## 2021-12-02 PROCEDURE — 99397 PER PM REEVAL EST PAT 65+ YR: CPT | Performed by: FAMILY MEDICINE

## 2021-12-02 RX ORDER — ZOLPIDEM TARTRATE 5 MG/1
5 TABLET ORAL
Qty: 30 TABLET | Refills: 0 | Status: SHIPPED | OUTPATIENT
Start: 2021-12-02 | End: 2022-06-16

## 2021-12-02 ASSESSMENT — MIFFLIN-ST. JEOR: SCORE: 1154.99

## 2021-12-02 ASSESSMENT — ENCOUNTER SYMPTOMS
MYALGIAS: 0
CHILLS: 0
ABDOMINAL PAIN: 0
FEVER: 0
HEADACHES: 0
WEAKNESS: 0
FREQUENCY: 0
NAUSEA: 0
COUGH: 0
ARTHRALGIAS: 0
PARESTHESIAS: 0
BREAST MASS: 0
PALPITATIONS: 0
DIZZINESS: 0
SHORTNESS OF BREATH: 0
SORE THROAT: 0
EYE PAIN: 0
DIARRHEA: 0
CONSTIPATION: 0
HEARTBURN: 1
NERVOUS/ANXIOUS: 0
JOINT SWELLING: 0
HEMATURIA: 0
DYSURIA: 0
HEMATOCHEZIA: 0

## 2021-12-02 ASSESSMENT — ACTIVITIES OF DAILY LIVING (ADL): CURRENT_FUNCTION: NO ASSISTANCE NEEDED

## 2021-12-03 LAB
ANION GAP SERPL CALCULATED.3IONS-SCNC: 5 MMOL/L (ref 3–14)
BUN SERPL-MCNC: 20 MG/DL (ref 7–30)
CALCIUM SERPL-MCNC: 9 MG/DL (ref 8.5–10.1)
CHLORIDE BLD-SCNC: 107 MMOL/L (ref 94–109)
CO2 SERPL-SCNC: 27 MMOL/L (ref 20–32)
CREAT SERPL-MCNC: 0.57 MG/DL (ref 0.52–1.04)
GFR SERPL CREATININE-BSD FRML MDRD: >90 ML/MIN/1.73M2
GLUCOSE BLD-MCNC: 115 MG/DL (ref 70–99)
POTASSIUM BLD-SCNC: 4.7 MMOL/L (ref 3.4–5.3)
SODIUM SERPL-SCNC: 139 MMOL/L (ref 133–144)

## 2021-12-03 NOTE — RESULT ENCOUNTER NOTE
Jw Baer,  Your labs were within acceptable limits.  This includes urinalysis and basic metabolic panel results (blood salts, blood sugar, and kidney function).  Minor highs/lows were noted which are not clinically significant.       Work on the low-sodium, high potassium diet (including low-fat dairy products).  Potassium-rich foods would include bananas, melons (honey dew, cantaloupe, watermelon), kiwi, pears, dried fruit (such as apricots, raisins and dates), potatoes (including sweet potatoes), asparagus, avocado, broccoli, spinach, beans (white ,red, marie, lima), tomato sauce, salmon, scallops, and tuna.      Talia Norman MD

## 2022-01-13 DIAGNOSIS — G47.00 PERSISTENT INSOMNIA: ICD-10-CM

## 2022-01-13 RX ORDER — ZOLPIDEM TARTRATE 5 MG/1
TABLET ORAL
Qty: 30 TABLET | OUTPATIENT
Start: 2022-01-13

## 2022-01-13 NOTE — TELEPHONE ENCOUNTER
Routing refill request to provider for review/approval because:  Drug not on the [facility] refill protocol     Last Written Prescription Date:  12/02/2021  Last Fill Quantity: 30,  # refills: 0   Last office visit: 12/2/2021 with prescribing provider:     Future Office Visit:

## 2022-01-13 NOTE — TELEPHONE ENCOUNTER
Declined.  I just gave her #30 a month ago.  That was to last 6 months.  Is she requesting a refill again?  (Or did this come from the pharmacy?)  If she already ran through the #30 I gave her a month ago that would indicate a remarkable increase in her use of this controlled substance and we would need to have a conversation about her use - video visit would be fine.    Talia Norman MD

## 2022-01-14 ENCOUNTER — APPOINTMENT (OUTPATIENT)
Dept: URGENT CARE | Facility: CLINIC | Age: 72
End: 2022-01-14
Payer: COMMERCIAL

## 2022-01-14 ENCOUNTER — MYC MEDICAL ADVICE (OUTPATIENT)
Dept: FAMILY MEDICINE | Facility: CLINIC | Age: 72
End: 2022-01-14
Payer: COMMERCIAL

## 2022-01-14 NOTE — TELEPHONE ENCOUNTER
Dr. Norman- please review update from patient regarding zolpidem usage.     RN talked to her this AM- see refill request encounter from 1/13/22 for details.     RICCARDO Galvin RN  [facility]

## 2022-01-14 NOTE — TELEPHONE ENCOUNTER
"RN called patient.     She says she did request them, but only because she is trying to keep ahead of them due to something regarding insurance     RN relayed the message that this #30 was to last  6 mo, patient says \"My understanding was that I was to get this every 3 months, I have used about 1/3 of the pills\"    Overall- she does not need a refill right now.     RICCARDO Galvin RN  [facility]    "

## 2022-05-26 ENCOUNTER — OFFICE VISIT (OUTPATIENT)
Dept: URGENT CARE | Facility: URGENT CARE | Age: 72
End: 2022-05-26
Payer: COMMERCIAL

## 2022-05-26 VITALS
HEIGHT: 61 IN | SYSTOLIC BLOOD PRESSURE: 122 MMHG | BODY MASS INDEX: 28.89 KG/M2 | DIASTOLIC BLOOD PRESSURE: 76 MMHG | OXYGEN SATURATION: 96 % | HEART RATE: 73 BPM | WEIGHT: 153 LBS | RESPIRATION RATE: 16 BRPM | TEMPERATURE: 98.2 F

## 2022-05-26 DIAGNOSIS — H92.02 OTALGIA OF LEFT EAR: Primary | ICD-10-CM

## 2022-05-26 DIAGNOSIS — R51.9 FACIAL PAIN: ICD-10-CM

## 2022-05-26 PROCEDURE — 99213 OFFICE O/P EST LOW 20 MIN: CPT | Performed by: PHYSICIAN ASSISTANT

## 2022-05-26 NOTE — PATIENT INSTRUCTIONS
Keep a diary of sx      Follow up with primary care next week.      Consider dx of trigeminal neuralgia.

## 2022-05-26 NOTE — PROGRESS NOTES
"Assessment & Plan     Otalgia of left ear      Facial pain    4 day hx of faical pain and otalgia that waxes and wanes but is episodic and short lived lasting minutes.  Generally less frequent today but not resolved.  No obvious dental etiology. No rash suggestive of shingles. Does sound much like nerve pain but difficult to say for certain.  May be trigeminal neuralgia, madibular branch.    Recommend pt keep a diary.  May try ice, heat, monitor for rash.    She has appt with her pcp next week and that is a good time to recheck.  Consider MRI given her hx of tobacco use though can not see any obvious mass or lesion.  No obivous parotid gland swelling or tenderness at this time.           KARINA Crawford [facility] [place]    aHrish Baer is a 72 year old female who presents to clinic today for the following health issues:  Chief Complaint   Patient presents with     Urgent Care     Pain     Left side of face pain, ear, jaw x 4 days      HPI    intermittent episodes of \"cold in the ear\" tender in neck and head.  Normally takes 4-5 days to improve.  This time L lower facial pain, radiates to the L jaw,  burning in the tongue and roof of the mouth, sharp pain, suruges and then lightens over 10 minutes.  Pain in the L ear, jaw, mouth. Denies dental pain.  No uri sx.    Happens every few hours.    Responds to vitamin C drops.    Can wake at night.   Benadryl not helpful.    Aleve: may have been helpful   Today has had every 2 hours today.    Achy pressure behind eye. No forehead sx.    No rash.    No sores in the mouth.          Review of Systems  Constitutional, HEENT, cardiovascular, pulmonary, gi and gu systems are negative, except as otherwise noted.      Objective    /76   Pulse 73   Temp 98.2  F (36.8  C) (Temporal)   Resp 16   Ht 1.549 m (5' 1\")   Wt 69.4 kg (153 lb)   SpO2 96%   BMI 28.91 kg/m    Physical Exam   Pt is in no acute distress and appears " well  Ears patent B:  TM s intact, non-injected. All land marks easily visibile    Nasal mucosa is non-edematous, no discharge.    Pharynx: non erythematous, tonsils non hypertrophied, No exudate   Neck supple: no adenopathy  Lungs: CTA  Heart: RRR, no murmur, no thrills or heaves   Ext: no edema  Skin: no rashes    dentiion intact, no obvious dental abscess or oral ulcerations.

## 2022-05-31 ENCOUNTER — VIRTUAL VISIT (OUTPATIENT)
Dept: FAMILY MEDICINE | Facility: CLINIC | Age: 72
End: 2022-05-31
Payer: COMMERCIAL

## 2022-05-31 DIAGNOSIS — H92.02 LEFT EAR PAIN: ICD-10-CM

## 2022-05-31 DIAGNOSIS — R51.9 FACIAL PAIN: Primary | ICD-10-CM

## 2022-05-31 PROCEDURE — 99213 OFFICE O/P EST LOW 20 MIN: CPT | Mod: 95 | Performed by: FAMILY MEDICINE

## 2022-05-31 NOTE — PROGRESS NOTES
"Buffy is a 72 year old who is being evaluated via a billable telephone visit.          Assessment & Plan     Facial pain  Left ear pain  Improving some, but not resolved.  Referral to ENT given.  No history of mechanical injury to the trigeminal nerve noted.  No report of rash consistent with shingles.  Denies parotid gland swelling-reports history of parotid gland swelling and remembers what that that felt like.  - Adult ENT  Referral; Future          BMI:   Estimated body mass index is 28.91 kg/m  as calculated from the following:    Height as of 5/26/22: 1.549 m (5' 1\").    Weight as of 5/26/22: 69.4 kg (153 lb).             Return in about 2 weeks (around 6/14/2022) for Schedule with ENT..    Talia Billings MD   [facility]    Harish Baer is a 72 year old who presents for the following health issues     History of Present Illness       Reason for visit:  See new health issue  Symptom onset:  3-7 days ago  Symptoms include:  Pain in left side of face  Symptom intensity:  Severe  Symptom progression:  Improving  Had these symptoms before:  No  What makes it worse:  Hot and cold food and drink  What makes it better:  Not once it starts    She eats 2-3 servings of fruits and vegetables daily.She consumes 0 sweetened beverage(s) daily.She exercises with enough effort to increase her heart rate 30 to 60 minutes per day.  She exercises with enough effort to increase her heart rate 5 days per week. She is missing 3 dose(s) of medications per week.  She is not taking prescribed medications regularly due to remembering to take.    She has improved since UC. This morming very achy. Pain surges when she has contact with hot or cold. Some pain with chewing.     She was seen in the urgent care on 5/26/2022 for otalgia of the left ear and facial pain.  4 days of symptoms at that point.  Per urgent care notes: \" Generally less frequent today but not resolved.  No obvious dental " "etiology. No rash suggestive of shingles. Does sound much like nerve pain but difficult to say for certain.  May be trigeminal neuralgia, madibular branch.      Recommend pt keep a diary.  May try ice, heat, monitor for rash.      She has appt with her pcp next week and that is a good time to recheck.  Consider MRI given her hx of tobacco use though can not see any obvious mass or lesion.  No obivous parotid gland swelling or tenderness at this time.  \"       Review of Systems          Objective           Vitals:  No vitals were obtained today due to virtual visit.    Physical Exam   healthy, alert and no distress  PSYCH: Alert and oriented times 3; coherent speech, normal   rate and volume, able to articulate logical thoughts, able   to abstract reason, no tangential thoughts, no hallucinations   or delusions  Her affect is normal  RESP: No cough, no audible wheezing, able to talk in full sentences  Remainder of exam unable to be completed due to telephone visits              Phone call duration: 15 minutes  "

## 2022-06-13 DIAGNOSIS — G47.00 PERSISTENT INSOMNIA: ICD-10-CM

## 2022-06-15 NOTE — TELEPHONE ENCOUNTER
Routing refill request to provider for review/approval because:  Drug not on the [facility] refill protocol         12/2/21 was last Rx by Dr. Norman.  30 tabs with no refills.  5/31/22 was last virtual with Dr. Billings.  Ozzy, RN

## 2022-06-16 RX ORDER — ZOLPIDEM TARTRATE 5 MG/1
TABLET ORAL
Qty: 30 TABLET | Refills: 0 | Status: SHIPPED | OUTPATIENT
Start: 2022-06-16 | End: 2023-01-05

## 2022-11-20 ENCOUNTER — HEALTH MAINTENANCE LETTER (OUTPATIENT)
Age: 72
End: 2022-11-20

## 2023-01-05 ENCOUNTER — MYC REFILL (OUTPATIENT)
Dept: FAMILY MEDICINE | Facility: CLINIC | Age: 73
End: 2023-01-05

## 2023-01-05 DIAGNOSIS — G47.00 PERSISTENT INSOMNIA: ICD-10-CM

## 2023-01-05 RX ORDER — ZOLPIDEM TARTRATE 5 MG/1
5 TABLET ORAL
Qty: 30 TABLET | Refills: 0 | Status: SHIPPED | OUTPATIENT
Start: 2023-01-05 | End: 2023-07-17

## 2023-01-05 NOTE — TELEPHONE ENCOUNTER
Routing refill request to provider for review/approval because:  Drug not on the [facility] refill protocol     Last Written Prescription Date:  6/16/22  Last Fill Quantity: 30,  # refills: 0   Last office visit: 5/31/22 virtual with House, 12/2/21 with Emerson  Future Office Visit:   Next 5 appointments (look out 90 days)    Jan 24, 2023 10:00 AM  (Arrive by 9:40 AM)  Annual Wellness Visit with Talia Norman MD  [facility] ([facility] ) [address] [place] [address]  [phone]         BONG Kwan RN  [facility]

## 2023-01-24 ENCOUNTER — LAB (OUTPATIENT)
Dept: FAMILY MEDICINE | Facility: CLINIC | Age: 73
End: 2023-01-24

## 2023-01-24 ENCOUNTER — OFFICE VISIT (OUTPATIENT)
Dept: FAMILY MEDICINE | Facility: CLINIC | Age: 73
End: 2023-01-24
Payer: COMMERCIAL

## 2023-01-24 VITALS
SYSTOLIC BLOOD PRESSURE: 160 MMHG | WEIGHT: 154.08 LBS | HEIGHT: 61 IN | DIASTOLIC BLOOD PRESSURE: 84 MMHG | HEART RATE: 87 BPM | TEMPERATURE: 97.5 F | BODY MASS INDEX: 29.09 KG/M2 | RESPIRATION RATE: 16 BRPM | OXYGEN SATURATION: 99 %

## 2023-01-24 DIAGNOSIS — R03.0 ELEVATED BLOOD PRESSURE READING WITHOUT DIAGNOSIS OF HYPERTENSION: ICD-10-CM

## 2023-01-24 DIAGNOSIS — Z78.0 ASYMPTOMATIC POSTMENOPAUSAL STATUS: ICD-10-CM

## 2023-01-24 DIAGNOSIS — Z00.00 ENCOUNTER FOR MEDICARE ANNUAL WELLNESS EXAM: Primary | ICD-10-CM

## 2023-01-24 DIAGNOSIS — Z12.31 ENCOUNTER FOR SCREENING MAMMOGRAM FOR BREAST CANCER: ICD-10-CM

## 2023-01-24 DIAGNOSIS — Z12.11 SCREEN FOR COLON CANCER: ICD-10-CM

## 2023-01-24 DIAGNOSIS — Z87.891 PERSONAL HISTORY OF TOBACCO USE: ICD-10-CM

## 2023-01-24 LAB
ANION GAP SERPL CALCULATED.3IONS-SCNC: 13 MMOL/L (ref 7–15)
BUN SERPL-MCNC: 18.6 MG/DL (ref 8–23)
CALCIUM SERPL-MCNC: 9.4 MG/DL (ref 8.8–10.2)
CHLORIDE SERPL-SCNC: 104 MMOL/L (ref 98–107)
CREAT SERPL-MCNC: 0.54 MG/DL (ref 0.51–0.95)
CREAT UR-MCNC: 40.4 MG/DL
DEPRECATED HCO3 PLAS-SCNC: 24 MMOL/L (ref 22–29)
GFR SERPL CREATININE-BSD FRML MDRD: >90 ML/MIN/1.73M2
GLUCOSE SERPL-MCNC: 110 MG/DL (ref 70–99)
MICROALBUMIN UR-MCNC: <12 MG/L
MICROALBUMIN/CREAT UR: NORMAL MG/G{CREAT}
POTASSIUM SERPL-SCNC: 4.6 MMOL/L (ref 3.4–5.3)
SODIUM SERPL-SCNC: 141 MMOL/L (ref 136–145)

## 2023-01-24 PROCEDURE — 36415 COLL VENOUS BLD VENIPUNCTURE: CPT | Performed by: FAMILY MEDICINE

## 2023-01-24 PROCEDURE — G0438 PPPS, INITIAL VISIT: HCPCS | Performed by: FAMILY MEDICINE

## 2023-01-24 PROCEDURE — 82043 UR ALBUMIN QUANTITATIVE: CPT | Performed by: FAMILY MEDICINE

## 2023-01-24 PROCEDURE — 80048 BASIC METABOLIC PNL TOTAL CA: CPT | Performed by: FAMILY MEDICINE

## 2023-01-24 PROCEDURE — G0296 VISIT TO DETERM LDCT ELIG: HCPCS | Performed by: FAMILY MEDICINE

## 2023-01-24 PROCEDURE — 99213 OFFICE O/P EST LOW 20 MIN: CPT | Mod: 25 | Performed by: FAMILY MEDICINE

## 2023-01-24 PROCEDURE — 82570 ASSAY OF URINE CREATININE: CPT | Performed by: FAMILY MEDICINE

## 2023-01-24 ASSESSMENT — ENCOUNTER SYMPTOMS
DIZZINESS: 0
NAUSEA: 0
DIARRHEA: 0
FEVER: 0
EYE PAIN: 0
JOINT SWELLING: 0
DYSURIA: 0
HEADACHES: 0
HEMATOCHEZIA: 0
COUGH: 0
HEMATURIA: 0
WEAKNESS: 0
NERVOUS/ANXIOUS: 0
MYALGIAS: 0
ARTHRALGIAS: 0
PARESTHESIAS: 0
PALPITATIONS: 0
SORE THROAT: 0
FREQUENCY: 0
HEARTBURN: 1
SHORTNESS OF BREATH: 0
BREAST MASS: 0
CHILLS: 0
CONSTIPATION: 0
ABDOMINAL PAIN: 0

## 2023-01-24 ASSESSMENT — ACTIVITIES OF DAILY LIVING (ADL): CURRENT_FUNCTION: NO ASSISTANCE NEEDED

## 2023-01-24 NOTE — PROGRESS NOTES
"SUBJECTIVE:   Buffy is a 72 year old who presents for Preventive Visit.    Patient has been advised of split billing requirements and indicates understanding: Yes  Are you in the first 12 months of your Medicare coverage?  No    Healthy Habits:     In general, how would you rate your overall health?  Excellent    Frequency of exercise:  4-5 days/week    Duration of exercise:  45-60 minutes    Do you usually eat at least 4 servings of fruit and vegetables a day, include whole grains    & fiber and avoid regularly eating high fat or \"junk\" foods?  No    Taking medications regularly:  Yes    Medication side effects:  Not applicable    Ability to successfully perform activities of daily living:  No assistance needed    Home Safety:  No safety concerns identified    Hearing Impairment:  No hearing concerns    In the past 6 months, have you been bothered by leaking of urine?  No    In general, how would you rate your overall mental or emotional health?  Good      PHQ-2 Total Score: 0    Additional concerns today:  No    White Coat Hypertension Follow-up    Do you check your blood pressure regularly outside of the clinic? Yes She gets this checked when she donates blood and systolic is never > 135.    Are you following a low salt diet? Yes    Are your blood pressures ever more than 140 on the top number (systolic) OR more   than 90 on the bottom number (diastolic), for example 140/90? Yes  One time it was 142/80 on her home machine      Have you ever done Advance Care Planning? (For example, a Health Directive, POLST, or a discussion with a medical provider or your loved ones about your wishes): Patient states that she has a health care directive and her  prefers she keep it secret.  Discussed that we cannot honor her choices if we do not have them documented.  She states her kids have it.         Fall risk  Fallen 2 or more times in the past year?: No  Any fall with injury in the past year?: No    Cognitive " Screening   1) Repeat 3 items (Leader, Season, Table)    2) Clock draw: NORMAL  3) 3 item recall: Recalls 3 objects  Results: 3 items recalled: COGNITIVE IMPAIRMENT LESS LIKELY    Mini-CogTM Copyright S [employer]. Licensed by the author for use in [place] [employer]; reprinted with permission (veronique@[email or web address]). All rights reserved.        Reviewed and updated as needed this visit by clinical staff    Allergies  Meds  Problems             Reviewed and updated as needed this visit by Provider    Allergies  Meds  Problems            Social History     Tobacco Use     Smoking status: Light Smoker     Packs/day: 0.50     Years: 43.00     Pack years: 21.50     Types: Cigarettes     Start date: 1/1/1980     Smokeless tobacco: Never     Tobacco comments:     2 packs per week   Substance Use Topics     Alcohol use: Yes     Alcohol/week: 0.0 standard drinks     Comment: one or two cocktails/wine  four or five nights a week         Alcohol Use 1/24/2023   Prescreen: >3 drinks/day or >7 drinks/week? No   Prescreen: >3 drinks/day or >7 drinks/week? -       Current providers sharing in care for this patient include:   Patient Care Team:  Talia Norman MD as PCP - General (Family Medicine)  Jose Laura MD as MD (Dermatology)  Talia Norman MD as Assigned PCP    The following health maintenance items are reviewed in Epic and correct as of today:  Health Maintenance   Topic Date Due     ANNUAL REVIEW OF  ORDERS  Never done     Pneumococcal Vaccine: 65+ Years (1 - PCV) Never done     ZOSTER IMMUNIZATION (1 of 2) Never done     LUNG CANCER SCREENING  Never done     COLORECTAL CANCER SCREENING  11/10/2021     INFLUENZA VACCINE (1) 09/01/2022     MAMMO SCREENING  11/10/2022     MEDICARE ANNUAL WELLNESS VISIT  12/02/2022     NICOTINE/TOBACCO CESSATION COUNSELING Q 1 YR  01/24/2024     FALL RISK ASSESSMENT  01/24/2024     DEXA  08/24/2025     LIPID  01/22/2026     ADVANCE CARE PLANNING  01/24/2028     DTAP/TDAP/TD  "IMMUNIZATION (3 - Td or Tdap) 11/10/2030     PHQ-2 (once per calendar year)  Completed     COVID-19 Vaccine  Completed     IPV IMMUNIZATION  Aged Out     MENINGITIS IMMUNIZATION  Aged Out     HEPATITIS C SCREENING  Discontinued     BP Readings from Last 3 Encounters:   01/24/23 (!) 160/84   05/26/22 122/76   12/02/21 (!) 161/83    Wt Readings from Last 3 Encounters:   01/24/23 69.9 kg (154 lb 1.3 oz)   05/26/22 69.4 kg (153 lb)   12/02/21 70.8 kg (156 lb)              Review of Systems   Constitutional: Negative for chills and fever.   HENT: Negative for congestion, ear pain, hearing loss and sore throat.    Eyes: Negative for pain and visual disturbance.   Respiratory: Negative for cough and shortness of breath.    Cardiovascular: Negative for chest pain, palpitations and peripheral edema.   Gastrointestinal: Positive for heartburn. Negative for abdominal pain, constipation, diarrhea, hematochezia and nausea.   Breasts:  Negative for tenderness, breast mass and discharge.   Genitourinary: Negative for dysuria, frequency, genital sores, hematuria, pelvic pain, urgency, vaginal bleeding and vaginal discharge.   Musculoskeletal: Negative for arthralgias, joint swelling and myalgias.   Skin: Negative for rash.   Neurological: Negative for dizziness, weakness, headaches and paresthesias.   Psychiatric/Behavioral: Negative for mood changes. The patient is not nervous/anxious.        OBJECTIVE:   BP (!) 160/84 (BP Location: Left arm, Patient Position: Sitting, Cuff Size: Adult Regular)   Pulse 87   Temp 97.5  F (36.4  C) (Temporal)   Resp 16   Ht 1.549 m (5' 1\")   Wt 69.9 kg (154 lb 1.3 oz)   SpO2 99%   BMI 29.11 kg/m   Estimated body mass index is 29.11 kg/m  as calculated from the following:    Height as of this encounter: 1.549 m (5' 1\").    Weight as of this encounter: 69.9 kg (154 lb 1.3 oz).   Initial BP was 173/105  Physical Exam  GENERAL APPEARANCE: healthy, alert and no distress  EYES: Eyes grossly normal " to inspection, conjunctivae and sclerae normal  HENT: ear canals and TM's normal  NECK: no adenopathy, no asymmetry, masses, or scars and thyroid normal to palpation  RESP: lungs clear to auscultation - no rales, rhonchi or wheezes  CV: regular rate and rhythm, normal S1 S2, no S3 or S4, no murmur, click or rub, no peripheral edema   ABDOMEN: soft, nontender, no hepatosplenomegaly, no masses   MS: no musculoskeletal defects are noted and gait is age appropriate without ataxia  SKIN: no suspicious lesions or rashes  NEURO: Normal strength and tone, sensory exam grossly normal, mentation intact and speech normal  PSYCH: mentation appears normal and affect normal/bright         ASSESSMENT / PLAN:     Encounter for Medicare annual wellness exam  I recommended flu and PCV20 vaccines today which she declined.   - Adult Dermatology Referral    Screen for colon cancer  - [facility]([employer])    Encounter for screening mammogram for breast cancer  - MA SCREENING DIGITAL BILAT - Future  (s+30)    Asymptomatic postmenopausal status  - DX Hip/Pelvis/Spine    Personal history of tobacco use  Discussed she is a candidate for LDCT lung cancer screening and she would like to do that.  Discussed smoking cessation - see below.  - Prof fee: Shared Decision Making for Lung Cancer Screening  - CT Chest Lung Cancer Scrn Low Dose wo    Elevated blood pressure reading without diagnosis of hypertension  With history of severe white coat hypertension which she does not seem to get when she goes to give blood.  Discussed option of 24-hour BP monitoring to ensure this is truly white coat hypertension.  She is considering that.    - Basic metabolic panel  (Ca, Cl, CO2, Creat, Gluc, K, Na, BUN)  - Albumin Random Urine Quantitative with Creat Ratio       COUNSELING:  Reviewed preventive health counseling, as reflected in patient instructions      BMI:   Estimated body mass index is 29.11 kg/m  as calculated from the following:    Height as  "of this encounter: 1.549 m (5' 1\").    Weight as of this encounter: 69.9 kg (154 lb 1.3 oz).       She reports that she has been smoking cigarettes. She started smoking about 43 years ago. She has a 21.50 pack-year smoking history. She has never used smokeless tobacco. She currently smokes about 1/3 ppd which is down from her average of 1/2 ppd.  Nicotine/Tobacco Cessation Plan:   Information offered: Patient not interested at this time She indicates that her  will need to attempt cessation at the same time for her to be successful      Appropriate preventive services were discussed with this patient, including applicable screening as appropriate for cardiovascular disease, diabetes, osteopenia/osteoporosis, and glaucoma.  As appropriate for age/gender, discussed screening for colorectal cancer, prostate cancer, breast cancer, and cervical cancer. Checklist reviewing preventive services available has been given to the patient.    Reviewed patients plan of care and provided an AVS. The Basic Care Plan (routine screening as documented in Health Maintenance) for Alaina meets the Care Plan requirement. This Care Plan has been established and reviewed with the Patient.    Talia Norman MD  [facility]      Identified Health Risks:    The patient was counseled and encouraged to consider modifying their diet and eating habits. She was provided with information on recommended healthy diet options.      Lung Cancer Screening Shared Decision Making Visit     Deborah L Schoenholz, a 72 year old female, is eligible for lung cancer screening    History   Smoking Status     Light Smoker     Packs/day: 0.50     Years: 43.00     Types: Cigarettes     Start date: 1/1/1980   Smokeless Tobacco     Never       I have discussed with patient the risks and benefits of screening for lung cancer with low-dose CT.     The risks include:    radiation exposure: one low dose chest CT has as much ionizing " radiation as about 15 chest x-rays, or 6 months of background radiation living in [place]      false positives: most findings/nodules are NOT cancer, but some might still require additional diagnostic evaluation, including biopsy    over-diagnosis: some slow growing cancers that might never have been clinically significant will be detected and treated unnecessarily     The benefit of early detection of lung cancer is contingent upon adherence to annual screening or more frequent follow up if indicated.     Furthermore, to benefit from screening, Alaina must be willing and able to undergo diagnostic procedures, if indicated. Although no specific guide is available for determining severity of comorbidities, it is reasonable to withhold screening in patients who have greater mortality risk from other diseases.     We did discuss that the best way to prevent lung cancer is to not smoke.    Some patients may value a numeric estimation of lung cancer risk when evaluating if lung cancer screening is right for them, here is one calculator:    ShouldIScreen

## 2023-01-24 NOTE — RESULT ENCOUNTER NOTE
Jw Baer,  Your urine albumin (protein) level is normal.  Urine albumin is a test for microscopic proteins in the urine - a sign of early kidney disease from hypertension and/or diabetes. Keeping blood pressure and blood sugars controlled helps keep the kidneys healthy.  I also recommend staying hydrated and avoiding frequent use of over-the-counter NSAID medications (ibuprofen, naproxen, advil, motrin, aleve).     Let me know if you'd like to do a 24-hour blood pressure test.  Otherwise we can revisit that possibility next year.    Talia Norman MD

## 2023-01-24 NOTE — PATIENT INSTRUCTIONS
If you have MyChart:  1) I kindly request that you check your MyChart prior to all appointments with me and complete any assigned questionnaires ahead of time.    2) You may receive auto-released results from our system before I have the opportunity to review and comment.  Be assured I will review and comment on all of your results as soon as I can.      FYI:  My schedule has been booking out very far in advance (2 months).  I apologize for the lack of timely access.  If you need to be seen for a chronic condition or preventive (wellness) visit, please be sure to schedule that appointment 2-3 months in advance.  If you have a concern that you feel cannot wait until my next available appointment (such as a hospital follow-up or new symptom of concern) please ask to speak to one of the [place] nurses who may be able to access a sooner appointment.    I do ask that all patients who are taking chronic medications for conditions that I am managing schedule an in-person visit with me at least once a year.     To schedule any ordered imaging studies (including mammogram and/or DEXA scan) you can call [facility] at [phone].         Patient Education   Personalized Prevention Plan  You are due for the preventive services outlined below.  Your care team is available to assist you in scheduling these services.  If you have already completed any of these items, please share that information with your care team to update in your medical record.  Health Maintenance Due   Topic Date Due    ANNUAL REVIEW OF HM ORDERS  Never done    Pneumococcal Vaccine (1 - PCV) Never done    Zoster (Shingles) Vaccine (1 of 2) Never done    LUNG CANCER SCREENING  Never done    Colorectal Cancer Screening  11/10/2021    Flu Vaccine (1) 09/01/2022    Mammogram  11/10/2022    Annual Wellness Visit  12/02/2022       Understanding USDA MyPlate  The USDA has guidelines to help you make healthy food choices. These are called  MyPlate. MyPlate shows the food groups that make up healthy meals using the image of a place setting. Before you eat, think about the healthiest choices for what to put on your plate or in your cup or bowl. To learn more about building a healthy plate, visit www.choosemyplate.gov.    The food groups  Fruits. Any fruit or 100% fruit juice counts as part of the Fruit Group. Fruits may be fresh, canned, frozen, or dried, and may be whole, cut-up, or pureed. Make 1/2 of your plate fruits and vegetables.  Vegetables. Any vegetable or 100% vegetable juice counts as a member of the Vegetable Group. Vegetables may be fresh, frozen, canned, or dried. They can be served raw or cooked and may be whole, cut-up, or mashed. Make 1/2 of your plate fruits and vegetables.  Grains. All foods made from grains are part of the Grains Group. These include wheat, rice, oats, cornmeal, and barley. Grains are often used to make foods such as bread, pasta, oatmeal, cereal, tortillas, and grits. Grains should be no more than 1/4 of your plate. At least half of your grains should be whole grains.  Protein. This group includes meat, poultry, seafood, beans and peas, eggs, processed soy products (such as tofu), nuts (including nut butters), and seeds. Make protein choices no more than 1/4 of your plate. Meat and poultry choices should be lean or low fat.  Dairy. The Dairy Group includes all fluid milk products and foods made from milk that contain calcium, such as yogurt and cheese. (Foods that have little calcium, such as cream, butter, and cream cheese, are not part of this group.) Most dairy choices should be low-fat or fat-free.  Oils. Oils aren't a food group, but they do contain essential nutrients. However it's important to watch your intake of oils. These are fats that are liquid at room temperature. They include canola, corn, olive, soybean, vegetable, and sunflower oil. Foods that are mainly oil include mayonnaise, certain salad  dressings, and soft margarines. You likely already get your daily oil allowance from the foods you eat.  Things to limit  Eating healthy also means limiting these things in your diet:     Salt (sodium). Many processed foods have a lot of sodium. To keep sodium intake down, eat fresh vegetables, meats, poultry, and seafood when possible. Purchase low-sodium, reduced-sodium, or no-salt-added food products at the store. And don't add salt to your meals at home. Instead, season them with herbs and spices such as dill, oregano, cumin, and paprika. Or try adding flavor with lemon or lime zest and juice.  Saturated fat. Saturated fats are most often found in animal products such as beef, pork, and chicken. They are often solid at room temperature, such as butter. To reduce your saturated fat intake, choose leaner cuts of meat and poultry. And try healthier cooking methods such as grilling, broiling, roasting, or baking. For a simple lower-fat swap, use plain nonfat yogurt instead of mayonnaise when making potato salad or macaroni salad.  Added sugars. These are sugars added to foods. They are in foods such as ice cream, candy, soda, fruit drinks, sports drinks, energy drinks, cookies, pastries, jams, and syrups. Cut down on added sugars by sharing sweet treats with a family member or friend. You can also choose fruit for dessert, and drink water or other unsweetened beverages.     [employer] last reviewed this educational content on 6/1/2020 2000-2021 [employer]. All rights reserved. This information is not intended as a substitute for professional medical care. Always follow your healthcare professional's instructions.           Lung Cancer Screening   Frequently Asked Questions  If you are at high-risk for lung cancer, getting screened with low-dose computed tomography (LDCT) every year can help save your life. This handout offers answers to some of the most common questions about lung cancer screening.  If you have other questions, please call [phone] ([phone]).     What is it?  Lung cancer screening uses special X-ray technology to create an image of your lung tissue. The exam is quick and easy and takes less than 10 seconds. We don t give you any medicine or use any needles. You can eat before and after the exam. You don t need to change your clothes as long as the clothing on your chest doesn t contain metal. But, you do need to be able to hold your breath for at least 6 seconds during the exam.    What is the goal of lung cancer screening?  The goal of lung cancer screening is to save lives. Many times, lung cancer is not found until a person starts having physical symptoms. Lung cancer screening can help detect lung cancer in the earliest stages when it may be easier to treat.    Who should be screened for lung cancer?  We suggest lung cancer screening for anyone who is at high-risk for lung cancer. You are in the high-risk group if you:     are between the ages of 55 and 79, and   have smoked at least 1 pack of cigarettes a day for 20 or more years, and   still smoke or have quit within the past 15 years.    However, if you have a new cough or shortness of breath, you should talk to your doctor before being screened.    Why does it matter if I have symptoms?  Certain symptoms can be a sign that you have a condition in your lungs that should be checked and treated by your doctor. These symptoms include fever, chest pain, a new or changing cough, shortness of breath that you have never felt before, coughing up blood or unexplained weight loss. Having any of these symptoms can greatly affect the results of lung cancer screening.       Should all smokers get an LDCT lung cancer screening exam?  It depends. Lung cancer screening is for a very specific group of men and women who have a history of heavy smoking over a long period of time (see  Who should be screened for lung cancer  above).  I am in  the high-risk group, but have been diagnosed with cancer in the past. Is LDCT lung cancer screening right for me?  In some cases, you should not have LDCT lung screening, such as when your doctor is already following your cancer with CT scan studies. Your doctor will help you decide if LDCT lung screening is right for you.  Do I need to have a screening exam every year?  Yes. If you are in the high-risk group described earlier, you should get an LDCT lung cancer screening exam every year until you are 79, or are no longer willing or able to undergo screening and possible procedures to diagnose and treat lung cancer.  How effective is LDCT at preventing death from lung cancer?  Studies have shown that LDCT lung cancer screening can lower the risk of death from lung cancer by 20 percent in people who are at high-risk.  What are the risks?  There are some risks and limitations of LDCT lung cancer screening. We want to make sure you understand the risks and benefits, so please let us know if you have any questions. Your doctor may want to talk with you more about these risks.   Radiation exposure: As with any exam that uses radiation, there is a very small increased risk of cancer. The amount of radiation in LDCT is small--about the same amount a person would get from a mammogram. Your doctor orders the exam when he or she feels the potential benefits outweigh the risks.   False negatives: No test is perfect, including LDCT. It is possible that you may have a medical condition, including lung cancer, that is not found during your exam. This is called a false negative result.   False positives and more testing: LDCT very often finds something in the lung that could be cancer, but in fact is not. This is called a false positive result. False positive tests often cause anxiety. To make sure these findings are not cancer, you may need to have more tests. These tests will be done only if you give us permission. Sometimes  patients need a treatment that can have side effects, such as a biopsy. For more information on false positives, see  What can I expect from the results?    Findings not related to lung cancer: Your LDCT exam also takes pictures of areas of your body next to your lungs. In a very small number of cases, the CT scan will show an abnormal finding in one of these areas, such as your kidneys, adrenal glands, liver or thyroid. This finding may not be serious, but you may need more tests. Your doctor can help you decide what other tests you may need, if any.  What can I expect from the results?  About 1 out of 4 LDCT exams will find something that may need more tests. Most of the time, these findings are lung nodules. Lung nodules are very small collections of tissue in the lung. These nodules are very common, and the vast majority--more than 97 percent--are not cancer (benign). Most are normal lymph nodes or small areas of scarring from past infections.  But, if a small lung nodule is found to be cancer, the cancer can be cured more than 90 percent of the time. To know if the nodule is cancer, we may need to get more images before your next yearly screening exam. If the nodule has suspicious features (for example, it is large, has an odd shape or grows over time), we will refer you to a specialist for further testing.  Will my doctor also get the results?  Yes. Your doctor will get a copy of your results.  Is it okay to keep smoking now that there s a cancer screening exam?  No. Tobacco is one of the strongest cancer-causing agents. It causes not only lung cancer, but other cancers and cardiovascular (heart) diseases as well. The damage caused by smoking builds over time. This means that the longer you smoke, the higher your risk of disease. While it is never too late to quit, the sooner you quit, the better.  Where can I find help to quit smoking?  The best way to prevent lung cancer is to stop smoking. If you have  already quit smoking, congratulations and keep it up! For help on quitting smoking, please call [facility] at 4-185-QUIT-NOW (1-862.267.1387) or the American [employer] Society at 1-166.318.7049 to find local resources near you.  One-on-one health coaching:  If you d prefer to work individually with a health care provider on tobacco cessation, we offer:     Medication Therapy Management:  Our specially trained pharmacists work closely with you and your doctor to help you quit smoking.  Call [phone] or [phone] (toll free).

## 2023-01-24 NOTE — RESULT ENCOUNTER NOTE
Jw Baer,  Your basic metabolic panel results (blood salts, blood sugar, and kidney function) are all normal.  (The blood sugar reference range is only applicable to fasting specimens.)    Talia Norman MD

## 2023-02-04 LAB — NONINV COLON CA DNA+OCC BLD SCRN STL QL: NEGATIVE

## 2023-02-04 NOTE — RESULT ENCOUNTER NOTE
Jw Baer,  Thanks for completing the cologuard stool test for colon cancer screening!  Your stool test is negative for microscopic (hidden) blood.  This test should be repeated every 3 years.     Talia Norman MD

## 2023-02-14 ENCOUNTER — ANCILLARY PROCEDURE (OUTPATIENT)
Dept: CT IMAGING | Facility: CLINIC | Age: 73
End: 2023-02-14
Attending: FAMILY MEDICINE
Payer: COMMERCIAL

## 2023-02-14 ENCOUNTER — ANCILLARY PROCEDURE (OUTPATIENT)
Dept: BONE DENSITY | Facility: CLINIC | Age: 73
End: 2023-02-14
Attending: FAMILY MEDICINE
Payer: COMMERCIAL

## 2023-02-14 ENCOUNTER — ANCILLARY PROCEDURE (OUTPATIENT)
Dept: MAMMOGRAPHY | Facility: CLINIC | Age: 73
End: 2023-02-14
Attending: FAMILY MEDICINE
Payer: COMMERCIAL

## 2023-02-14 DIAGNOSIS — Z78.0 ASYMPTOMATIC POSTMENOPAUSAL STATUS: ICD-10-CM

## 2023-02-14 DIAGNOSIS — Z12.31 ENCOUNTER FOR SCREENING MAMMOGRAM FOR BREAST CANCER: ICD-10-CM

## 2023-02-14 DIAGNOSIS — Z87.891 PERSONAL HISTORY OF TOBACCO USE: ICD-10-CM

## 2023-02-14 PROCEDURE — 71271 CT THORAX LUNG CANCER SCR C-: CPT | Mod: GC | Performed by: RADIOLOGY

## 2023-02-14 PROCEDURE — 77067 SCR MAMMO BI INCL CAD: CPT | Performed by: RADIOLOGY

## 2023-02-14 PROCEDURE — 77080 DXA BONE DENSITY AXIAL: CPT | Performed by: INTERNAL MEDICINE

## 2023-02-14 NOTE — RESULT ENCOUNTER NOTE
Jw Baer,  Your low-dose chest CT study shows a few small scattered lung nodules and some emphysema but no findings concerning for lung cancer.      The recommendation is to repeat the low-dose chest CT in one year.    The best way to prevent lung cancer is to quit smoking so do let me know if you'd like any prescriptions to help with that (Zyban, Chantix, nicotine gum, patches, or lozenges).  We might also consider pulmonary function testing to clarify if you have chronic obstructive pulmonary disease.  Talia Norman MD

## 2023-02-15 NOTE — RESULT ENCOUNTER NOTE
Jw Baer,  I am happy to see that you completed your mammogram and that your result is normal!  Talia Norman MD

## 2023-02-19 PROBLEM — M85.852 OSTEOPENIA OF BOTH HIPS: Status: ACTIVE | Noted: 2023-02-19

## 2023-02-19 PROBLEM — M85.851 OSTEOPENIA OF BOTH HIPS: Status: ACTIVE | Noted: 2023-02-19

## 2023-02-19 NOTE — RESULT ENCOUNTER NOTE
Jw Baer,  The result of your recent DEXA scan is abnormal.  The density of your bones is thinner than expected. This is called low bone density (osteopenia) when there is mild to moderate thinning and osteoporosis when there  is moderate to severe thinning. This may put you at risk for a fracture.    You have low bone density (osteopenia).    Compared to your previous DEXA scan, these results are worse which is not surprising as it has been 12 years since your last DEXA (bone density) scan.    By current guidelines we consider initiating medication treatment for bone density if patients have osteoporosis or if their estimated risk for a hip fracture in the next 10 years is greater than 3%.  You do not have osteoporosis but your 10-year estimated risk for a hip fracture is 3.2% which does make you eligible for medication therapy.  If you'd like to discuss those options please schedule a virtual visit (a scheduled video or telephone visit) with me to discuss that further.  Either way we should plan on repeating your DEXA scan in 2 years.    To help prevent further loss of bone, the following is recommended:    Take in adequate amounts of Calcium and Vitamin D. These are the building blocks for bones. Most women will need 1500mg of Calcium and 1000 international units of Vitamin D daily.  It is best to get your calcium through your diet; if you get 3 servings of dairy products daily you should not need calcium supplements.      Exercise daily to keep your bones strong. Thirty minutes of moderate walking or other aerobic activity is recommended.    If you are a smoker, make an appointment to discuss smoking cessation.    If you have any questions or concerns, please schedule an appointment with me to further discuss these results.     Talia Norman MD

## 2023-03-16 ENCOUNTER — TELEPHONE (OUTPATIENT)
Dept: DERMATOLOGY | Facility: CLINIC | Age: 73
End: 2023-03-16
Payer: COMMERCIAL

## 2023-03-30 ENCOUNTER — MYC MEDICAL ADVICE (OUTPATIENT)
Dept: FAMILY MEDICINE | Facility: CLINIC | Age: 73
End: 2023-03-30
Payer: COMMERCIAL

## 2023-03-30 DIAGNOSIS — I10 HYPERTENSION GOAL BP (BLOOD PRESSURE) < 130/80: Primary | ICD-10-CM

## 2023-04-03 PROBLEM — I10 HYPERTENSION GOAL BP (BLOOD PRESSURE) < 130/80: Status: ACTIVE | Noted: 2023-04-03

## 2023-04-03 RX ORDER — AMLODIPINE AND BENAZEPRIL HYDROCHLORIDE 5; 10 MG/1; MG/1
1 CAPSULE ORAL DAILY
Qty: 30 CAPSULE | Refills: 0 | Status: SHIPPED | OUTPATIENT
Start: 2023-04-03 | End: 2023-05-04

## 2023-04-03 NOTE — TELEPHONE ENCOUNTER
Dr Norman-  Patient is ready to concede that she needs to be on medication for high blood pressure.  Please see My Chart message.  Gayatri Madrigal RN  [facility]

## 2023-07-15 DIAGNOSIS — G47.00 PERSISTENT INSOMNIA: ICD-10-CM

## 2023-07-17 RX ORDER — ZOLPIDEM TARTRATE 5 MG/1
TABLET ORAL
Qty: 30 TABLET | Refills: 0 | Status: SHIPPED | OUTPATIENT
Start: 2023-07-17 | End: 2024-01-22

## 2023-08-30 ENCOUNTER — OFFICE VISIT (OUTPATIENT)
Dept: DERMATOLOGY | Facility: CLINIC | Age: 73
End: 2023-08-30
Attending: FAMILY MEDICINE
Payer: COMMERCIAL

## 2023-08-30 DIAGNOSIS — D22.9 MULTIPLE PIGMENTED NEVI: ICD-10-CM

## 2023-08-30 DIAGNOSIS — L82.1 SEBORRHEIC KERATOSES: ICD-10-CM

## 2023-08-30 DIAGNOSIS — L81.4 SOLAR LENTIGINOSIS: ICD-10-CM

## 2023-08-30 DIAGNOSIS — Z12.83 SKIN CANCER SCREENING: Primary | ICD-10-CM

## 2023-08-30 DIAGNOSIS — Z00.00 ENCOUNTER FOR MEDICARE ANNUAL WELLNESS EXAM: ICD-10-CM

## 2023-08-30 DIAGNOSIS — D18.01 CHERRY ANGIOMA: ICD-10-CM

## 2023-08-30 PROCEDURE — 99203 OFFICE O/P NEW LOW 30 MIN: CPT | Performed by: PHYSICIAN ASSISTANT

## 2023-08-30 ASSESSMENT — PAIN SCALES - GENERAL: PAINLEVEL: NO PAIN (0)

## 2023-08-30 NOTE — LETTER
8/30/2023       RE: Deborah L Schoenhlauranick  [address]  [facility] [address]     Dear Colleague,    Thank you for referring your patient, Deborah L Schoenholz, to the [facility] [place] at [facility]. Please see a copy of my visit note below.    [facility] Dermatology Note  Encounter Date: Aug 30, 2023  Office Visit     Dermatology Problem List:  1. 1. History NMSC  - BCC, left central cheek s/p Mohs 11/14/14  2 Skin cancer screening 8/30/23    ____________________________________________    Assessment & Plan:     # Skin cancer screening with multiple benign nevi, solar lentigines    - ABCDEs: Counseled ABCDEs of melanoma: Asymmetry, Border (irregularity), Color (not uniform, changes in color), Diameter (greater than 6 mm which is about the size of a pencil eraser), and Evolving (any changes in preexisting moles).  - Sun protection: Counseled SPF30+ sunscreen, UPF clothing, sun avoidance, tanning bed avoidance.    # Cherry angiomas and seborrheic keratoses (including lesion of concern on the back),  Benign, reassurance given.      #History of basal cell carcinomas, left central cheek  Recommend continuing annual skin exams and discussed attempting more sun protection.    #Tinea pedis with onychomycosis  Recommend over-the-counter antifungal daily until resolved.      Procedures Performed:   None      Follow-up: 1 year(s) in-person, or earlier for new or changing lesions    Staff:     All risks, benefits and alternatives were discussed with patient.  Patient is in agreement and understands the assessment and plan.  All questions were answered.  Sun Screen Education was given.   Return to Clinic annnually or sooner as needed.   Maya Ramírez PA-C    ____________________________________________    CC: Skin Check (FBSE.  Spot of concern on the back.  )    HPI:  Ms. Deborah L Schoenholz is a(n) 73 year old female who  presents today as a new patient for a skin check. Last seen by Dr Laura in 2016  for hand dermatitis.     Today, she was referred from Dr. Talia Norman due to her history of basal cell carcinoma.  Buffy grew up without using sunscreen and burned frequently.  She reports she is outside most of the summer in the morning hours until 11 AM in the sun.  She does a lot of gardening.  She does not wear sunscreen or wear a hat or sunglasses.    She wonders about a growth on her right lateral back.  It is not bothersome and she believes it is a keratosis but cannot see it well.    She denies any spots that are painful, bleeding, itchy or changing.     Patient is otherwise feeling well, without additional skin concerns.     Labs Reviewed:  N/A    Physical Exam:  Vitals: There were no vitals taken for this visit.  SKIN: Full skin, which includes the head/face, both arms, chest, back, abdomen,both legs, genitalia and/or groin buttocks, digits and/or nails, was examined.  There are pink scaly plaques to bilateral feet in a moccasin like distribution and interdigital scale. Toenail plates are brittle and dystrophic.    - There are dome shaped bright red papules on the trunk.   Multiple regular brown pigmented macules and papules are identified on the trunk and extremities.   There are fine lines and dyspigmentation on sun exposed areas of the face and chest.  There is no erythema, telangectasias, nodularity, or pigmentation on the left cheek.  Scattered brown macules on sun exposed areas.  There are waxy stuck on tan to brown papules on the face, trunk and extremities..     - No other lesions of concern on areas examined.     Medications:  Current Outpatient Medications   Medication    amLODIPine-benazepril (LOTREL) 5-10 MG capsule    ascorbic acid (VITAMIN C) 500 MG tablet    melatonin 5 MG tablet    zolpidem (AMBIEN) 5 MG tablet    B Complex Vitamins (VITAMIN B COMPLEX PO)    Calcium Carbonate-Vit D-Min (CALCIUM 600 +  MINERALS PO)     No current facility-administered medications for this visit.      Past Medical History:   Patient Active Problem List   Diagnosis    Carpal tunnel syndrome    Chronic insomnia    Tobacco abuse    Elevated blood pressure reading without diagnosis of hypertension    Osteopenia of both hips    Hypertension goal BP (blood pressure) < 130/80     Past Medical History:   Diagnosis Date    Basal cell carcinoma     Breast disorder 2005    benign small mass    Carpal tunnel syndrome     Hyperlipidemia LDL goal < 130     Insomnia, unspecified     Nicotine addiction     Problems with sexual function        CC Talia Norman MD  [address] [facility] [address]  [place] [address] on close of this encounter.

## 2023-08-30 NOTE — PATIENT INSTRUCTIONS

## 2023-08-30 NOTE — NURSING NOTE
Dermatology Rooming Note    Deborah L Schoenholz's goals for this visit include:   Chief Complaint   Patient presents with    Skin Check     FBSE.  Spot of concern on the back.       Caroline Yo, CMA

## 2023-08-30 NOTE — PROGRESS NOTES
[facility] Dermatology Note  Encounter Date: Aug 30, 2023  Office Visit     Dermatology Problem List:  1. 1. History NMSC  - BCC, left central cheek s/p Mohs 11/14/14  2 Skin cancer screening 8/30/23    ____________________________________________    Assessment & Plan:     # Skin cancer screening with multiple benign nevi, solar lentigines    - ABCDEs: Counseled ABCDEs of melanoma: Asymmetry, Border (irregularity), Color (not uniform, changes in color), Diameter (greater than 6 mm which is about the size of a pencil eraser), and Evolving (any changes in preexisting moles).  - Sun protection: Counseled SPF30+ sunscreen, UPF clothing, sun avoidance, tanning bed avoidance.    # Cherry angiomas and seborrheic keratoses (including lesion of concern on the back),  Benign, reassurance given.      #History of basal cell carcinomas, left central cheek  Recommend continuing annual skin exams and discussed attempting more sun protection.    #Tinea pedis with onychomycosis  Recommend over-the-counter antifungal daily until resolved.      Procedures Performed:   None      Follow-up: 1 year(s) in-person, or earlier for new or changing lesions    Staff:     All risks, benefits and alternatives were discussed with patient.  Patient is in agreement and understands the assessment and plan.  All questions were answered.  Sun Screen Education was given.   Return to Clinic annnually or sooner as needed.   Maya Ramírez PA-C    ____________________________________________    CC: Skin Check (FBSE.  Spot of concern on the back.  )    HPI:  Ms. Deborah L Schoenholz is a(n) 73 year old female who presents today as a new patient for a skin check. Last seen by Dr Laura in 2016  for hand dermatitis.     Today, she was referred from Dr. Talia Norman due to her history of basal cell carcinoma.  Buffy grew up without using sunscreen and burned frequently.  She reports she is outside most of the summer in the morning  hours until 11 AM in the sun.  She does a lot of gardening.  She does not wear sunscreen or wear a hat or sunglasses.    She wonders about a growth on her right lateral back.  It is not bothersome and she believes it is a keratosis but cannot see it well.    She denies any spots that are painful, bleeding, itchy or changing.     Patient is otherwise feeling well, without additional skin concerns.     Labs Reviewed:  N/A    Physical Exam:  Vitals: There were no vitals taken for this visit.  SKIN: Full skin, which includes the head/face, both arms, chest, back, abdomen,both legs, genitalia and/or groin buttocks, digits and/or nails, was examined.  There are pink scaly plaques to bilateral feet in a moccasin like distribution and interdigital scale. Toenail plates are brittle and dystrophic.    - There are dome shaped bright red papules on the trunk.   Multiple regular brown pigmented macules and papules are identified on the trunk and extremities.   There are fine lines and dyspigmentation on sun exposed areas of the face and chest.  There is no erythema, telangectasias, nodularity, or pigmentation on the left cheek.  Scattered brown macules on sun exposed areas.  There are waxy stuck on tan to brown papules on the face, trunk and extremities..     - No other lesions of concern on areas examined.     Medications:  Current Outpatient Medications   Medication    amLODIPine-benazepril (LOTREL) 5-10 MG capsule    ascorbic acid (VITAMIN C) 500 MG tablet    melatonin 5 MG tablet    zolpidem (AMBIEN) 5 MG tablet    B Complex Vitamins (VITAMIN B COMPLEX PO)    Calcium Carbonate-Vit D-Min (CALCIUM 600 + MINERALS PO)     No current facility-administered medications for this visit.      Past Medical History:   Patient Active Problem List   Diagnosis    Carpal tunnel syndrome    Chronic insomnia    Tobacco abuse    Elevated blood pressure reading without diagnosis of hypertension    Osteopenia of both hips    Hypertension goal BP  (blood pressure) < 130/80     Past Medical History:   Diagnosis Date    Basal cell carcinoma     Breast disorder 2005    benign small mass    Carpal tunnel syndrome     Hyperlipidemia LDL goal < 130     Insomnia, unspecified     Nicotine addiction     Problems with sexual function        CC Talia Norman MD  [address]  [place] [address] on close of this encounter.

## 2024-01-04 ENCOUNTER — PATIENT OUTREACH (OUTPATIENT)
Dept: CARE COORDINATION | Facility: CLINIC | Age: 74
End: 2024-01-04
Payer: COMMERCIAL

## 2024-01-18 ENCOUNTER — PATIENT OUTREACH (OUTPATIENT)
Dept: CARE COORDINATION | Facility: CLINIC | Age: 74
End: 2024-01-18
Payer: COMMERCIAL

## 2024-01-22 ENCOUNTER — MYC MEDICAL ADVICE (OUTPATIENT)
Dept: FAMILY MEDICINE | Facility: CLINIC | Age: 74
End: 2024-01-22
Payer: COMMERCIAL

## 2024-01-22 DIAGNOSIS — G47.00 PERSISTENT INSOMNIA: ICD-10-CM

## 2024-01-22 DIAGNOSIS — I10 HYPERTENSION GOAL BP (BLOOD PRESSURE) < 130/80: Primary | ICD-10-CM

## 2024-01-22 RX ORDER — ZOLPIDEM TARTRATE 5 MG/1
TABLET ORAL
Qty: 30 TABLET | Refills: 0 | Status: SHIPPED | OUTPATIENT
Start: 2024-01-22 | End: 2024-08-20

## 2024-01-24 NOTE — TELEPHONE ENCOUNTER
Dr. Norman: please see pt message, visit is scheduled 2/15/24.    Jw Melgar. I have seven days more of the amlodipine I have been taking, but I contact you because I would like to try something different. I have had a small smoker's cough all the years I have been smoking, but since I started these that seems worse - not terrible but now I get more like coughing sessions than the occasional single cough with which I am familiar. I believe you said there were other choices available? Would you write me a scrip for one of those? I do have an appointment set up with you for next month but my supply will run out before then, hence the message.  Buffy SILVERMAN RN  [facility]

## 2024-01-25 RX ORDER — AMLODIPINE AND VALSARTAN 5; 160 MG/1; MG/1
1 TABLET ORAL DAILY
Qty: 90 TABLET | Refills: 0 | Status: SHIPPED | OUTPATIENT
Start: 2024-01-25 | End: 2024-02-15

## 2024-01-25 NOTE — TELEPHONE ENCOUNTER
It is the benazepril (not the amlodipine) component that can worsen cough.  I will switch the amlodipine-benazepril to amlodipine-valsartan which should not cause cough.  It may take a couple weeks after she makes the switch to notice improvement of the cough.  Please explain this change to patient.    She is scheduled to see me in a few weeks so that is perfect timing to follow-up on both her BP and her cough.    Talia Norman MD  [facility]

## 2024-02-10 SDOH — HEALTH STABILITY: PHYSICAL HEALTH: ON AVERAGE, HOW MANY MINUTES DO YOU ENGAGE IN EXERCISE AT THIS LEVEL?: 40 MIN

## 2024-02-10 SDOH — HEALTH STABILITY: PHYSICAL HEALTH: ON AVERAGE, HOW MANY DAYS PER WEEK DO YOU ENGAGE IN MODERATE TO STRENUOUS EXERCISE (LIKE A BRISK WALK)?: 5 DAYS

## 2024-02-10 ASSESSMENT — SOCIAL DETERMINANTS OF HEALTH (SDOH): HOW OFTEN DO YOU GET TOGETHER WITH FRIENDS OR RELATIVES?: TWICE A WEEK

## 2024-02-15 ENCOUNTER — OFFICE VISIT (OUTPATIENT)
Dept: FAMILY MEDICINE | Facility: CLINIC | Age: 74
End: 2024-02-15
Attending: FAMILY MEDICINE
Payer: COMMERCIAL

## 2024-02-15 VITALS
BODY MASS INDEX: 29.11 KG/M2 | OXYGEN SATURATION: 96 % | DIASTOLIC BLOOD PRESSURE: 60 MMHG | HEIGHT: 61 IN | TEMPERATURE: 98.1 F | SYSTOLIC BLOOD PRESSURE: 102 MMHG | WEIGHT: 154.2 LBS | HEART RATE: 85 BPM | RESPIRATION RATE: 20 BRPM

## 2024-02-15 DIAGNOSIS — M85.852 OSTEOPENIA OF BOTH HIPS: ICD-10-CM

## 2024-02-15 DIAGNOSIS — M85.851 OSTEOPENIA OF BOTH HIPS: ICD-10-CM

## 2024-02-15 DIAGNOSIS — Z23 NEED FOR VACCINATION: ICD-10-CM

## 2024-02-15 DIAGNOSIS — I10 HYPERTENSION GOAL BP (BLOOD PRESSURE) < 130/80: ICD-10-CM

## 2024-02-15 DIAGNOSIS — Z00.00 ENCOUNTER FOR MEDICARE ANNUAL WELLNESS EXAM: Primary | ICD-10-CM

## 2024-02-15 DIAGNOSIS — Z87.891 PERSONAL HISTORY OF TOBACCO USE: ICD-10-CM

## 2024-02-15 LAB
ANION GAP SERPL CALCULATED.3IONS-SCNC: 10 MMOL/L (ref 7–15)
BUN SERPL-MCNC: 24.2 MG/DL (ref 8–23)
CALCIUM SERPL-MCNC: 9.2 MG/DL (ref 8.8–10.2)
CHLORIDE SERPL-SCNC: 105 MMOL/L (ref 98–107)
CREAT SERPL-MCNC: 0.65 MG/DL (ref 0.51–0.95)
CREAT UR-MCNC: 67.5 MG/DL
DEPRECATED HCO3 PLAS-SCNC: 27 MMOL/L (ref 22–29)
EGFRCR SERPLBLD CKD-EPI 2021: >90 ML/MIN/1.73M2
GLUCOSE SERPL-MCNC: 79 MG/DL (ref 70–99)
MICROALBUMIN UR-MCNC: <12 MG/L
MICROALBUMIN/CREAT UR: NORMAL MG/G{CREAT}
POTASSIUM SERPL-SCNC: 4.2 MMOL/L (ref 3.4–5.3)
SODIUM SERPL-SCNC: 142 MMOL/L (ref 135–145)
VIT D+METAB SERPL-MCNC: 38 NG/ML (ref 20–50)

## 2024-02-15 PROCEDURE — 82570 ASSAY OF URINE CREATININE: CPT | Performed by: FAMILY MEDICINE

## 2024-02-15 PROCEDURE — 80048 BASIC METABOLIC PNL TOTAL CA: CPT | Performed by: FAMILY MEDICINE

## 2024-02-15 PROCEDURE — 36415 COLL VENOUS BLD VENIPUNCTURE: CPT | Performed by: FAMILY MEDICINE

## 2024-02-15 PROCEDURE — 82043 UR ALBUMIN QUANTITATIVE: CPT | Performed by: FAMILY MEDICINE

## 2024-02-15 PROCEDURE — G0439 PPPS, SUBSEQ VISIT: HCPCS | Performed by: FAMILY MEDICINE

## 2024-02-15 PROCEDURE — G0296 VISIT TO DETERM LDCT ELIG: HCPCS | Performed by: FAMILY MEDICINE

## 2024-02-15 PROCEDURE — 82306 VITAMIN D 25 HYDROXY: CPT | Performed by: FAMILY MEDICINE

## 2024-02-15 PROCEDURE — 99214 OFFICE O/P EST MOD 30 MIN: CPT | Mod: 25 | Performed by: FAMILY MEDICINE

## 2024-02-15 RX ORDER — ZOLPIDEM TARTRATE 5 MG/1
TABLET ORAL
Qty: 30 TABLET | Refills: 0 | Status: CANCELLED | OUTPATIENT
Start: 2024-02-15

## 2024-02-15 RX ORDER — AMLODIPINE AND VALSARTAN 5; 160 MG/1; MG/1
0.5 TABLET ORAL DAILY
Start: 2024-02-15 | End: 2024-06-29

## 2024-02-15 RX ORDER — MULTIVIT WITH MINERALS/LUTEIN
TABLET ORAL
COMMUNITY
Start: 2023-06-01

## 2024-02-15 RX ORDER — RESPIRATORY SYNCYTIAL VIRUS VACCINE 120MCG/0.5
0.5 KIT INTRAMUSCULAR ONCE
Qty: 1 EACH | Refills: 0 | Status: CANCELLED | OUTPATIENT
Start: 2024-02-15 | End: 2024-02-15

## 2024-02-15 ASSESSMENT — PAIN SCALES - GENERAL: PAINLEVEL: MILD PAIN (2)

## 2024-02-15 NOTE — PROGRESS NOTES
Preventive Care Visit  [facility]  Talia Norman MD, Family Medicine  Feb 15, 2024    Assessment & Plan     Encounter for Medicare annual wellness exam  Reviewed/updated Health Maintenance.  She does not desire mammography more frequently than every 2 years.    Hypertension goal BP (blood pressure) < 130/80  Her BP is a bit soft today after switching from amlodipine-benazepril to amlodipine-valsartan.  We'll have her try cutting her amlodipine-valsartan tablets in half for a daily dose of 2.5 mg amlodipine and 80 mg valsartan.  I've asked her to monitor her BP at home and send me an update in a few weeks with her home BP readings.  She is agreeable to this plan.    - Basic metabolic panel  (Ca, Cl, CO2, Creat, Gluc, K, Na, BUN)  - Albumin Random Urine Quantitative with Creat Ratio  - amLODIPine-valsartan (EXFORGE) 5-160 MG tablet    Osteopenia of both hips  She is a candidate for treatment per FRAX score.  She does not want treatment.  We'll continue to monitor bone density for worsening.    - Vitamin D Deficiency    Personal history of tobacco use  She had baseline LDCT a year ago.  She continues to smoke and does not desire cessation at this time.  - Prof fee: Shared Decision Making for Lung Cancer Screening  - CT Chest Lung Cancer Scrn Low Dose wo    Need for vaccination  I recommended PCV-20 and Flu vaccines in clinic today which she declined.  I discussed that her tobacco does place her at higher risk for respiratory complications of both Strep pneumo and influenza.      Nicotine/Tobacco Cessation  She reports that she has been smoking cigarettes. She started smoking about 44 years ago. She has a 22 pack-year smoking history. She has never used smokeless tobacco.  Nicotine/Tobacco Cessation Plan  Information offered: Patient not interested at this time      BMI  Estimated body mass index is 29.3 kg/m  as calculated from the following:    Height as of this encounter: 1.545 m (5'  "0.83\").    Weight as of this encounter: 69.9 kg (154 lb 3.2 oz).       Counseling  Appropriate preventive services were discussed with this patient, including applicable screening as appropriate for fall prevention, nutrition, physical activity, Tobacco-use cessation, weight loss and cognition.  Checklist reviewing preventive services available has been given to the patient.  Reviewed patient's diet, addressing concerns and/or questions.   The patient reports drinking more than one alcoholic drink per day and sometimes engages in binge or excessive drinking. The patient was counseled and given information about possible harmful effects of excessive alcohol intake as well as where to get help for alcohol problems.       See Patient Instructions    Subjective   Buffy is a 73 year old, presenting for the following:  Medicare Visit and Hypertension        2/15/2024     1:13 PM   Additional Questions   Roomed by Melissa   Accompanied by alone         2/15/2024     1:13 PM   Patient Reported Additional Medications   Patient reports taking the following new medications none         Health Care Directive  Patient does not have a Health Care Directive or Living Will: Discussed advance care planning with patient; information given to patient to review.    HPI    HTN F/U - we switched amlodipine-benazepril 5/10 to amlodipine-valsartan 5/160.  She's been on that for 2 weeks.  No dizziness.  Does not check home BP's.  Her cough resolved.         Wt Readings from Last 10 Encounters:   02/15/24 69.9 kg (154 lb 3.2 oz)   01/24/23 69.9 kg (154 lb 1.3 oz)   05/26/22 69.4 kg (153 lb)   12/02/21 70.8 kg (156 lb)   11/04/20 71.7 kg (158 lb)   11/01/18 69.9 kg (154 lb 1.6 oz)   09/10/18 69.3 kg (152 lb 12.8 oz)   08/21/18 68.9 kg (151 lb 12.8 oz)   09/05/17 74.8 kg (165 lb)   05/16/17 75.8 kg (167 lb)                2/10/2024   General Health   How would you rate your overall physical health? Excellent   Feel stress (tense, anxious, or " unable to sleep) Not at all         2/10/2024   Nutrition   Diet: Regular (no restrictions)         2/10/2024   Exercise   Days per week of moderate/strenous exercise 5 days   Average minutes spent exercising at this level 40 min         2/10/2024   Social Factors   Frequency of gathering with friends or relatives Twice a week   Worry food won't last until get money to buy more No   Food not last or not have enough money for food? No   Do you have housing?  Yes   Are you worried about losing your housing? No   Lack of transportation? No   Unable to get utilities (heat,electricity)? No         2/15/2024   Fall Risk   Fallen 2 or more times in the past year? No   Trouble with walking or balance? No          2/10/2024   Activities of Daily Living- Home Safety   Needs help with the following daily activites None of the above   Safety concerns in the home None of the above         2/10/2024   Dental   Dentist two times every year? Yes         2/10/2024   Hearing Screening   Hearing concerns? None of the above         2/10/2024   Driving Risk Screening   Patient/family members have concerns about driving No         2/10/2024   General Alertness/Fatigue Screening   Have you been more tired than usual lately? No         2/10/2024   Urinary Incontinence Screening   Bothered by leaking urine in past 6 months No         2/10/2024   TB Screening   Were you born outside of [place]?  No         Today's PHQ-2 Score:       2/15/2024     1:02 PM   PHQ-2 ( 1999 [employer])   Q1: Little interest or pleasure in doing things 0   Q2: Feeling down, depressed or hopeless 0   PHQ-2 Score 0   Q1: Little interest or pleasure in doing things Not at all   Q2: Feeling down, depressed or hopeless Not at all   PHQ-2 Score 0           2/10/2024   Substance Use   Alcohol more than 3/day or more than 7/wk Yes   How often do you have a drink containing alcohol 4 or more times a week   How many alcohol drinks on typical day 1 or 2   How often do you have 5+  drinks at one occasion Never   Audit 2/3 Score 0   How often not able to stop drinking once started Never   How often failed to do what normally expected Never   How often needed first drink in am after a heavy drinking session Never   How often feeling of guilt or remorse after drinking Never   How often unable to remember what happened the night before Never   Have you or someone else been injured because of your drinking No   Has anyone been concerned or suggested you cut down on drinking No   TOTAL SCORE - AUDIT 4   Do you have a current opioid prescription? No   How severe/bad is pain from 1 to 10? 2/10   Do you use any other substances recreationally? (!) CANNABIS PRODUCTS     Social History     Tobacco Use    Smoking status: Light Smoker     Packs/day: 0.50     Years: 44.00     Additional pack years: 0.00     Total pack years: 22.00     Types: Cigarettes     Start date: 1/1/1980    Smokeless tobacco: Never    Tobacco comments:     2 packs per week   Substance Use Topics    Alcohol use: Yes     Alcohol/week: 0.0 standard drinks of alcohol     Comment: one or two cocktails/wine  four or five nights a week    Drug use: Yes     Types: Marijuana            No data to display                 Mammogram Screening - Mammogram every 1-2 years updated in Health Maintenance based on mutual decision making    The 10-year ASCVD risk score (Josephine BELTRE, et al., 2019) is: 18.3%    Values used to calculate the score:      Age: 73 years      Sex: Female      Is Non- : No      Diabetic: No      Tobacco smoker: Yes      Systolic Blood Pressure: 102 mmHg      Is BP treated: Yes      HDL Cholesterol: 82 mg/dL      Total Cholesterol: 224 mg/dL      Reviewed and updated as needed this visit by Provider   Tobacco  Allergies  Meds  Problems  Med Hx  Surg Hx  Fam Hx            BP Readings from Last 3 Encounters:   02/15/24 102/60   01/24/23 (!) 160/84   05/26/22 122/76    Wt Readings from Last 3 Encounters:  "  02/15/24 69.9 kg (154 lb 3.2 oz)   01/24/23 69.9 kg (154 lb 1.3 oz)   05/26/22 69.4 kg (153 lb)              Current providers sharing in care for this patient include:  Patient Care Team:  Talia Norman MD as PCP - General (Family Medicine)  Jose Laura MD as MD (Dermatology)  Talia Norman MD as Assigned PCP  Maya Ramírez PA-C as Assigned Surgical Provider      The following health maintenance items are reviewed in Epic and correct as of today:  Health Maintenance   Topic Date Due    ANNUAL REVIEW OF  ORDERS  Never done    Pneumococcal Vaccine: 65+ Years (1 of 2 - PCV) Never done    ZOSTER IMMUNIZATION (1 of 2) Never done    RSV VACCINE (Pregnancy & 60+) (1 - 1-dose 60+ series) Never done    INFLUENZA VACCINE (1) 09/01/2023    LUNG CANCER SCREENING  02/14/2024    NICOTINE/TOBACCO CESSATION COUNSELING Q 1 YR  01/24/2024    DEXA  02/14/2025    MAMMO SCREENING  02/14/2025    MEDICARE ANNUAL WELLNESS VISIT  02/15/2025    BMP  02/15/2025    FALL RISK ASSESSMENT  02/15/2025    LIPID  01/22/2026    COLORECTAL CANCER SCREENING  01/30/2026    GLUCOSE  02/15/2027    ADVANCE CARE PLANNING  01/24/2028    DTAP/TDAP/TD IMMUNIZATION (3 - Td or Tdap) 11/10/2030    PHQ-2 (once per calendar year)  Completed    COVID-19 Vaccine  Completed    IPV IMMUNIZATION  Aged Out    HPV IMMUNIZATION  Aged Out    MENINGITIS IMMUNIZATION  Aged Out    RSV MONOCLONAL ANTIBODY  Aged Out    HEPATITIS C SCREENING  Discontinued          Objective    Exam  /60 (BP Location: Right arm, Patient Position: Sitting, Cuff Size: Adult Regular)   Pulse 85   Temp 98.1  F (36.7  C) (Temporal)   Resp 20   Ht 1.545 m (5' 0.83\")   Wt 69.9 kg (154 lb 3.2 oz)   SpO2 96%   BMI 29.30 kg/m     Estimated body mass index is 29.3 kg/m  as calculated from the following:    Height as of this encounter: 1.545 m (5' 0.83\").    Weight as of this encounter: 69.9 kg (154 lb 3.2 oz).    Physical Exam  GENERAL: healthy, alert and no " distress  EYES: Eyes grossly normal to inspection, conjunctivae and sclerae normal  HENT: ear canals and TM's normal  NECK: no adenopathy, no asymmetry, masses, or scars and thyroid normal to palpation  RESP: lungs clear to auscultation - no rales, rhonchi or wheezes  CV: regular rate and rhythm, normal S1 S2, no S3 or S4, no murmur, click or rub, no peripheral edema  ABDOMEN: soft, nontender, no hepatosplenomegaly, no masses  MS: no gross musculoskeletal defects noted, no edema  SKIN: no suspicious lesions or rashes  NEURO: Grossly normal strength and tone, mentation intact and speech normal  PSYCH: mentation appears normal, affect normal/bright        2/15/2024   Mini Cog   Clock Draw Score 2 Normal   3 Item Recall 3 objects recalled   Mini Cog Total Score 5            Signed Electronically by: Talia Norman MD      Lung Cancer Screening Shared Decision Making Visit     Deborah L Schoenholz, a 73 year old female, is eligible for lung cancer screening    History   Smoking Status    Light Smoker    Packs/day: 0.50    Years: 44.00    Types: Cigarettes    Start date: 1/1/1980   Smokeless Tobacco    Never       I have discussed with patient the risks and benefits of screening for lung cancer with low-dose CT.     The risks include:    radiation exposure: one low dose chest CT has as much ionizing radiation as about 15 chest x-rays, or 6 months of background radiation living in [place]      false positives: most findings/nodules are NOT cancer, but some might still require additional diagnostic evaluation, including biopsy    over-diagnosis: some slow growing cancers that might never have been clinically significant will be detected and treated unnecessarily     The benefit of early detection of lung cancer is contingent upon adherence to annual screening or more frequent follow up if indicated.     Furthermore, to benefit from screening, Alaina must be willing and able to undergo diagnostic procedures, if  indicated. Although no specific guide is available for determining severity of comorbidities, it is reasonable to withhold screening in patients who have greater mortality risk from other diseases.     We did discuss that the best way to prevent lung cancer is to not smoke.    Some patients may value a numeric estimation of lung cancer risk when evaluating if lung cancer screening is right for them, here is one calculator:    ShouldIScreen

## 2024-02-15 NOTE — PATIENT INSTRUCTIONS
"You are a candidate for the new RSV vaccine.  RSV (Respiratory Syncytial Virus) is a common respiratory virus that frequently causes the \"common cold\" in healthy young adults.  Unfortunately, it can cause a severe viral pneumonia in infants and older people - especially those with other chronic medical conditions (such as diabetes, asthma, heart disease, liver and kidney disease).  If you are on medicare you must get this vaccine at a pharmacy.  The pharmacist will let you know beforehand if there is a copay and can administer the vaccine.       I recommend that patients 50 and over get the Shingrix vaccine.  One in 3 adults in the [place] will get shingles and the risk increases with age.  Shingles can cause debilitating and persistent nerve pain and can increase your risk for stroke.  If you are on medicare you must get this vaccine at a pharmacy.  The second (final) dose is given 2-6 months after the first dose and cannot be given any sooner than 2 months after the first dose.  You should be aware that patients often report feeling a bit under the weather after the injection, including fatigue, malaise, and low-grade fever that may last a couple days.  Rarely patients can get a mild, shingles-like rash.   But these symptoms are much better than the Shingles disease.     FYI:  My schedule has been booking out very far in advance (2 months).  I apologize for the lack of timely access.  If you need to be seen for a chronic condition or preventive (wellness) visit, please be sure to schedule that appointment 2-3 months in advance.  If you have a concern that you feel cannot wait until my next available appointment (such as a hospital follow-up, pre-op, or new symptom of concern) please call clinic at [phone] and press #2.  Then ask to speak to one of the [facility] nurses who can often offer you an appointment with me within a week or so.    I do ask that all patients who are taking chronic medications for " conditions that I am managing schedule an in-person visit with me at least once a year.     Patient Education    Preventive Care Advice   This is general advice given by our system to help you stay healthy. However, your care team may have specific advice just for you. Please talk to your care team about your preventive care needs.  Nutrition  Eat 5 or more servings of fruits and vegetables each day.  Try wheat bread, brown rice and whole grain pasta (instead of white bread, rice, and pasta).  Get enough calcium and vitamin D. Check the label on foods and aim for 100% of the RDA (recommended daily allowance).  Lifestyle  Exercise at least 150 minutes each week  (30 minutes a day, 5 days a week).  Do muscle strengthening activities 2 days a week. These help control your weight and prevent disease.  No smoking.  Wear sunscreen to prevent skin cancer.  Have a dental exam and cleaning every 6 months.  Yearly exams  See your health care team every year to talk about:  Any changes in your health.  Any medicines your care team has prescribed.  Preventive care, family planning, and ways to prevent chronic diseases.  Shots (vaccines)   HPV shots (up to age 26), if you've never had them before.  Hepatitis B shots (up to age 59), if you've never had them before.  COVID-19 shot: Get this shot when it's due.  Flu shot: Get a flu shot every year.  Tetanus shot: Get a tetanus shot every 10 years.  Pneumococcal, hepatitis A, and RSV shots: Ask your care team if you need these based on your risk.  Shingles shot (for age 50 and up)  General health tests  Diabetes screening:  Starting at age 35, Get screened for diabetes at least every 3 years.  If you are younger than age 35, ask your care team if you should be screened for diabetes.  Cholesterol test: At age 39, start having a cholesterol test every 5 years, or more often if advised.  Bone density scan (DEXA): At age 50, ask your care team if you should have this scan for  osteoporosis (brittle bones).  Hepatitis C: Get tested at least once in your life.  STIs (sexually transmitted infections)  Before age 24: Ask your care team if you should be screened for STIs.  After age 24: Get screened for STIs if you're at risk. You are at risk for STIs (including HIV) if:  You are sexually active with more than one person.  You don't use condoms every time.  You or a partner was diagnosed with a sexually transmitted infection.  If you are at risk for HIV, ask about PrEP medicine to prevent HIV.  Get tested for HIV at least once in your life, whether you are at risk for HIV or not.  Cancer screening tests  Cervical cancer screening: If you have a cervix, begin getting regular cervical cancer screening tests starting at age 21.  Breast cancer scan (mammogram): If you've ever had breasts, begin having regular mammograms starting at age 40. This is a scan to check for breast cancer.  Colon cancer screening: It is important to start screening for colon cancer at age 45.  Have a colonoscopy test every 10 years (or more often if you're at risk) Or, ask your provider about stool tests like a FIT test every year or Cologuard test every 3 years.  To learn more about your testing options, visit:   https://[email or web address]/439948.pdf.  For help making a decision, visit:   https://bit.ly/pc33037.  Prostate cancer screening test: If you have a prostate, ask your care team if a prostate cancer screening test (PSA) at age 55 is right for you.  Lung cancer screening: If you are a current or former smoker ages 50 to 80, ask your care team if ongoing lung cancer screenings are right for you.  For informational purposes only. Not to replace the advice of your health care provider. Copyright   2023 [employer]. All rights reserved. Clinically reviewed by the [facility] Transitions Program. [employer] 816920 - REV 01/24.    Lung Cancer Screening   Frequently Asked Questions  If you are at  high-risk for lung cancer, getting screened with low-dose computed tomography (LDCT) every year can help save your life. This handout offers answers to some of the most common questions about lung cancer screening. If you have other questions, please call [phone] ([phone]).     What is it?  Lung cancer screening uses special X-ray technology to create an image of your lung tissue. The exam is quick and easy and takes less than 10 seconds. We don t give you any medicine or use any needles. You can eat before and after the exam. You don t need to change your clothes as long as the clothing on your chest doesn t contain metal. But, you do need to be able to hold your breath for at least 6 seconds during the exam.    What is the goal of lung cancer screening?  The goal of lung cancer screening is to save lives. Many times, lung cancer is not found until a person starts having physical symptoms. Lung cancer screening can help detect lung cancer in the earliest stages when it may be easier to treat.    Who should be screened for lung cancer?  We suggest lung cancer screening for anyone who is at high-risk for lung cancer. You are in the high-risk group if you:     are between the ages of 55 and 79, and   have smoked at least 1 pack of cigarettes a day for 20 or more years, and   still smoke or have quit within the past 15 years.    However, if you have a new cough or shortness of breath, you should talk to your doctor before being screened.    Why does it matter if I have symptoms?  Certain symptoms can be a sign that you have a condition in your lungs that should be checked and treated by your doctor. These symptoms include fever, chest pain, a new or changing cough, shortness of breath that you have never felt before, coughing up blood or unexplained weight loss. Having any of these symptoms can greatly affect the results of lung cancer screening.       Should all smokers get an LDCT lung cancer  screening exam?  It depends. Lung cancer screening is for a very specific group of men and women who have a history of heavy smoking over a long period of time (see  Who should be screened for lung cancer  above).  I am in the high-risk group, but have been diagnosed with cancer in the past. Is LDCT lung cancer screening right for me?  In some cases, you should not have LDCT lung screening, such as when your doctor is already following your cancer with CT scan studies. Your doctor will help you decide if LDCT lung screening is right for you.  Do I need to have a screening exam every year?  Yes. If you are in the high-risk group described earlier, you should get an LDCT lung cancer screening exam every year until you are 79, or are no longer willing or able to undergo screening and possible procedures to diagnose and treat lung cancer.  How effective is LDCT at preventing death from lung cancer?  Studies have shown that LDCT lung cancer screening can lower the risk of death from lung cancer by 20 percent in people who are at high-risk.  What are the risks?  There are some risks and limitations of LDCT lung cancer screening. We want to make sure you understand the risks and benefits, so please let us know if you have any questions. Your doctor may want to talk with you more about these risks.   Radiation exposure: As with any exam that uses radiation, there is a very small increased risk of cancer. The amount of radiation in LDCT is small--about the same amount a person would get from a mammogram. Your doctor orders the exam when he or she feels the potential benefits outweigh the risks.   False negatives: No test is perfect, including LDCT. It is possible that you may have a medical condition, including lung cancer, that is not found during your exam. This is called a false negative result.   False positives and more testing: LDCT very often finds something in the lung that could be cancer, but in fact is not. This  is called a false positive result. False positive tests often cause anxiety. To make sure these findings are not cancer, you may need to have more tests. These tests will be done only if you give us permission. Sometimes patients need a treatment that can have side effects, such as a biopsy. For more information on false positives, see  What can I expect from the results?    Findings not related to lung cancer: Your LDCT exam also takes pictures of areas of your body next to your lungs. In a very small number of cases, the CT scan will show an abnormal finding in one of these areas, such as your kidneys, adrenal glands, liver or thyroid. This finding may not be serious, but you may need more tests. Your doctor can help you decide what other tests you may need, if any.  What can I expect from the results?  About 1 out of 4 LDCT exams will find something that may need more tests. Most of the time, these findings are lung nodules. Lung nodules are very small collections of tissue in the lung. These nodules are very common, and the vast majority--more than 97 percent--are not cancer (benign). Most are normal lymph nodes or small areas of scarring from past infections.  But, if a small lung nodule is found to be cancer, the cancer can be cured more than 90 percent of the time. To know if the nodule is cancer, we may need to get more images before your next yearly screening exam. If the nodule has suspicious features (for example, it is large, has an odd shape or grows over time), we will refer you to a specialist for further testing.  Will my doctor also get the results?  Yes. Your doctor will get a copy of your results.  Is it okay to keep smoking now that there s a cancer screening exam?  No. Tobacco is one of the strongest cancer-causing agents. It causes not only lung cancer, but other cancers and cardiovascular (heart) diseases as well. The damage caused by smoking builds over time. This means that the longer you  smoke, the higher your risk of disease. While it is never too late to quit, the sooner you quit, the better.  Where can I find help to quit smoking?  The best way to prevent lung cancer is to stop smoking. If you have already quit smoking, congratulations and keep it up! For help on quitting smoking, please call [employer] at 0-285-QUITNOW (1-700.668.9282) or the American Cancer Society at 1-515.122.2767 to find local resources near you.  One-on-one health coaching:  If you d prefer to work individually with a health care provider on tobacco cessation, we offer:     Medication Therapy Management:  Our specially trained pharmacists work closely with you and your doctor to help you quit smoking.  Call [phone] or [phone] (toll free).

## 2024-02-16 NOTE — RESULT ENCOUNTER NOTE
Jw Baer  Nice results!   This includes urine albumin (protein) level, Vitamin D level, and basic metabolic panel results (blood salts, blood sugar, and kidney function) .  Minor highs/lows were noted which are not clinically significant.     Talia Norman MD

## 2024-03-15 ENCOUNTER — MYC MEDICAL ADVICE (OUTPATIENT)
Dept: FAMILY MEDICINE | Facility: CLINIC | Age: 74
End: 2024-03-15
Payer: COMMERCIAL

## 2024-03-15 DIAGNOSIS — I10 HYPERTENSION GOAL BP (BLOOD PRESSURE) < 130/80: ICD-10-CM

## 2024-03-15 NOTE — TELEPHONE ENCOUNTER
" -- please review. Most recent entered in chart.     \"Jw Kleina. Have been taking my pressure at home as you requested after cutting my dose in half; seems to be working fine. Last week 121/66 and this week 119/66. I'm happy:)\"    Matilda Ring RN  [facility]      "  used

## 2024-03-18 RX ORDER — AMLODIPINE AND VALSARTAN 5; 160 MG/1; MG/1
0.5 TABLET ORAL DAILY
Qty: 45 TABLET | Refills: 2 | Status: CANCELLED | OUTPATIENT
Start: 2024-03-18

## 2024-03-18 NOTE — TELEPHONE ENCOUNTER
"Dr. Norman-Please review patient's response    \"Much better to cut in half and only do one - I am not very good about remembering regular things:) Something in me despises habit. . . \"    Thank you!  ANTONIO PearsonN, RN-BC  [facility]    "

## 2024-06-18 ENCOUNTER — NURSE TRIAGE (OUTPATIENT)
Dept: NURSING | Facility: CLINIC | Age: 74
End: 2024-06-18
Payer: COMMERCIAL

## 2024-06-18 NOTE — TELEPHONE ENCOUNTER
Wife took covid tests at home and she tested positive. He tested negative. What are the next steps.  Daughter and one of the kids were positive. Buffy has head cold for 1.5 days. He gave his wife the phone.  [facility]    COVID Positive/Requesting COVID treatment    Patient is positive for COVID and requesting treatment options.    Date of positive COVID test (PCR or at home)? 6/18/24  Current COVID symptoms: muscle or body aches  Date COVID symptoms began: 6/16/24    Message should be routed to clinic RN pool. Best phone number to use for call back: [phone].      I connected with scheduling to set up a virtual visit with a provider for Paxlovid treatment.      Anastasia Cortes RN  [facility]    Reason for Disposition   [1] HIGH RISK patient (e.g., weak immune system, age > 64 years, obesity with BMI 30 or higher, pregnant, chronic lung disease or other chronic medical condition) AND [2] COVID symptoms (e.g., cough, fever)  (Exceptions: Already seen by PCP and no new or worsening symptoms.)    Additional Information   Negative: SEVERE difficulty breathing (e.g., struggling for each breath, speaks in single words)   Negative: Difficult to awaken or acting confused (e.g., disoriented, slurred speech)   Negative: Bluish (or gray) lips or face now   Negative: Shock suspected (e.g., cold/pale/clammy skin, too weak to stand, low BP, rapid pulse)   Negative: Sounds like a life-threatening emergency to the triager   Negative: [1] Diagnosed or suspected COVID-19 AND [2] symptoms lasting 3 or more weeks   Negative: [1] COVID-19 exposure AND [2] no symptoms   Negative: COVID-19 vaccine reaction suspected (e.g., fever, headache, muscle aches) occurring 1 to 3 days after getting vaccine   Negative: COVID-19 vaccine, questions about   Negative: [1] Lives with someone known to have influenza (flu test positive) AND [2] flu-like symptoms (e.g., cough, runny nose, sore throat, SOB; with or without fever)    Negative: [1] Possible COVID-19 symptoms AND [2] triager concerned about severity of symptoms or other causes   Negative: COVID-19 and breastfeeding, questions about   Negative: SEVERE or constant chest pain or pressure  (Exception: Mild central chest pain, present only when coughing.)   Negative: MODERATE difficulty breathing (e.g., speaks in phrases, SOB even at rest, pulse 100-120)   Negative: [1] Headache AND [2] stiff neck (can't touch chin to chest)   Negative: Oxygen level (e.g., pulse oximetry) 90 percent or lower   Negative: Chest pain or pressure  (Exception: MILD central chest pain, present only when coughing.)   Negative: [1] Drinking very little AND [2] dehydration suspected (e.g., no urine > 12 hours, very dry mouth, very lightheaded)   Negative: Patient sounds very sick or weak to the triager   Negative: MILD difficulty breathing (e.g., minimal/no SOB at rest, SOB with walking, pulse <100)   Negative: Fever > 103 F (39.4 C)   Negative: [1] Fever > 101 F (38.3 C) AND [2] age > 60 years   Negative: [1] Fever > 100.0 F (37.8 C) AND [2] bedridden (e.g., CVA, chronic illness, recovering from surgery)   Negative: Oxygen level (e.g., pulse oximetry) 91 to 94 percent    Protocols used: Coronavirus (COVID-19) Diagnosed or [id number]

## 2024-06-19 ENCOUNTER — VIRTUAL VISIT (OUTPATIENT)
Dept: FAMILY MEDICINE | Facility: CLINIC | Age: 74
End: 2024-06-19
Payer: COMMERCIAL

## 2024-06-19 DIAGNOSIS — U07.1 INFECTION DUE TO 2019 NOVEL CORONAVIRUS: Primary | ICD-10-CM

## 2024-06-19 PROCEDURE — 99213 OFFICE O/P EST LOW 20 MIN: CPT | Mod: 95 | Performed by: FAMILY MEDICINE

## 2024-06-19 NOTE — PROGRESS NOTES
Buffy is a 74 year old who is being evaluated via a billable video visit.          Assessment & Plan     Infection due to 2019 novel coronavirus  She qualifies for Paxlovid as she is > 64 yo, has HTN, is a longstanding tobacco user and is within 5 days of symptom onset.  I advised patient to start Paxlovid asap.  I discussed that Paxlovid is taken 3 pills at a time twice daily for 5 days.  I reviewed drug-drug interactions and advised patient to monitor her blood pressure daily and hold her amlodipine-valsartan for any dizziness or low BP.  I reviewed potential side effects including diarrhea and dysgeusia.  I recommended self-isolation for 5 days; if feeling better after 5 days its ok to go out into the community but wear a mask for an additional 5 days. I also reviewed potential for rebound COVID and advised that if symptoms resolve and then recur patient should re-test and, if positive, start self-isolation anew but that we cannot re-prescribe Paxlovid.  Patient instructed to contact clinic for any worsening symptoms or concerns.   - nirmatrelvir and ritonavir (PAXLOVID) 300 mg/100 mg therapy pack  Dispense: 30 tablet; Refill: 0    See Patient Instructions      Subjective   Buffy is a 74 year old, presenting for the following health issues:  No chief complaint on file.        2/15/2024     1:13 PM   Additional Questions   Roomed by Melissa   Accompanied by alone       Video Start Time: 8:31 AM    History of Present Illness       Reason for visit:  Covid  Symptom onset:  1-3 days ago  Symptoms include:  Head cold  Symptom intensity:  Moderate  Symptom progression:  Staying the same  Had these symptoms before:  Yes  What makes it worse:  Aches and muscle pains    She eats 2-3 servings of fruits and vegetables daily.She consumes 0 sweetened beverage(s) daily.She exercises with enough effort to increase her heart rate 20 to 29 minutes per day.  She exercises with enough effort to increase her heart rate 5 days per  week.   She is taking medications regularly.         COVID-19 Symptom Review  How many days ago did these symptoms start? Symptoms started yesterday morning.  Tested positive yesterday.  Had visited with her daughter's family 5 days ago and they subsequently reported having COVID.    Are any of the following symptoms significant for you?  New or worsening difficulty breathing? No  Worsening cough? Yes, I am coughing up mucus.  Fever or chills? No  Headache: No  Sore throat: No  Chest pain: No  Diarrhea: No  Body aches? YES    What treatments has patient tried? aspirin   Does patient live in a nursing home, group home, or shelter? No  Does patient have a way to get food/medications during quarantined? Yes, I have a friend or family member who can help me.        She hasn't checked her BP lately but after her last dose change of amlodipine-valsartan (when we decreased her dose to a half tab daily) her SBP was in the 120's.        Objective         Vitals:  No vitals were obtained today due to virtual visit.    Physical Exam   GENERAL: alert and no distress  EYES: Eyes grossly normal to inspection.  No discharge or erythema, or obvious scleral/conjunctival abnormalities.  RESP: Occasional cough.  No audible wheeze or visible cyanosis.    SKIN: Visible skin clear. No significant rash, abnormal pigmentation or lesions.  NEURO: Cranial nerves grossly intact.  Mentation and speech appropriate for age.  PSYCH: Appropriate affect, tone, and pace of words    Creatinine   Date Value Ref Range Status   02/15/2024 0.65 0.51 - 0.95 mg/dL Final   09/25/2017 0.57 0.52 - 1.04 mg/dL Final          Video-Visit Details    Type of service:  Video Visit   Video End Time:8:49 AM  Originating Location (pt. Location): Home    Distant Location (provider location):  Off-site  Platform used for Video Visit: Juliano  Signed Electronically by: Talia Norman MD

## 2024-06-19 NOTE — PATIENT INSTRUCTIONS
"Monitor your blood pressure daily.  If it starts to drop or you get dizzy hold the amlodipine-valsartan and continue to monitor your BP.  Resume your amlodipine-valsartan once you finish the Paxlovid if you blood pressure is back to normal (>110/60).      I recommend that you self-isolate in your home for the first 5 days after the onset of your symptoms.  You may leave your home after 5 days of self-isolation if you wear a mask in public for the next 5 days to prevent spreading COVID to others around you.       During self-isolation  Stay in your own room, even for meals. Use your own bathroom if you can.  Stay away from others in your home. No hugging, kissing or shaking hands. No visitors.  Don't go to work, school or anywhere else.  Clean \"high touch\" surfaces often (doorknobs, counters, handles). Use household cleaning spray or wipes. You'll find a full list of  on the EPA website: www.epa.gov/pesticide-registration/list-n-disinfectants-use-against-sars-cov-2.  Cover your mouth and nose with a mask or other face covering to avoid spreading germs.  Wash your hands and face often. Use soap and water.  Caregivers should wear gloves while washing dishes, handling laundry and cleaning bedrooms and bathrooms.  Use caution when washing and drying laundry: Don't shake dirty laundry and use the warmest water setting that you can.  For more tips on managing your health at home, go to www.cdc.gov/coronavirus/2019-ncov/downloads/10Things.pdf.    How can I take care of myself at home?  1)  Get lots of rest. Drink extra fluids (unless a doctor has told you not to).  2)  Take Tylenol (acetaminophen) for fever or pain. If you have liver or kidney problems, ask your family doctor if it's okay to take Tylenol.     Adults can take either:  650 mg (two 325 mg pills) every 4 to 6 hours, or   1,000 mg (two 500 mg pills) every 8 hours as needed.  Note: Don't take more than 3,000 mg in one day. Acetaminophen is found in many " medicines (both prescribed and over-the-counter medicines). Read all labels to be sure you don't take too much.   For children, check the Tylenol bottle for the right dose. The dose is based on the child's age or weight.  3)  If you have other health problems (like cancer, heart failure, an organ transplant or severe kidney disease): Call your specialty clinic if you don't feel better in the next 2 days.  4)  Know when to call 911. Emergency warning signs include:  Trouble breathing or shortness of breath  Chest pain or pressure that doesn't go away  Feeling confused like you haven't felt before, or not being able to wake up  Bluish-colored lips or face    5) If your doctor prescribed a blood thinner medicine: Follow their instructions.    For informational purposes only. Not to replace the advice of your health care provider. Clinically reviewed by Infection Prevention and the [facility] COVID-19 Clinical Team. Copyright   2020 [facility]. All rights reserved. [employer] [id number] - Rev 8/4/20.

## 2024-06-29 ENCOUNTER — MYC REFILL (OUTPATIENT)
Dept: FAMILY MEDICINE | Facility: CLINIC | Age: 74
End: 2024-06-29
Payer: COMMERCIAL

## 2024-06-29 DIAGNOSIS — I10 HYPERTENSION GOAL BP (BLOOD PRESSURE) < 130/80: ICD-10-CM

## 2024-07-01 RX ORDER — AMLODIPINE AND VALSARTAN 5; 160 MG/1; MG/1
0.5 TABLET ORAL DAILY
Qty: 45 TABLET | Refills: 3 | Status: SHIPPED | OUTPATIENT
Start: 2024-07-01

## 2024-08-20 ENCOUNTER — MYC REFILL (OUTPATIENT)
Dept: FAMILY MEDICINE | Facility: CLINIC | Age: 74
End: 2024-08-20
Payer: COMMERCIAL

## 2024-08-20 DIAGNOSIS — G47.00 PERSISTENT INSOMNIA: ICD-10-CM

## 2024-08-21 RX ORDER — ZOLPIDEM TARTRATE 5 MG/1
5 TABLET ORAL
Qty: 30 TABLET | Refills: 0 | Status: SHIPPED | OUTPATIENT
Start: 2024-08-21

## 2024-09-11 ENCOUNTER — OFFICE VISIT (OUTPATIENT)
Dept: DERMATOLOGY | Facility: CLINIC | Age: 74
End: 2024-09-11
Payer: COMMERCIAL

## 2024-09-11 DIAGNOSIS — L82.0 SEBORRHEIC KERATOSES, INFLAMED: ICD-10-CM

## 2024-09-11 DIAGNOSIS — Z12.83 SKIN CANCER SCREENING: Primary | ICD-10-CM

## 2024-09-11 DIAGNOSIS — D18.01 CHERRY ANGIOMA: ICD-10-CM

## 2024-09-11 DIAGNOSIS — D22.9 MULTIPLE BENIGN NEVI: ICD-10-CM

## 2024-09-11 DIAGNOSIS — Z85.828 HISTORY OF NONMELANOMA SKIN CANCER: ICD-10-CM

## 2024-09-11 DIAGNOSIS — L81.4 SOLAR LENTIGO: ICD-10-CM

## 2024-09-11 DIAGNOSIS — L82.1 SEBORRHEIC KERATOSES: ICD-10-CM

## 2024-09-11 PROCEDURE — 99213 OFFICE O/P EST LOW 20 MIN: CPT | Performed by: PHYSICIAN ASSISTANT

## 2024-09-11 ASSESSMENT — PAIN SCALES - GENERAL: PAINLEVEL: NO PAIN (0)

## 2024-09-11 NOTE — PROGRESS NOTES
[facility] Dermatology Note  Encounter Date: Sep 11, 2024  Office Visit     Dermatology Problem List:  1. History NMSC  - BCC, left central cheek s/p Mohs 11/14/14  2. Skin cancer screening 09/11/2024    ____________________________________________    Assessment & Plan:     # Skin cancer screening with multiple benign nevi, solar lentigines  - ABCDEs: Counseled ABCDEs of melanoma: Asymmetry, Border (irregularity), Color (not uniform, changes in color), Diameter (greater than 6 mm which is about the size of a pencil eraser), and Evolving (any changes in preexisting moles).  - Sun protection: Counseled SPF30+ sunscreen, UPF clothing, sun avoidance, tanning bed avoidance.    # Cherry angiomas and seborrheic keratoses (including lesion of concern on the back),  Benign, reassurance given.      #History of basal cell carcinomas, left central cheek  Recommend continuing annual skin exams and discussed attempting more sun protection.    #Onychodystrophy,   Pt reports she picks at nails. Not bothersome to her.     # Inflamed Seborrheic Keratosis, right upper back/posterior shoulder.   - patient defers treatment and has elected to use otc home remedies. Return if worsening.       Procedures Performed:   None      Follow-up: 1 year(s) in-person, or earlier for new or changing lesions    Staff and Scribe:   Scribe Disclosure:   By signing my name below, I, Anne Green, attest that this documentation has been prepared under the direction and in the presence of Maya Ramírez PA-C.  - Electronically Signed: Anne Green 09/11/24       Provider Disclosure:  I agree with above History, Review of Systems, Physical exam and Plan.  I have reviewed the content of the documentation and have edited it as needed. I have personally performed the services documented here and the documentation accurately represents those services and the decisions I have made.      Electronically signed by:  All risks, benefits  and alternatives were discussed with patient.  Patient is in agreement and understands the assessment and plan.  All questions were answered.  Sun Screen Education was given.   Return to Clinic annually or sooner as needed.   Maya Ramírez PA-C      ____________________________________________    CC: Skin Check (FBSE, 1 yr follow up; spot on back )    HPI:  Ms. Deborah L Schoenholz is a(n) 74 year old female who presents today as a return patient for a FBSC.    Patient notes the spot on her back is no longer concerning, however it will occasionally itch and is crusty. She notes it is exposed to a lot, as she gardens. Patient spends a lot of time in the sun and uses sunscreen 80% of the time. Patient has been chronically picking her fingernails at nighttime, and also notes a bunion on her foot.    Patient is otherwise feeling well, without additional skin concerns.     Labs Reviewed:  N/A    Physical exam:  Vitals: There were no vitals taken for this visit.  GEN: This is a well developed, well-nourished female in no acute distress, in a pleasant mood.    SKIN: Full skin, which includes the head/face, both arms, chest, back, abdomen,both legs, genitalia and/or groin buttocks, digits and/or nails, was examined.  - There is a tan to brown waxy stuck on papule with surrounding erythema on the posterior shoulder.   - scar on L cheek from previous bcc  - There are dome shaped bright red papules on the head/neck, trunk, extremities.   - Multiple regular brown pigmented macules and papules are identified on the head/neck, trunk, extremities.   - Scattered brown macules on sun exposed areas.  - There are waxy stuck on tan to brown papules on the head/neck, trunk, extremities.  - No other lesions of concern on areas examined.       Medications:  Current Outpatient Medications   Medication Sig Dispense Refill    amLODIPine-valsartan (EXFORGE) 5-160 MG tablet Take 0.5 tablets by mouth daily 45 tablet 3    ascorbic acid  (VITAMIN C) 500 MG tablet Take 1 tablet by mouth daily.      melatonin 5 MG tablet Take 1 tablet (5 mg) by mouth nightly as needed for sleep      multivitamin (CENTRUM SILVER) tablet       zolpidem (AMBIEN) 5 MG tablet Take 1 tablet (5 mg) by mouth nightly as needed for sleep. 30 tablet 0     No current facility-administered medications for this visit.      Past Medical History:   Patient Active Problem List   Diagnosis    Carpal tunnel syndrome    Chronic insomnia    Tobacco abuse    Elevated blood pressure reading without diagnosis of hypertension    Osteopenia of both hips    Hypertension goal BP (blood pressure) < 130/80     Past Medical History:   Diagnosis Date    Basal cell carcinoma     Breast disorder 2005    benign small mass    Carpal tunnel syndrome     Chronic insomnia 11/13/2003    Hypertension goal BP (blood pressure) < 130/80 04/03/2023    Started amlodipine-benazepril 4/2023    Nicotine addiction     Osteopenia of both hips 02/19/2023    DEXA 2/2023: lowest T = -1.5 (indira fem necks).  By FRAX tool (http://www.shef.ac.uk/FRAX/), 10-year fracture risk calculates to 11.1% for any pathologic fracture (significant if > 20%) and 3.2% for hip fracture (significant if > 3%).      Problems with sexual function        CC Talia Norman MD  [address]  [place] [address] on close of this encounter.

## 2024-09-11 NOTE — PATIENT INSTRUCTIONS

## 2024-09-11 NOTE — LETTER
9/11/2024       RE: Deborah L Schoenholz  [address]  [facility] [address]     Dear Colleague,    Thank you for referring your patient, Deborah L Schoenholz, to the [facility] [place] at [facility]. Please see a copy of my visit note below.    [facility] Dermatology Note  Encounter Date: Sep 11, 2024  Office Visit     Dermatology Problem List:  1. History NMSC  - BCC, left central cheek s/p Mohs 11/14/14  2. Skin cancer screening 09/11/2024    ____________________________________________    Assessment & Plan:     # Skin cancer screening with multiple benign nevi, solar lentigines  - ABCDEs: Counseled ABCDEs of melanoma: Asymmetry, Border (irregularity), Color (not uniform, changes in color), Diameter (greater than 6 mm which is about the size of a pencil eraser), and Evolving (any changes in preexisting moles).  - Sun protection: Counseled SPF30+ sunscreen, UPF clothing, sun avoidance, tanning bed avoidance.    # Cherry angiomas and seborrheic keratoses (including lesion of concern on the back),  Benign, reassurance given.      #History of basal cell carcinomas, left central cheek  Recommend continuing annual skin exams and discussed attempting more sun protection.    #Onychodystrophy,   Pt reports she picks at nails. Not bothersome to her.     # Inflamed Seborrheic Keratosis, right upper back/posterior shoulder.   - patient defers treatment and has elected to use otc home remedies. Return if worsening.       Procedures Performed:   None      Follow-up: 1 year(s) in-person, or earlier for new or changing lesions    Staff and Scribe:   Scribe Disclosure:   By signing my name below, I, Anne Green, attest that this documentation has been prepared under the direction and in the presence of Maya Ramírez PA-C.  - Electronically Signed: Anne Green 09/11/24       Provider Disclosure:  I agree with above  History, Review of Systems, Physical exam and Plan.  I have reviewed the content of the documentation and have edited it as needed. I have personally performed the services documented here and the documentation accurately represents those services and the decisions I have made.      Electronically signed by:  All risks, benefits and alternatives were discussed with patient.  Patient is in agreement and understands the assessment and plan.  All questions were answered.  Sun Screen Education was given.   Return to Clinic annually or sooner as needed.   Maya Ramírez PA-C      ____________________________________________    CC: Skin Check (FBSE, 1 yr follow up; spot on back )    HPI:  Ms. Deborah L Schoenholz is a(n) 74 year old female who presents today as a return patient for a FBSC.    Patient notes the spot on her back is no longer concerning, however it will occasionally itch and is crusty. She notes it is exposed to a lot, as she gardens. Patient spends a lot of time in the sun and uses sunscreen 80% of the time. Patient has been chronically picking her fingernails at nighttime, and also notes a bunion on her foot.    Patient is otherwise feeling well, without additional skin concerns.     Labs Reviewed:  N/A    Physical exam:  Vitals: There were no vitals taken for this visit.  GEN: This is a well developed, well-nourished female in no acute distress, in a pleasant mood.    SKIN: Full skin, which includes the head/face, both arms, chest, back, abdomen,both legs, genitalia and/or groin buttocks, digits and/or nails, was examined.  - There is a tan to brown waxy stuck on papule with surrounding erythema on the posterior shoulder.   - scar on L cheek from previous bcc  - There are dome shaped bright red papules on the head/neck, trunk, extremities.   - Multiple regular brown pigmented macules and papules are identified on the head/neck, trunk, extremities.   - Scattered brown macules on sun exposed areas.  -  There are waxy stuck on tan to brown papules on the head/neck, trunk, extremities.  - No other lesions of concern on areas examined.       Medications:  Current Outpatient Medications   Medication Sig Dispense Refill     amLODIPine-valsartan (EXFORGE) 5-160 MG tablet Take 0.5 tablets by mouth daily 45 tablet 3     ascorbic acid (VITAMIN C) 500 MG tablet Take 1 tablet by mouth daily.       melatonin 5 MG tablet Take 1 tablet (5 mg) by mouth nightly as needed for sleep       multivitamin (CENTRUM SILVER) tablet        zolpidem (AMBIEN) 5 MG tablet Take 1 tablet (5 mg) by mouth nightly as needed for sleep. 30 tablet 0     No current facility-administered medications for this visit.      Past Medical History:   Patient Active Problem List   Diagnosis     Carpal tunnel syndrome     Chronic insomnia     Tobacco abuse     Elevated blood pressure reading without diagnosis of hypertension     Osteopenia of both hips     Hypertension goal BP (blood pressure) < 130/80     Past Medical History:   Diagnosis Date     Basal cell carcinoma      Breast disorder 2005    benign small mass     Carpal tunnel syndrome      Chronic insomnia 11/13/2003     Hypertension goal BP (blood pressure) < 130/80 04/03/2023    Started amlodipine-benazepril 4/2023     Nicotine addiction      Osteopenia of both hips 02/19/2023    DEXA 2/2023: lowest T = -1.5 (indira fem necks).  By FRAX tool (http://www.shef.ac.uk/FRAX/), 10-year fracture risk calculates to 11.1% for any pathologic fracture (significant if > 20%) and 3.2% for hip fracture (significant if > 3%).       Problems with sexual function        CC Talia Norman MD  [address]  [place] [address] on close of this encounter.       Again, thank you for allowing me to participate in the care of your patient.      Sincerely,    Maya Ramírez PA-C

## 2024-11-03 ASSESSMENT — ANXIETY QUESTIONNAIRES: GAD7 TOTAL SCORE: 3

## 2024-11-15 ENCOUNTER — TELEPHONE (OUTPATIENT)
Dept: DERMATOLOGY | Facility: CLINIC | Age: 74
End: 2024-11-15
Payer: COMMERCIAL

## 2024-11-15 NOTE — TELEPHONE ENCOUNTER
Left Voicemail (1st Attempt) and Sent Mychart (1st Attempt) for the patient to call back and schedule the following:    Appointment type: Return Dermatology   Provider: Maya Ramírez PA-C  Return date: around 9/10/2025  Specialty phone number: [phone]  Additional appointment(s) needed: n/a  Additonal Notes: WQ appt request         Ritu Gan on 11/15/2024 at 2:35 PM

## 2025-02-22 ENCOUNTER — MYC REFILL (OUTPATIENT)
Dept: FAMILY MEDICINE | Facility: CLINIC | Age: 75
End: 2025-02-22
Payer: COMMERCIAL

## 2025-02-22 DIAGNOSIS — G47.00 PERSISTENT INSOMNIA: ICD-10-CM

## 2025-02-24 RX ORDER — ZOLPIDEM TARTRATE 5 MG/1
5 TABLET ORAL
Qty: 30 TABLET | Refills: 0 | Status: SHIPPED | OUTPATIENT
Start: 2025-02-24

## 2025-02-24 NOTE — TELEPHONE ENCOUNTER
Patient confirms she has only been taking this medication PRN and is taking as prescribed. Routing to clinician for review.    BONG Galdamez BSN, PHN, AMB-BC (she/her)  [facility] RN

## 2025-02-25 NOTE — TELEPHONE ENCOUNTER
That is correct.  It is prescribed PRN (not scheduled).  She should be taking it as little as possible.  Six months is good for a dispense of #30.  I checked the [place] Prescription Monitoring Program website which showed no concerning activity.       Talia Norman MD  [facility]    bed

## 2025-03-03 SDOH — HEALTH STABILITY: PHYSICAL HEALTH: ON AVERAGE, HOW MANY DAYS PER WEEK DO YOU ENGAGE IN MODERATE TO STRENUOUS EXERCISE (LIKE A BRISK WALK)?: 4 DAYS

## 2025-03-03 SDOH — HEALTH STABILITY: PHYSICAL HEALTH: ON AVERAGE, HOW MANY MINUTES DO YOU ENGAGE IN EXERCISE AT THIS LEVEL?: 40 MIN

## 2025-03-03 ASSESSMENT — SOCIAL DETERMINANTS OF HEALTH (SDOH): HOW OFTEN DO YOU GET TOGETHER WITH FRIENDS OR RELATIVES?: TWICE A WEEK

## 2025-03-04 ENCOUNTER — OFFICE VISIT (OUTPATIENT)
Dept: FAMILY MEDICINE | Facility: CLINIC | Age: 75
End: 2025-03-04
Attending: FAMILY MEDICINE
Payer: COMMERCIAL

## 2025-03-04 VITALS
WEIGHT: 157.4 LBS | DIASTOLIC BLOOD PRESSURE: 82 MMHG | TEMPERATURE: 97.3 F | HEIGHT: 61 IN | BODY MASS INDEX: 29.72 KG/M2 | SYSTOLIC BLOOD PRESSURE: 139 MMHG | HEART RATE: 79 BPM | RESPIRATION RATE: 18 BRPM | OXYGEN SATURATION: 98 %

## 2025-03-04 DIAGNOSIS — Z00.00 ENCOUNTER FOR MEDICARE ANNUAL WELLNESS EXAM: Primary | ICD-10-CM

## 2025-03-04 DIAGNOSIS — M81.0 AGE-RELATED OSTEOPOROSIS WITHOUT CURRENT PATHOLOGICAL FRACTURE: ICD-10-CM

## 2025-03-04 DIAGNOSIS — Z12.31 ENCOUNTER FOR SCREENING MAMMOGRAM FOR BREAST CANCER: ICD-10-CM

## 2025-03-04 DIAGNOSIS — G47.00 PERSISTENT INSOMNIA: ICD-10-CM

## 2025-03-04 DIAGNOSIS — I10 HYPERTENSION GOAL BP (BLOOD PRESSURE) < 130/80: ICD-10-CM

## 2025-03-04 DIAGNOSIS — Z87.891 PERSONAL HISTORY OF TOBACCO USE: ICD-10-CM

## 2025-03-04 LAB
ALBUMIN SERPL BCG-MCNC: 4 G/DL (ref 3.5–5.2)
ALP SERPL-CCNC: 81 U/L (ref 40–150)
ALT SERPL W P-5'-P-CCNC: 16 U/L (ref 0–50)
ANION GAP SERPL CALCULATED.3IONS-SCNC: 10 MMOL/L (ref 7–15)
AST SERPL W P-5'-P-CCNC: 21 U/L (ref 0–45)
BASOPHILS # BLD AUTO: 0 10E3/UL (ref 0–0.2)
BASOPHILS NFR BLD AUTO: 1 %
BILIRUB DIRECT SERPL-MCNC: 0.18 MG/DL (ref 0–0.3)
BILIRUB SERPL-MCNC: 0.5 MG/DL
BUN SERPL-MCNC: 21.1 MG/DL (ref 8–23)
CALCIUM SERPL-MCNC: 9.5 MG/DL (ref 8.8–10.4)
CHLORIDE SERPL-SCNC: 103 MMOL/L (ref 98–107)
CREAT SERPL-MCNC: 0.59 MG/DL (ref 0.51–0.95)
EGFRCR SERPLBLD CKD-EPI 2021: >90 ML/MIN/1.73M2
EOSINOPHIL # BLD AUTO: 0.2 10E3/UL (ref 0–0.7)
EOSINOPHIL NFR BLD AUTO: 3 %
ERYTHROCYTE [DISTWIDTH] IN BLOOD BY AUTOMATED COUNT: 13.7 % (ref 10–15)
GLUCOSE SERPL-MCNC: 93 MG/DL (ref 70–99)
HCO3 SERPL-SCNC: 27 MMOL/L (ref 22–29)
HCT VFR BLD AUTO: 40.3 % (ref 35–47)
HGB BLD-MCNC: 12.8 G/DL (ref 11.7–15.7)
IMM GRANULOCYTES # BLD: 0 10E3/UL
IMM GRANULOCYTES NFR BLD: 0 %
LYMPHOCYTES # BLD AUTO: 1.8 10E3/UL (ref 0.8–5.3)
LYMPHOCYTES NFR BLD AUTO: 31 %
MCH RBC QN AUTO: 30.6 PG (ref 26.5–33)
MCHC RBC AUTO-ENTMCNC: 31.8 G/DL (ref 31.5–36.5)
MCV RBC AUTO: 96 FL (ref 78–100)
MONOCYTES # BLD AUTO: 0.4 10E3/UL (ref 0–1.3)
MONOCYTES NFR BLD AUTO: 7 %
NEUTROPHILS # BLD AUTO: 3.4 10E3/UL (ref 1.6–8.3)
NEUTROPHILS NFR BLD AUTO: 59 %
PHOSPHATE SERPL-MCNC: 3.3 MG/DL (ref 2.5–4.5)
PLATELET # BLD AUTO: 316 10E3/UL (ref 150–450)
POTASSIUM SERPL-SCNC: 4 MMOL/L (ref 3.4–5.3)
PROT SERPL-MCNC: 6.5 G/DL (ref 6.4–8.3)
PTH-INTACT SERPL-MCNC: 27 PG/ML (ref 15–65)
RBC # BLD AUTO: 4.18 10E6/UL (ref 3.8–5.2)
SODIUM SERPL-SCNC: 140 MMOL/L (ref 135–145)
TOTAL PROTEIN SERUM FOR ELP: 6.5 G/DL (ref 6.4–8.3)
TSH SERPL DL<=0.005 MIU/L-ACNC: 0.51 UIU/ML (ref 0.3–4.2)
WBC # BLD AUTO: 5.8 10E3/UL (ref 4–11)

## 2025-03-04 PROCEDURE — 86334 IMMUNOFIX E-PHORESIS SERUM: CPT | Performed by: PATHOLOGY

## 2025-03-04 PROCEDURE — 84165 PROTEIN E-PHORESIS SERUM: CPT | Performed by: PATHOLOGY

## 2025-03-04 RX ORDER — PHENOL 1.4 %
10 AEROSOL, SPRAY (ML) MUCOUS MEMBRANE
COMMUNITY
Start: 2025-03-04

## 2025-03-04 RX ORDER — ZOLPIDEM TARTRATE 5 MG/1
5 TABLET ORAL
Qty: 30 TABLET | Refills: 0 | Status: CANCELLED | OUTPATIENT
Start: 2025-03-04

## 2025-03-04 ASSESSMENT — PAIN SCALES - GENERAL: PAINLEVEL_OUTOF10: NO PAIN (0)

## 2025-03-04 NOTE — PROGRESS NOTES
Preventive Care Visit  [facility]  Talia Norman MD, Family Medicine  Mar 4, 2025      Assessment & Plan     Encounter for Medicare annual wellness exam  Reviewed/updated Health Maintenance.  She declines recommended vaccines including PCV20.     Encounter for screening mammogram for breast cancer  - MA Screening Bilateral w/ Nick    Personal history of tobacco use  She notes she forgot to schedule last year's screening LDCT but would like to complete it this year.  She continues to smoke 0.5 ppd and does not desire smoking cessation.  - CT Chest Lung Cancer Screen Low Dose Without    Age-related osteoporosis without current pathological fracture  She does not desire medication to treat her osteoporosis.  She has started a calcium supplement.  I did recommend a work-up for secondary causes of osteoporosis and she was agreeable.    - Phosphorus  - Parathyroid Hormone Intact  - TSH with free T4 reflex  - Protein electrophoresis  - Protein Immunofixation Serum  - Kappa and lambda light chain  - CBC with Platelets & Differential  - Tissue transglutaminase evelyn IgA and IgG  - IgA  - Hepatic panel (Albumin, ALT, AST, Bili, Alk Phos, TP)    Persistent insomnia  She continues to use Ambien appropriately and sparingly with #30 tablets lasting her about 3 months.  No parasomnia/sleep behavior side effects.  OK to continue PRN Ambien.    Hypertension goal BP (blood pressure) < 130/80  stable/well controlled - she'll continue the amlodipine-valsartan.  - BASIC METABOLIC PANEL  - TSH with free T4 reflex  - CBC with Platelets & Differential      Counseling  Appropriate preventive services were addressed with this patient via screening, questionnaire, or discussion as appropriate for fall prevention, nutrition, physical activity, Tobacco-use cessation, social engagement, weight loss and cognition.  Checklist reviewing preventive services available has been given to the patient.  Reviewed patient's  diet, addressing concerns and/or questions.   The patient reports drinking more than 3 alcoholic drinks per day and/or more than 7 drhnks per week. The patient was counseled and given information about possible harmful effects of excessive alcohol intake.    See Patient Instructions    The longitudinal plan of care for the diagnosis(es)/condition(s) as documented were addressed during this visit. Due to the added complexity in care, I will continue to support Buffy in the subsequent management and with ongoing continuity of care.        Harish Baer is a 74 year old, presenting for the following:  Medicare Visit        3/4/2025    10:27 AM   Additional Questions   Roomed by Michell LACY         3/4/2025    10:27 AM   Patient Reported Additional Medications   Patient reports taking the following new medications Calcium       Via the Health Maintenance questionnaire, the patient has reported the following services have been completed DEXA: Veronika 2023-02-02, this information has not been sent to the abstraction team.    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  One time she got up and had numbness in her legs.  Both legs and throughout the entire legs.  No associated back pain.  The symptoms lasted less then 10 minutes and resolved after she got up and moved around.  After she started using a different chair the symptoms have not recurred.      HTN - Does not check home BP    Insomnia - uses Ambien intermittently and sparingly.  She primarily uses this when traveling.  She got #30 in August and just refilled it. She continues to find it helpful and has never had a parasomnia on it.      Advance Care Planning  Patient does not have a Health Care Directive: Patient states has Advance Directive and will bring in a copy to clinic.      3/3/2025   General Health   How would you rate your overall physical health? Excellent   Feel stress (tense, anxious, or unable to sleep) Only a little   (!)  STRESS CONCERN      3/3/2025   Nutrition   Diet: Regular (no restrictions)         3/3/2025   Exercise   Days per week of moderate/strenous exercise 4 days   Average minutes spent exercising at this level 40 min         3/3/2025   Social Factors   Frequency of gathering with friends or relatives Twice a week   Worry food won't last until get money to buy more No   Food not last or not have enough money for food? No   Do you have housing? (Housing is defined as stable permanent housing and does not include staying ouside in a car, in a tent, in an abandoned building, in an overnight shelter, or couch-surfing.) Yes   Are you worried about losing your housing? No   Lack of transportation? No   Unable to get utilities (heat,electricity)? No         3/3/2025   Fall Risk   Fallen 2 or more times in the past year? No   Trouble with walking or balance? No          3/3/2025   Activities of Daily Living- Home Safety   Needs help with the following daily activites None of the above   Safety concerns in the home None of the above         3/3/2025   Dental   Dentist two times every year? Yes         3/3/2025   Hearing Screening   Hearing concerns? None of the above         3/3/2025   Driving Risk Screening   Patient/family members have concerns about driving No         3/3/2025   General Alertness/Fatigue Screening   Have you been more tired than usual lately? No         3/3/2025   Urinary Incontinence Screening   Bothered by leaking urine in past 6 months No         2/10/2024   TB Screening   Were you born outside of the [place]? No         Today's PHQ-2 Score:       3/3/2025     2:22 PM   PHQ-2 ( 1999 [employer])   Q1: Little interest or pleasure in doing things 0   Q2: Feeling down, depressed or hopeless 0   PHQ-2 Score 0    Q1: Little interest or pleasure in doing things Not at all   Q2: Feeling down, depressed or hopeless Not at all   PHQ-2 Score 0       Patient-reported           3/3/2025   Substance Use   Alcohol more than 3/day  or more than 7/wk Yes   How often do you have a drink containing alcohol 4 or more times a week   How many alcohol drinks on typical day 1 or 2   How often do you have 5+ drinks at one occasion Never   Audit 2/3 Score 0   How often not able to stop drinking once started Never   How often failed to do what normally expected Never   How often needed first drink in am after a heavy drinking session Never   How often feeling of guilt or remorse after drinking Never   How often unable to remember what happened the night before Never   Have you or someone else been injured because of your drinking No   Has anyone been concerned or suggested you cut down on drinking No   TOTAL SCORE - AUDIT 4   Do you have a current opioid prescription? No   How severe/bad is pain from 1 to 10? 0/10 (No Pain)   Do you use any other substances recreationally? (!) CANNABIS PRODUCTS     Social History     Tobacco Use    Smoking status: Every Day     Current packs/day: 0.50     Average packs/day: 0.5 packs/day for 45.2 years (22.6 ttl pk-yrs)     Types: Cigarettes     Start date: 1/1/1980    Smokeless tobacco: Never   Substance Use Topics    Alcohol use: Yes     Alcohol/week: 0.0 standard drinks of alcohol     Comment: one or two cocktails/wine  four or five nights a week    Drug use: Yes     Types: Marijuana           2/14/2023   LAST FHS-7 RESULTS   1st degree relative breast or ovarian cancer No   Any relative bilateral breast cancer No   Any male have breast cancer No   Any ONE woman have BOTH breast AND ovarian cancer No   Any woman with breast cancer before 50yrs No   2 or more relatives with breast AND/OR ovarian cancer No   2 or more relatives with breast AND/OR bowel cancer No        Mammogram Screening - After age 74- determine frequency with patient based on health status, life expectancy and patient goals    ASCVD Risk   The 10-year ASCVD risk score (Josephine BELTRE, et al., 2019) is: 34.2%    Values used to calculate the  score:      Age: 74 years      Sex: Female      Is Non- : No      Diabetic: No      Tobacco smoker: Yes      Systolic Blood Pressure: 139 mmHg      Is BP treated: Yes      HDL Cholesterol: 82 mg/dL      Total Cholesterol: 224 mg/dL    Reviewed and updated as needed this visit by Provider   Tobacco  Allergies  Meds  Problems  Med Hx  Surg Hx  Fam Hx            BP Readings from Last 3 Encounters:   03/04/25 139/82   02/15/24 102/60   01/24/23 (!) 160/84    Wt Readings from Last 3 Encounters:   03/04/25 71.4 kg (157 lb 6.4 oz)   02/15/24 69.9 kg (154 lb 3.2 oz)   01/24/23 69.9 kg (154 lb 1.3 oz)                  Patient Active Problem List   Diagnosis    Carpal tunnel syndrome    Chronic insomnia    Tobacco abuse    Elevated blood pressure reading without diagnosis of hypertension    Osteopenia of both hips    Hypertension goal BP (blood pressure) < 130/80     Past Surgical History:   Procedure Laterality Date    BIOPSY      MOHS MICROGRAPHIC PROCEDURE      RELEASE CARPAL TUNNEL Right 11/15/2018    Procedure: Right Open Carpal Tunnel Release;  Surgeon: Liban Guthrie MD;  Location:  OR       Social History     Tobacco Use    Smoking status: Every Day     Current packs/day: 0.50     Average packs/day: 0.5 packs/day for 45.2 years (22.6 ttl pk-yrs)     Types: Cigarettes     Start date: 1/1/1980    Smokeless tobacco: Never   Substance Use Topics    Alcohol use: Yes     Alcohol/week: 0.0 standard drinks of alcohol     Comment: one or two cocktails/wine  four or five nights a week     Family History   Problem Relation Age of Onset    Arthritis Paternal Grandmother     Eye Disorder Paternal Grandmother     Cancer Father         mesothelioma, worked in fiber [employer] factory    Hypertension Maternal Grandmother     Diabetes Maternal Grandmother     Melanoma No family hx of     Skin Cancer No family hx of          Current providers sharing in care for this patient include:  Patient Care  "Team:  Talia Norman MD as PCP - General (Family Medicine)  Jose Laura MD as MD (Dermatology)  Talia Norman MD as Assigned PCP  Maya Ramírez PA-C as Assigned Surgical Provider    The following health maintenance items are reviewed in Epic and correct as of today:  Health Maintenance   Topic Date Due    ANNUAL REVIEW OF HM ORDERS  Never done    Pneumococcal Vaccine: 50+ Years (1 of 2 - PCV) Never done    ZOSTER IMMUNIZATION (1 of 2) Never done    LUNG CANCER SCREENING  02/14/2024    RSV VACCINE (1 - 1-dose 75+ series) Never done    INFLUENZA VACCINE (1) 09/01/2024    DEXA  02/14/2025    BMP  02/15/2025    NICOTINE/TOBACCO CESSATION COUNSELING Q 1 YR  02/15/2025    LIPID  01/22/2026    COLORECTAL CANCER SCREENING  01/30/2026    MEDICARE ANNUAL WELLNESS VISIT  03/04/2026    FALL RISK ASSESSMENT  03/04/2026    GLUCOSE  02/15/2027    ADVANCE CARE PLANNING  03/04/2030    DTAP/TDAP/TD IMMUNIZATION (3 - Td or Tdap) 11/10/2030    PHQ-2 (once per calendar year)  Completed    COVID-19 Vaccine  Completed    HPV IMMUNIZATION  Aged Out    MENINGITIS IMMUNIZATION  Aged Out    HEPATITIS C SCREENING  Discontinued    MAMMO SCREENING  Discontinued            Objective    Exam  /82   Pulse 79   Temp 97.3  F (36.3  C) (Temporal)   Resp 18   Ht 1.552 m (5' 1.1\")   Wt 71.4 kg (157 lb 6.4 oz)   SpO2 98%   BMI 29.64 kg/m     Estimated body mass index is 29.64 kg/m  as calculated from the following:    Height as of this encounter: 1.552 m (5' 1.1\").    Weight as of this encounter: 71.4 kg (157 lb 6.4 oz).    Physical Exam  GENERAL: healthy, alert and no distress  EYES: Eyes grossly normal to inspection, conjunctivae and sclerae normal  HENT: ear canals and TM's normal  NECK: no adenopathy, no asymmetry, masses, or scars and thyroid normal to palpation  RESP: lungs clear to auscultation - no rales, rhonchi or wheezes  CV: regular rate and rhythm, normal S1 S2, no S3 or S4, no murmur, click or " rub  ABDOMEN: soft, nontender, no hepatosplenomegaly, no masses  MS: no gross musculoskeletal defects noted, no edema  SKIN: no suspicious lesions or rashes  NEURO: Grossly normal strength and tone, mentation intact and speech normal  PSYCH: mentation appears normal, affect normal/bright        3/4/2025   Mini Cog   Clock Draw Score 2 Normal   3 Item Recall 3 objects recalled   Mini Cog Total Score 5              Signed Electronically by: Talia Norman MD

## 2025-03-04 NOTE — PATIENT INSTRUCTIONS
"I recommend that patients 50 and over get the Shingrix vaccine.  One in 3 adults in the [place] will get shingles and the risk increases with age.  Shingles can cause debilitating and persistent nerve pain and can increase your risk for stroke.  If you are on medicare you must get this vaccine at a pharmacy.  The second (final) dose is given 2-6 months after the first dose and cannot be given any sooner than 2 months after the first dose.  You should be aware that patients often report feeling a bit under the weather after the injection, including fatigue, malaise, and low-grade fever that may last a couple days.  Rarely patients can get a mild, shingles-like rash.   But these symptoms are much better than the Shingles disease.     You are a candidate for the new RSV vaccine.  RSV (Respiratory Syncytial Virus) is a common respiratory virus that frequently causes the \"common cold\" in healthy young adults.  Unfortunately, it can cause a severe viral pneumonia in infants and older people - especially those with other chronic medical conditions (such as diabetes, asthma, heart disease, liver and kidney disease).  If you are on medicare you must get this vaccine at a pharmacy.  The pharmacist will let you know beforehand if there is a copay and can administer the vaccine.       Patient Education   Preventive Care Advice   This is general advice given by our system to help you stay healthy. However, your care team may have specific advice just for you. Please talk to your care team about your preventive care needs.  Nutrition  Eat 5 or more servings of fruits and vegetables each day.  Try wheat bread, brown rice and whole grain pasta (instead of white bread, rice, and pasta).  Get enough calcium and vitamin D. Check the label on foods and aim for 100% of the RDA (recommended daily allowance).  Lifestyle  Exercise at least 150 minutes each week  (30 minutes a day, 5 days a week).  Do muscle strengthening activities 2 days a " [facility] CLINIC PATIENT INSTRUCTIONS    1.) Please go to the ER to be evaluated and treated for Right hand pain and swelling.    2.) Return to VAD clinic in 2 weeks.     3.) Please make the following medication changes       -DECREASE Torsemide to 20 mg by mouth daily     -Take pantoprazole 40 mg by mouth daily to prevent acid reflux and GI bleeding     -Take the correct dose of Entresto 24-26 mg = 1 tab by mouth every 12 hours     -Take docusate senna 2 tab by mouth daily at bedtime as needed for constipation    4.) Please have CBC, CMP, MG, LDH and INR drawn weekly every Monday at [facility]. Check INR: as directed by Coumadin Clinic.  All lab results should be faxed to VAD team at Fax [phone]. Please call critical results to VAD Team at Ph. [phone].    5.) Monitor daily weight, temperature and VAD parameters. Follow a No added salt, low sodium diet.     6.) Please call VAD pager at ph. [phone] and ask to speak with VAD Coordinator on call for any of the following:     -Chest pain   -Worsening shortness of breath   -Weight gain greater than 2 pounds in 1 day-OR- 5 pounds in 1 week   -ICD shock   -Palpitations, lightheadedness or dizziness   -Blood in urine or stool   -Bloody nose  -Headache, vision changes, signs of stroke which include any of the following: one sided weakness, numbness or tingling, difficulty speaking or swallowing, facial droop, confusion, disorientation, altered responsiveness     -Fever or chills, Temp greater than 100   -Signs of driveline infection which include redness, drainage or pain    -Trauma to driveline   -ANY RED or flashing yellow VAD alarms   -If you need to change to back up    -Call for VAD flows less than 3 and greater than 8   -Call for pump power increase 2 davalos above baseline     week. These help control your weight and prevent disease.  No smoking.  Wear sunscreen to prevent skin cancer.  Have a dental exam and cleaning every 6 months.  Yearly exams  See your health care team every year to talk about:  Any changes in your health.  Any medicines your care team has prescribed.  Preventive care, family planning, and ways to prevent chronic diseases.  Shots (vaccines)   HPV shots (up to age 26), if you've never had them before.  Hepatitis B shots (up to age 59), if you've never had them before.  COVID-19 shot: Get this shot when it's due.  Flu shot: Get a flu shot every year.  Tetanus shot: Get a tetanus shot every 10 years.  Pneumococcal, hepatitis A, and RSV shots: Ask your care team if you need these based on your risk.  Shingles shot (for age 50 and up)  General health tests  Diabetes screening:  Starting at age 35, Get screened for diabetes at least every 3 years.  If you are younger than age 35, ask your care team if you should be screened for diabetes.  Cholesterol test: At age 39, start having a cholesterol test every 5 years, or more often if advised.  Bone density scan (DEXA): At age 50, ask your care team if you should have this scan for osteoporosis (brittle bones).  Hepatitis C: Get tested at least once in your life.  STIs (sexually transmitted infections)  Before age 24: Ask your care team if you should be screened for STIs.  After age 24: Get screened for STIs if you're at risk. You are at risk for STIs (including HIV) if:  You are sexually active with more than one person.  You don't use condoms every time.  You or a partner was diagnosed with a sexually transmitted infection.  If you are at risk for HIV, ask about PrEP medicine to prevent HIV.  Get tested for HIV at least once in your life, whether you are at risk for HIV or not.  Cancer screening tests  Cervical cancer screening: If you have a cervix, begin getting regular cervical cancer screening tests starting at age  "21.  Breast cancer scan (mammogram): If you've ever had breasts, begin having regular mammograms starting at age 40. This is a scan to check for breast cancer.  Colon cancer screening: It is important to start screening for colon cancer at age 45.  Have a colonoscopy test every 10 years (or more often if you're at risk) Or, ask your provider about stool tests like a FIT test every year or Cologuard test every 3 years.  To learn more about your testing options, visit:   .  For help making a decision, visit:   https://bit.ly/yg27799.  Prostate cancer screening test: If you have a prostate, ask your care team if a prostate cancer screening test (PSA) at age 55 is right for you.  Lung cancer screening: If you are a current or former smoker ages 50 to 80, ask your care team if ongoing lung cancer screenings are right for you.  For informational purposes only. Not to replace the advice of your health care provider. Copyright   2023 [facility]. All rights reserved. Clinically reviewed by the [facility]. [employer] 080936 - REV 01/24.  9 Ways to Cut Back on Drinking  Maybe you've found yourself drinking more alcohol than you'd prefer. If you want to cut back, here are some ideas to try.    Think before you drink.  Do you really want a drink, or is it just a habit? If you're used to having a drink at a certain time, try doing something else then.     Look for substitutes.  Find some no-alcohol drinks that you enjoy, like flavored seltzer water, tea with honey, or tonic with a slice of lime. Or try alcohol-free beer or \"virgin\" cocktails (without the alcohol).     Drink more water.  Use water to quench your thirst. Drink a glass of water before you have any alcohol. Have another glass along with every drink or between drinks.     Shrink your drink.  For example, have a bottle of beer instead of a pint. Use a smaller glass for wine. Choose drinks with lower alcohol content (ABV%). Or " "use less liquor and more mixer in cocktails.     Slow down.  It's easy to drink quickly and without thinking about it. Pay attention, and make each drink last longer.     Do the math.  Total up how much you spend on alcohol each month. How much is that a year? If you cut back, what could you do with the money you save?     Take a break.  Choose a day or two each week when you won't drink at all. Notice how you feel on those days, physically and emotionally. How did you sleep? Do you feel better? Over time, add more break days.     Count calories.  Would you like to lose some weight? For some people that's a good motivator for cutting back. Figure out how many calories are in each drink. How many does that add up to in a day? In a week? In a month?     Practice saying no.  Be ready when someone offers you a drink. Try: \"Thanks, I've had enough.\" Or \"Thanks, but I'm cutting back.\" Or \"No, thanks. I feel better when I drink less.\"   Current as of: November 15, 2023  Content Version: 14.3    2024 [employer].   Care instructions adapted under license by your healthcare professional. If you have questions about a medical condition or this instruction, always ask your healthcare professional. [employer] disclaims any warranty or liability for your use of this information.     "

## 2025-03-05 ENCOUNTER — PATIENT OUTREACH (OUTPATIENT)
Dept: CARE COORDINATION | Facility: CLINIC | Age: 75
End: 2025-03-05
Payer: COMMERCIAL

## 2025-03-05 LAB
ALBUMIN SERPL ELPH-MCNC: 4 G/DL (ref 3.7–5.1)
ALPHA1 GLOB SERPL ELPH-MCNC: 0.3 G/DL (ref 0.2–0.4)
ALPHA2 GLOB SERPL ELPH-MCNC: 0.7 G/DL (ref 0.5–0.9)
B-GLOBULIN SERPL ELPH-MCNC: 0.7 G/DL (ref 0.6–1)
GAMMA GLOB SERPL ELPH-MCNC: 0.8 G/DL (ref 0.7–1.6)
IGA SERPL-MCNC: 122 MG/DL (ref 84–499)
KAPPA LC FREE SER-MCNC: 2.03 MG/DL (ref 0.33–1.94)
KAPPA LC FREE/LAMBDA FREE SER NEPH: 1.39 {RATIO} (ref 0.26–1.65)
LAMBDA LC FREE SERPL-MCNC: 1.46 MG/DL (ref 0.57–2.63)
LOCATION OF TASK: NORMAL
LOCATION OF TASK: NORMAL
M PROTEIN SERPL ELPH-MCNC: 0 G/DL
PROT PATTERN SERPL ELPH-IMP: NORMAL
PROT PATTERN SERPL IFE-IMP: NORMAL

## 2025-03-06 LAB
TTG IGA SER-ACNC: 0.4 U/ML
TTG IGG SER-ACNC: <0.6 U/ML

## 2025-03-06 NOTE — RESULT ENCOUNTER NOTE
Jw Baer,  Your lab results (work-up for potential secondary causes of osteoporosis) were within acceptable limits or remain unchanged from previous results. Minor highs/lows were noted which are not clinically significant.     I think we can attribute your osteoporosis to age-related changes in bone density.  Keep in mind that cigarette smoking and alcohol are both risk factors for osteoporosis - as is your family history, but that risk factor cannot be modified!    Let me know if you'd like to consider medication to slow the progression of this disease. A hip fracture can be deadly or, alternatively, leave you with permanent disability.      Talia Norman MD

## 2025-03-18 ENCOUNTER — ANCILLARY PROCEDURE (OUTPATIENT)
Dept: CT IMAGING | Facility: CLINIC | Age: 75
End: 2025-03-18
Attending: FAMILY MEDICINE
Payer: COMMERCIAL

## 2025-03-18 ENCOUNTER — ANCILLARY PROCEDURE (OUTPATIENT)
Dept: MAMMOGRAPHY | Facility: CLINIC | Age: 75
End: 2025-03-18
Attending: FAMILY MEDICINE
Payer: COMMERCIAL

## 2025-03-18 ENCOUNTER — MYC MEDICAL ADVICE (OUTPATIENT)
Dept: FAMILY MEDICINE | Facility: CLINIC | Age: 75
End: 2025-03-18

## 2025-03-18 DIAGNOSIS — Z12.31 ENCOUNTER FOR SCREENING MAMMOGRAM FOR BREAST CANCER: ICD-10-CM

## 2025-03-18 DIAGNOSIS — R91.1 SOLID NODULE OF LUNG GREATER THAN 8 MM IN DIAMETER: Primary | ICD-10-CM

## 2025-03-18 DIAGNOSIS — Z87.891 PERSONAL HISTORY OF TOBACCO USE: ICD-10-CM

## 2025-03-18 PROCEDURE — 77067 SCR MAMMO BI INCL CAD: CPT | Mod: GC | Performed by: STUDENT IN AN ORGANIZED HEALTH CARE EDUCATION/TRAINING PROGRAM

## 2025-03-18 PROCEDURE — 71271 CT THORAX LUNG CANCER SCR C-: CPT | Mod: GC | Performed by: RADIOLOGY

## 2025-03-18 PROCEDURE — 77063 BREAST TOMOSYNTHESIS BI: CPT | Mod: GC | Performed by: STUDENT IN AN ORGANIZED HEALTH CARE EDUCATION/TRAINING PROGRAM

## 2025-03-18 NOTE — RESULT ENCOUNTER NOTE
Jw Foley,  Your low-dose chest CT for lung cancer screening showed a new, large lung nodule.  This requires further evaluation and I have referred you to the lung nodule clinic at the [facility] Dept of Pulmonology. If you don't hear from a representative within 2 business days, please call [phone] to schedule that.    In addition you were noted to have moderate coronary artery calcium which corresponds with coronary artery disease.  I would like to discuss this new finding and potential treatments with you.  Please schedule a virtual visit (a scheduled video or telephone visit) with me.      The goal of screening for lung cancer with CT scans is to catch any developing lung cancer early - when treatment is most likely to be successful.  However, the most effective intervention to lower your risk of lung cancer is to be tobacco-free.   Talia Norman MD

## 2025-03-19 ENCOUNTER — TELEPHONE (OUTPATIENT)
Dept: PULMONOLOGY | Facility: CLINIC | Age: 75
End: 2025-03-19
Payer: COMMERCIAL

## 2025-03-19 ENCOUNTER — PATIENT OUTREACH (OUTPATIENT)
Dept: ONCOLOGY | Facility: CLINIC | Age: 75
End: 2025-03-19
Payer: COMMERCIAL

## 2025-03-19 DIAGNOSIS — R91.8 PULMONARY NODULES: Primary | ICD-10-CM

## 2025-03-19 NOTE — TELEPHONE ENCOUNTER
"[facility] Radiology Lung Cancer Screening CT result notification:     LDCT/Lung Cancer Screening CT Exam date: 3/18/2025  Radiologist Impression AND Recommendations:    Lung-RADS Category 4B. Very Suspicious.  Recommendation: Chest CT with or without contrast, PET/CT, and/or  tissue sampling depending on the probability of malignancy and  comorbidities. PET/CT may be used when there is a ? 8 mm (? 268.1 mm3)  solid   Pertinent Findings:  Nodules:  The largest and/or fastest 3 of these nodule(s) are as follows:     - 8 mm solid nodule in the right upper lobe on series: 4 image: 74,  new since 2/14/2023.     - 5 mm solid nodule in the left upper lobe on series: 4 image: 31,  stable.     - 4 mm solid nodule in the right lower lobe on series: 4 image: 266,  stable.     Ordering Provider: Dr. Dhruvan Deborah L Schoenholz did receive the remaining radiology results from their PCP.        Lung Nodule Program Protocol recommendation [Pertaining to lung nodules]    Lung RADS 4B/4X Protocol:  Results RN to notify Patient of results/recommendations, place referral to [facility] Lung Nodule clinic within 2 to 4 weeks.  Results RN place referral to [facility] Lung nodule program [9023 - Adult Oncology/Hematology  referral  with reason for referral \"Interventional pulmonology (Lung Nodule)\"] and Patient will be scheduled to see the Lung Nodule Specialist within 2 to 4 weeks (Yes/No/NA): Yes.    RN communicated the lung nodule finding to the patient (Yes/No):  No  The patient had the following questions: N/a  Correct letter sent as per [facility] Lung nodule protocol (Yes/No):  Yes  Did Patient have any CT's of chest previous? (Yes/No/NA) N/a  If no comparisons used, inquire if patient has had any chest CT's in the past and if so, request priors.    Zeinab Hayward RN  RN Care Coordinator  Lung Nodule/Interventional Pulmonology Clinic  [facility]  Phone: [phone]    "

## 2025-03-19 NOTE — PROGRESS NOTES
New IP (Interventional Pulmonology) referral rec'd.  Chart reviewed.       New Patient: Interventional Pulmonary (Lung nodule) Nurse Navigator Note    Referring provider: Talia Norman MDS[facility] [employer]    Referred to (specialty): Interventional Pulmonary (Lung nodule)    Requested provider (if applicable): n/a    Date Referral Received: 3/18/2025    Evaluation for:  new 8 mm nodule on LDCT (lung cancer screening CT) in patient with 22 pack-year hx tobacco use    Clinical History (per Nurse review of records provided):    **BOOK MARKED**    CT Low Dose Lung Cancer Screening     History: Personal history of tobacco use. Screening for lung cancer,  smoking.     Number of packs-year of smokin  Current or former smoker?: Current     Comparison: Lung cancer screening CT 2023.     Technique: Helical acquisition low dose CT chest. Images reviewed in  lung, soft tissue and bone windows.  DLP: 90 (mGy*cm)  CTDIvol: 2.58 (mGy)     Findings: [All follow up of nodules are based on ACR guidelines for  lung cancer screening and measurements of each nodule size must be the  mean of the longest axial plane measurement by its perpendicular  measured to the nearest decimal and rounded up to the nearest whole  number. ]  Nodules:  The largest and/or fastest 3 of these nodule(s) are as follows:     - 8 mm solid nodule in the right upper lobe on series: 4 image: 74,  new since 2023.     - 5 mm solid nodule in the left upper lobe on series: 4 image: 31,  stable.     - 4 mm solid nodule in the right lower lobe on series: 4 image: 266,  stable.     Emphysema: Trace centrilobular emphysema.     Coronary artery calcium: Moderate.     Additional findings: Similar additional scattered sub-6 mm solid  nodules, for example a 3 mm nodule in superior right lower lobe  (4/105). Bibasilar atelectasis. Subsegmental atelectasis in the  lingula and left lower lobe. No pneumothorax or pleural  effusion.  Hypoattenuating subcentimeter thyroid nodules. No pericardial  effusion. Normal heart size. Mild aortic annular calcifications. Mild  calcifications of the aorta and proximal great vessels. Mild to  moderate degenerative changes of the visualized spine.                                                                      Impression:   1. ACR Assessment Category (2022): Lung-RADS Category 4B. Very  Suspicious. New 8 mm solid nodule in the right upper lobe compared to  2/14/2023.      Recommendation: Lung-RADS Category 4B. Very Suspicious.    Records Location: [facility]     Records Needed: none    Additional testing needed prior to consult: pft's     No indicators present

## 2025-03-19 NOTE — PROGRESS NOTES
Per NPS (new patient scheduling) team:  FYI:  Pt will be out of town 3/20/25-4/1/25. I am not able to schedule sooner due to this.     Pt took the only open appt between 4/1 to 4/17

## 2025-03-25 ENCOUNTER — DOCUMENTATION ONLY (OUTPATIENT)
Dept: PULMONOLOGY | Facility: CLINIC | Age: 75
End: 2025-03-25
Payer: COMMERCIAL

## 2025-03-25 PROBLEM — R03.0 ELEVATED BLOOD PRESSURE READING WITHOUT DIAGNOSIS OF HYPERTENSION: Status: RESOLVED | Noted: 2021-12-01 | Resolved: 2025-03-25

## 2025-03-25 PROBLEM — R91.8 PULMONARY NODULES: Status: ACTIVE | Noted: 2025-03-25

## 2025-03-25 RX ORDER — COVID-19 VACCINE, MRNA 50 UG/.5ML
INJECTION, SUSPENSION INTRAMUSCULAR
COMMUNITY
Start: 2024-09-23

## 2025-03-25 NOTE — NURSING NOTE
Pre-visit planning and chart review completed.     NEW patient appointment:    4/18 with Dr. Dk Weber  3/18 CT chest wo contrast  4/3 PFTs    - Current smoker.    CE updated. Medications, allergies, problem list, and immunizations reconciled.

## 2025-03-26 NOTE — TELEPHONE ENCOUNTER
RECORDS STATUS - ALL OTHER DIAGNOSIS      RECORDS RECEIVED FROM: [facility]   NOTES STATUS DETAILS   OFFICE NOTE from referring provider Epic 3/4/25: Dr. Talia Norman   MEDICATION LIST [facility]    LABS     ANYTHING RELATED TO DIAGNOSIS Epic Most recent 3/4/25   IMAGING (NEED IMAGES & REPORT)     CT SCANS PACS 3/18/25, 2/14/23: CT Chest

## 2025-04-09 ENCOUNTER — VIRTUAL VISIT (OUTPATIENT)
Dept: FAMILY MEDICINE | Facility: CLINIC | Age: 75
End: 2025-04-09
Payer: COMMERCIAL

## 2025-04-09 DIAGNOSIS — E04.2 MULTIPLE THYROID NODULES: ICD-10-CM

## 2025-04-09 DIAGNOSIS — E78.00 PURE HYPERCHOLESTEROLEMIA: ICD-10-CM

## 2025-04-09 DIAGNOSIS — I25.10 CORONARY ARTERY CALCIFICATION SEEN ON CT SCAN: Primary | ICD-10-CM

## 2025-04-09 PROCEDURE — 98006 SYNCH AUDIO-VIDEO EST MOD 30: CPT | Performed by: FAMILY MEDICINE

## 2025-04-09 RX ORDER — ASPIRIN 81 MG/1
81 TABLET ORAL DAILY
Status: SHIPPED
Start: 2025-04-09

## 2025-04-09 RX ORDER — ROSUVASTATIN CALCIUM 5 MG/1
5 TABLET, COATED ORAL DAILY
Qty: 90 TABLET | Refills: 0 | Status: SHIPPED | OUTPATIENT
Start: 2025-04-09

## 2025-04-09 NOTE — PATIENT INSTRUCTIONS
Start rosuvastatin once daily and schedule a fasting lab-only appointment in 6-8 weeks.      Start taking a daily low-dose (81 mg) aspirin daily.  Don't start this until you complete any procedures for the lung nodule.

## 2025-04-09 NOTE — PROGRESS NOTES
Buffy is a 74 year old who is being evaluated via a billable video visit.    How would you like to obtain your AVS? MyChart  If the video visit is dropped, the invitation should be resent by: Text to cell phone: [phone]  Will anyone else be joining your video visit? No      Assessment & Plan     Coronary artery calcification seen on CT scan  Moderate calcium noted incidentally on LDCT.  I recommended initiating statin and ASA.  Discussed rationale for this with patient.  She is agreeable.  I recommended she hold off on starting the ASA until she knows if she will be having a lung biopsy.  If she will be going forward with a biopsy she can start the ASA after the procedure.    - rosuvastatin (CRESTOR) 5 MG tablet  Dispense: 90 tablet; Refill: 0  - Lipid panel reflex to direct LDL Fasting  - ALT  - aspirin 81 MG EC tablet    Pure hypercholesterolemia  She'll start rosuvastatin and return to clinic in 6-8 weeks for fasting lab-only appointment.    - rosuvastatin (CRESTOR) 5 MG tablet  Dispense: 90 tablet; Refill: 0  - Lipid panel reflex to direct LDL Fasting  - ALT    Multiple thyroid nodules  Discussed that we could consider a thyroid ultrasound at a later date but that her lung nodule evaluation is higher priority at this time.       Patient Instructions   Start rosuvastatin once daily and schedule a fasting lab-only appointment in 6-8 weeks.      Start taking a daily low-dose (81 mg) aspirin daily.  Don't start this until you complete any procedures for the lung nodule.        I spent a total of 34 minutes on the day of the visit.   Time spent by me today doing chart review, history and exam, documentation and further activities per the note    The longitudinal plan of care for the diagnosis(es)/condition(s) as documented were addressed during this visit. Due to the added complexity in care, I will continue to support Buffy in the subsequent management and with ongoing continuity of care.    Talia Norman,  MD  [facility]        Harish Baer is a 74 year old, presenting for the following health issues:  RECHECK      4/9/2025    11:13 AM   Additional Questions   Roomed by Sis Allen     Video Start Time: 11:34 AM    History of Present Illness       Reason for visit:  Follow Up from Results of Lung    She eats 2-3 servings of fruits and vegetables daily.She consumes 0 sweetened beverage(s) daily.She exercises with enough effort to increase her heart rate 10 to 19 minutes per day.  She exercises with enough effort to increase her heart rate 3 or less days per week.   She is taking medications regularly.        Is scheduled to see interventional pulmonology for 8 mm nodule noted on LDCT.  Down to 4 cigs/day    Incidental findings on LDCT included trace emphysema, moderate coronary artery calcium and small (subcentimeter) thyroid nodules.    Went to [place] for a week with her daughter and notes she walking 15,000 steps daily and up and down many stairs with no symptoms - no chest pain, shortness of breath or dizziness.          Objective           Vitals:  No vitals were obtained today due to virtual visit.    Physical Exam   GENERAL: alert and no distress  EYES: Eyes grossly normal to inspection.  No discharge or erythema, or obvious scleral/conjunctival abnormalities.  RESP: No audible wheeze, cough, or visible cyanosis.    SKIN: Visible skin clear. No significant rash, abnormal pigmentation or lesions.  NEURO: Cranial nerves grossly intact.  Mentation and speech appropriate for age.  PSYCH: Appropriate affect, tone, and pace of words        Video-Visit Details    Type of service:  Video Visit   Video End Time:11:57 AM  Originating Location (pt. Location): Home  Distant Location (provider location):  Off-site  Platform used for Video Visit: Juliano  Signed Electronically by: Talia Norman MD

## 2025-04-10 ENCOUNTER — ONCOLOGY VISIT (OUTPATIENT)
Dept: PULMONOLOGY | Facility: CLINIC | Age: 75
End: 2025-04-10
Attending: FAMILY MEDICINE
Payer: COMMERCIAL

## 2025-04-10 ENCOUNTER — PRE VISIT (OUTPATIENT)
Dept: PULMONOLOGY | Facility: CLINIC | Age: 75
End: 2025-04-10
Payer: COMMERCIAL

## 2025-04-10 VITALS
HEART RATE: 75 BPM | DIASTOLIC BLOOD PRESSURE: 85 MMHG | BODY MASS INDEX: 30.23 KG/M2 | WEIGHT: 160.5 LBS | TEMPERATURE: 97.8 F | SYSTOLIC BLOOD PRESSURE: 147 MMHG | RESPIRATION RATE: 20 BRPM | OXYGEN SATURATION: 96 %

## 2025-04-10 DIAGNOSIS — R91.1 SOLID NODULE OF LUNG GREATER THAN 8 MM IN DIAMETER: ICD-10-CM

## 2025-04-10 PROCEDURE — G0463 HOSPITAL OUTPT CLINIC VISIT: HCPCS | Performed by: STUDENT IN AN ORGANIZED HEALTH CARE EDUCATION/TRAINING PROGRAM

## 2025-04-10 ASSESSMENT — PAIN SCALES - GENERAL: PAINLEVEL_OUTOF10: NO PAIN (0)

## 2025-04-10 NOTE — NURSING NOTE
"Oncology Rooming Note    April 10, 2025 6:59 AM   Deborah L Schoenholz is a 74 year old female who presents for:    Chief Complaint   Patient presents with    Oncology Clinic Visit      Solid nodule of lung     Initial Vitals: BP (!) 147/85 (BP Location: Left arm, Patient Position: Sitting, Cuff Size: Adult Regular)   Pulse 75   Temp 97.8  F (36.6  C) (Oral)   Resp 20   Wt 72.8 kg (160 lb 8 oz)   SpO2 96%   BMI 30.23 kg/m   Estimated body mass index is 30.23 kg/m  as calculated from the following:    Height as of 3/4/25: 1.552 m (5' 1.1\").    Weight as of this encounter: 72.8 kg (160 lb 8 oz). Body surface area is 1.77 meters squared.  No Pain (0) Comment: Data Unavailable   No LMP recorded. Patient is postmenopausal.  Allergies reviewed: Yes  Medications reviewed: Yes    Medications: Medication refills not needed today.  Pharmacy name entered into [employer]: [facility][id number] [address] AT [address]    Frailty Screening:   Is the patient here for a new oncology consult visit in cancer care? 1. Yes. Over the past month, have you experienced difficulty or required a caregiver to assist with:   1. Balance, walking or general mobility (including any falls)? NO  2. Completion of self-care tasks such as bathing, dressing, toileting, grooming/hygiene?  NO  3. Concentration or memory that affects your daily life?  NO     PHQ9:  Did this patient require a PHQ9?: No      Clinical concerns: none.      Vijay Elias"

## 2025-04-10 NOTE — PROGRESS NOTES
LUNG NODULE & INTERVENTIONAL PULMONARY CLINIC  [facility]    Deborah L Schoenholz MRN# [id number]   Age: 74 year old YOB: 1950     Reason for Consultation: Lung nodule     Requesting Physician: Talia Norman MD  [address]  [place] [address]     Assessment and Plan:    Deborah L Schoenholz is a 74 year old female who presents for evaluation of a right upper lobe lung nodule.    I independently reviewed their CT scan from 3/18/2025 which reveals a circular appearing 8 mm right upper lobe nodule.  This is new compared to CT from 2/14/2023.    Given the appearance, morphology, clinical history including cigarette smoking and upper lobe location I am suspicious for malignancy.    Will ask interventional radiology if they can biopsy this.  If not he may require surgical excision as this is a difficult spot to biopsy bronchoscopically.      Patient indicated understanding and agreed to the plan of care. All questions answered.     Dk Weber DO   of Medicine  Interventional Pulmonology  Department of Pulmonary, Allergy, Critical Care and Sleep Medicine   [facility]     History:  Deborah L Schoenholz is a 74 year old female who presents for evaluation of a lung nodule.   Cough? yes, chronic.   Shortness of breath? No  Smoking history: smokes 1/2 ppd for 30 years.   Prior history of lung problems: No  Family history: Yes, dad had lung cancer.   Exposure to fungus/mold: No  Exposure to tuberculosis: No  Radon exposure: no  Asbestos exposure: no  Immunosuppressed? No  Difficulty swallowing? No  Weight loss? No  Night sweats? No  Fever or chills? No    Medications:  Current Outpatient Medications   Medication Sig Dispense Refill    amLODIPine-valsartan (EXFORGE) 5-160 MG tablet Take 0.5 tablets by mouth daily 45 tablet 3    ascorbic acid (VITAMIN C) 500 MG tablet Take 1 tablet by mouth daily.      aspirin 81 MG EC  tablet Take 1 tablet (81 mg) by mouth daily.      Melatonin 10 MG TABS tablet Take 1 tablet (10 mg) by mouth nightly as needed for sleep.      multivitamin (CENTRUM SILVER) tablet       rosuvastatin (CRESTOR) 5 MG tablet Take 1 tablet (5 mg) by mouth daily. 90 tablet 0    SPIKEVAX 50 MCG/0.5ML injection       UNKNOWN MED DOSAGE OTC calcium supplement      zolpidem (AMBIEN) 5 MG tablet Take 1 tablet (5 mg) by mouth nightly as needed for sleep. 30 tablet 0     No current facility-administered medications for this visit.         Physical exam:  BP (!) 147/85 (BP Location: Left arm, Patient Position: Sitting, Cuff Size: Adult Regular)   Pulse 75   Temp 97.8  F (36.6  C) (Oral)   Resp 20   Wt 72.8 kg (160 lb 8 oz)   SpO2 96%   BMI 30.23 kg/m    Wt Readings from Last 4 Encounters:   04/10/25 72.8 kg (160 lb 8 oz)   03/04/25 71.4 kg (157 lb 6.4 oz)   02/15/24 69.9 kg (154 lb 3.2 oz)   01/24/23 69.9 kg (154 lb 1.3 oz)     General: Well appearing, nonlabored breathing  Neuro: Answering questions appropriately  Psych: Normal affect

## 2025-04-10 NOTE — CONSULTS
Outpatient IR Referral  04/10/25    Referring Provider: Dr. Weber    IR Referral Request: Lung Nodule Biopsy     Recommendations/Plan:  Patient approved for CT guided lung nodule biopsy with IR staff Dr. Bass. This procedure will need to be coordinated with IR staff Dr. Bass due to small nodule size and challenging biopsy approach.     See CT Chest from 3/18/2025 series 4 image 74.    Surg path entered under referrer.    Case and imaging was reviewed with Dr. Elbert Bass MD.  IR recommendations also relayed Epic messaging.    IR referral converted to procedure order.      Brief History:    Deborah L Schoenholz is a 74 year old female with history of right upper lobe lung nodule. CT scan from 3/18/2025 which reveals a circular appearing 8 mm right upper lobe nodule.  This is new compared to CT from 2/14/2023. Patient has a smoking history and nodule appearance is concerning for malignancy.    Pertinent Medications:    Current Outpatient Medications   Medication Sig Dispense Refill    amLODIPine-valsartan (EXFORGE) 5-160 MG tablet Take 0.5 tablets by mouth daily 45 tablet 3    ascorbic acid (VITAMIN C) 500 MG tablet Take 1 tablet by mouth daily.      aspirin 81 MG EC tablet Take 1 tablet (81 mg) by mouth daily.      Melatonin 10 MG TABS tablet Take 1 tablet (10 mg) by mouth nightly as needed for sleep.      multivitamin (CENTRUM SILVER) tablet       rosuvastatin (CRESTOR) 5 MG tablet Take 1 tablet (5 mg) by mouth daily. 90 tablet 0    SPIKEVAX 50 MCG/0.5ML injection       UNKNOWN MED DOSAGE OTC calcium supplement      zolpidem (AMBIEN) 5 MG tablet Take 1 tablet (5 mg) by mouth nightly as needed for sleep. 30 tablet 0     No current facility-administered medications for this visit.       Pertinent Imaging Reviewed:          Most Recent Labs:  Lab Results   Component Value Date    WBC 5.8 03/04/2025    WBC 13.8 09/21/2009     Lab Results   Component Value Date    RBC 4.18 03/04/2025    RBC 4.35  "09/21/2009     Lab Results   Component Value Date    HGB 12.8 03/04/2025    HGB 13.6 01/06/2011     Lab Results   Component Value Date    HCT 40.3 03/04/2025    HCT 39.4 09/21/2009     Lab Results   Component Value Date     03/04/2025     09/21/2009    Last Comprehensive Metabolic Panel:  Lab Results   Component Value Date     03/04/2025    POTASSIUM 4.0 03/04/2025    CHLORIDE 103 03/04/2025    CO2 27 03/04/2025    ANIONGAP 10 03/04/2025    GLC 93 03/04/2025    BUN 21.1 03/04/2025    CR 0.59 03/04/2025    GFRESTIMATED >90 03/04/2025    HECTOR 9.5 03/04/2025      No results found for: \"INR\"       If requesting team would like sample sent for anything else please use the IR order set \"IR RAD Biopsy or Fluid Aspirate Specimens\" to select your necessary diagnostic labs and pend for admission.     If there are labs you desire that are not found in this order set or you have questions regarding specific diagnostic labs please call the associated lab personnel.     Clinic visit with IR provider will be coordinated to discuss biopsy procedure, risks and benefits as needed.       Keon Lazaro PA-C  Interventional Radiology   [phone] (IR RN triage)   "

## 2025-04-11 ENCOUNTER — TELEPHONE (OUTPATIENT)
Dept: INTERVENTIONAL RADIOLOGY/VASCULAR | Facility: CLINIC | Age: 75
End: 2025-04-11
Payer: COMMERCIAL

## 2025-04-13 ENCOUNTER — MYC MEDICAL ADVICE (OUTPATIENT)
Dept: INTERVENTIONAL RADIOLOGY/VASCULAR | Facility: CLINIC | Age: 75
End: 2025-04-13
Payer: COMMERCIAL

## 2025-04-16 ENCOUNTER — VIRTUAL VISIT (OUTPATIENT)
Dept: VASCULAR SURGERY | Facility: CLINIC | Age: 75
End: 2025-04-16
Payer: COMMERCIAL

## 2025-04-16 VITALS — WEIGHT: 160 LBS | BODY MASS INDEX: 30.21 KG/M2 | HEIGHT: 61 IN

## 2025-04-16 DIAGNOSIS — R05.9 COUGH: ICD-10-CM

## 2025-04-16 DIAGNOSIS — R91.1 NODULE OF RIGHT LUNG: Primary | ICD-10-CM

## 2025-04-16 DIAGNOSIS — Z72.0 TOBACCO ABUSE: ICD-10-CM

## 2025-04-16 ASSESSMENT — PAIN SCALES - GENERAL: PAINLEVEL_OUTOF10: NO PAIN (0)

## 2025-04-16 NOTE — NURSING NOTE
Current patient location: [address] [place], [place] [address] [facility] [address]    Is the patient currently in the state of [place]? YES    Visit mode: VIDEO    If the visit is dropped, the patient can be reconnected by:VIDEO VISIT: Text to cell phone:   Telephone Information:   Mobile [phone]    and VIDEO VISIT: Send to e-mail at: [email or web address]    Will anyone else be joining the visit? NO  (If patient encounters technical issues they should call [phone])    Are changes needed to the allergy or medication list? No    Patient denies any changes since echeck-in completion and states all information entered during echeck-in remains accurate.    Are refills needed on medications prescribed by this physician? NA    Rooming Documentation:  Questionnaire(s) completed    No other vitals to report today    Reason for visit: Consult    Jose Fang MA VVF

## 2025-04-16 NOTE — PROGRESS NOTES
INTERVENTIONAL RADIOLOGY VIRTUAL VISIT        Patient Name:  Deborah L Schoenholz  Date of Visit: 4/16/2025  Referring Provider: Dr. Weber  REASON FOR VISIT:  To discuss CT guided biopsy on 4/17/25    Virtual Visit Details  Type of service:  Video Visit   Joined the call at 4/16/2025, 9:57:01 am.  Left the call at 4/16/2025, 10:28:53 am.  You were on the call for 31 minutes 51 seconds.  Originating Location (pt. Location): Home    Distant Location (provider location):  On-site  Platform used for Video Visit: Juliano        ASSESSMENT:  Summary: 74 year old female with a PMH of carpal tunnel syndrome, pulmonary nodules, thyroid nodules, HTN, osteopenia, tobacco abuse, and chronic insomnia who presents for discussion of a right upper lobe lung nodule biopsy.     #Right upper lobe lung nodule: This will be scheduled with Dr. Bass, plan for 8mm right upper lobe nodule biopsy, see CT Chest from 3/18/2025 series 4 image 74. Pt has a hx of smoking  #Tobacco abuse: Per report, patient has cut down from 10 cigarettes to 4 cigarettes. She started smoking in her 30s.   #Cough: She has a chronic cough that she has had since she started smoking that is intermittent.     PLAN:  -Patient has ASA listed on her medication list, however, she has not started it yet. I have asked her to hold off on taking this until after the biopsy.  -The patient's medical data, including pertinent imaging, was reviewed.    -Education was given on the planned procedure and why it was requested, including a detailed description of interventional radiology's role.    -The use of moderate sedation was reviewed.    -Do not recommend flying in airplane for 48-72 hours due to risk of delayed pneumothorax  -Biopsy procedure along with risks, benefits of the procedure and sedation as well as how pathology results will be communicated have been discussed with the patient.   Risk of lung biopsy was discussed which includes but is not limited to post  procedure pain, bleeding that may require blood transfusion or procedures to stop the bleeding, infection that may require treatment with medications, injury to adjacent nerves, air embolism, seeding of biopsy tract, vessels or organ systems, non diagnostic sample that may require repeat biopsy, injury to the lung that may require chest tube placement and overnight admission with potential for specialist consultation/intervention and the possibility of death.   -Patient is encouraged to pack overnight bag in the event that admission to hospital is needed.    -No further imaging will be necessary prior to the procedure.  -Updated labs can be checked the day of the procedure.    -All questions and concerns are addressed.  Patient verbalized understanding of procedure and instructions.   Formal written consent to be obtained the day of procedure.       Josy Rizvi PA-C  Interventional Radiology  IR Nurse Triage: [phone]  The IR outpatient  and can be reached at [phone].     ========================================================================    HISTORY OF PRESENT ILLNESS:  Deborah L Schoenholz is a 74 year old female with a PMH of carpal tunnel syndrome, pulmonary nodules, thyroid nodules, HTN, osteopenia, tobacco abuse, and chronic insomnia who presents for discussion of a right upper lobe lung nodule biopsy. Patient's biopsy is scheduled for 4/17/25.     Briefly, patient was referred by Dr. Weber (from interventional pulmonology following a CT scan from 3/18/2025 which revealed a circular appearing 8 mm right upper lobe nodule which was new compared to CT from 2/14/2023. Due to the appearance and morphology, IR has been asked to biopsy the lesion. Dr. Weber noted that if unable to biopsy, may require surgical excision given it would be a difficult spot to biopsy bronchoscopically.     Pt is able to lay flat, on side or prone. She has had a smokers cough that is chronic. Denies any chest  pain, SOB, cough, bleeding history, hemoptysis, history of issues with fentanyl or versed, sleep apnea, asthma, issues with receiving blood transfusions.      PAST MEDICAL HISTORY:  Past Medical History:   Diagnosis Date    Basal cell carcinoma     Breast disorder 2005    benign small mass    Carpal tunnel syndrome     Chronic insomnia 11/13/2003    Hypertension goal BP (blood pressure) < 130/80 04/03/2023    Started amlodipine-benazepril 4/2023    Nicotine addiction     Osteopenia of both hips 02/19/2023    DEXA 2/2023: lowest T = -1.5 (indira fem necks).  By FRAX tool (http://www.shef.ac.uk/FRAX/), 10-year fracture risk calculates to 11.1% for any pathologic fracture (significant if > 20%) and 3.2% for hip fracture (significant if > 3%).      Problems with sexual function         PAST SURGICAL HISTORY:  Past Surgical History:   Procedure Laterality Date    BIOPSY      MOHS MICROGRAPHIC PROCEDURE      RELEASE CARPAL TUNNEL Right 11/15/2018    Procedure: Right Open Carpal Tunnel Release;  Surgeon: Liban Guthrie MD;  Location:  OR       PAST SOCIAL HISTORY  Social History     Socioeconomic History    Marital status:      Spouse name: Not on file    Number of children: Not on file    Years of education: Not on file    Highest education level: Not on file   Occupational History    Not on file   Tobacco Use    Smoking status: Every Day     Current packs/day: 0.50     Average packs/day: 0.5 packs/day for 45.3 years (22.6 ttl pk-yrs)     Types: Cigarettes     Start date: 1/1/1980    Smokeless tobacco: Never   Vaping Use    Vaping status: Not on file   Substance and Sexual Activity    Alcohol use: Yes     Alcohol/week: 10.0 standard drinks of alcohol     Types: 10 Standard drinks or equivalent per week    Drug use: Yes     Types: Marijuana    Sexual activity: Never     Partners: Male   Other Topics Concern    Parent/sibling w/ CABG, MI or angioplasty before 65F 55M? No   Social History Narrative    How much  exercise per week? Daily walking and gardening 2x's wkly     How much calcium per day? supplement       How much caffeine per day? 3 cups coffee    How much vitamin D per day? supplement    Do you/your family wear seatbelts?  Yes    Do you/your family use safety helmets? No    Do you/your family use sunscreen? No    Do you/your family keep firearms in the home? Yes    Do you/your family have a smoke detector(s)? Yes        Do you feel safe in your home? Yes    Has anyone ever touched you in an unwanted manner?      Explain         Ashley 3, 2014 Yovani Low LPN    Reviewed cmckim lpn 8-    Reviewed Oleksandr TALAVERA MA 09/5/2017         Social Drivers of Health     Financial Resource Strain: Low Risk  (3/3/2025)    Financial Resource Strain     Within the past 12 months, have you or your family members you live with been unable to get utilities (heat, electricity) when it was really needed?: No   Food Insecurity: Low Risk  (3/3/2025)    Food Insecurity     Within the past 12 months, did you worry that your food would run out before you got money to buy more?: No     Within the past 12 months, did the food you bought just not last and you didn t have money to get more?: No   Transportation Needs: Low Risk  (3/3/2025)    Transportation Needs     Within the past 12 months, has lack of transportation kept you from medical appointments, getting your medicines, non-medical meetings or appointments, work, or from getting things that you need?: No   Physical Activity: Sufficiently Active (3/3/2025)    Exercise Vital Sign     Days of Exercise per Week: 4 days     Minutes of Exercise per Session: 40 min   Stress: No Stress Concern Present (3/3/2025)    [employer] - Occupational Stress Questionnaire     Feeling of Stress : Only a little   Social Connections: Unknown (3/3/2025)    Social Connection and Isolation Panel [NHANES]     Frequency of Communication with Friends and Family: Not on file      Frequency of Social Gatherings with Friends and Family: Twice a week     Attends Nondenominational Services: Not on file     Active Member of Clubs or Organizations: Not on file     Attends Club or Organization Meetings: Not on file     Marital Status: Not on file   Interpersonal Safety: Low Risk  (3/4/2025)    Interpersonal Safety     Do you feel physically and emotionally safe where you currently live?: Yes     Within the past 12 months, have you been hit, slapped, kicked or otherwise physically hurt by someone?: No     Within the past 12 months, have you been humiliated or emotionally abused in other ways by your partner or ex-partner?: No   Housing Stability: Low Risk  (3/3/2025)    Housing Stability     Do you have housing? : Yes     Are you worried about losing your housing?: No       MEDICATIONS:  Current Outpatient Medications   Medication Sig Dispense Refill    amLODIPine-valsartan (EXFORGE) 5-160 MG tablet Take 0.5 tablets by mouth daily 45 tablet 3    ascorbic acid (VITAMIN C) 500 MG tablet Take 1 tablet by mouth daily.      aspirin 81 MG EC tablet Take 1 tablet (81 mg) by mouth daily.      Melatonin 10 MG TABS tablet Take 1 tablet (10 mg) by mouth nightly as needed for sleep.      multivitamin (CENTRUM SILVER) tablet       rosuvastatin (CRESTOR) 5 MG tablet Take 1 tablet (5 mg) by mouth daily. 90 tablet 0    SPIKEVAX 50 MCG/0.5ML injection       UNKNOWN MED DOSAGE OTC calcium supplement      zolpidem (AMBIEN) 5 MG tablet Take 1 tablet (5 mg) by mouth nightly as needed for sleep. 30 tablet 0     No current facility-administered medications for this visit.       ALLERGIES:   No Known Allergies    PHYSICAL EXAMINATION:  General: AAOx3. Pleasant in NAD.   HEENT: NC/AT    LABORATORY RESULTS:  Lab Results   Component Value Date    WBC 5.8 03/04/2025    WBC 13.8 09/21/2009     Lab Results   Component Value Date    HGB 12.8 03/04/2025    HGB 13.6 01/06/2011     Lab Results   Component Value Date     03/04/2025     " 09/21/2009     No results found for: \"INR\"     DIAGNOSTIC IMAGING:  CT chest lung Ca screen 3/18/25: (see images per Keon Lazaro PA-C note on 4/10/25)  Impression:   1. ACR Assessment Category (2022): Lung-RADS Category 4B. Very  Suspicious. New 8 mm solid nodule in the right upper lobe compared to  2/14/2023.       CC:  1) Referring Provider  Talia Norman MD  [address] [place] [address]  "

## 2025-04-17 ENCOUNTER — APPOINTMENT (OUTPATIENT)
Dept: GENERAL RADIOLOGY | Facility: CLINIC | Age: 75
End: 2025-04-17
Attending: STUDENT IN AN ORGANIZED HEALTH CARE EDUCATION/TRAINING PROGRAM
Payer: COMMERCIAL

## 2025-04-17 ENCOUNTER — APPOINTMENT (OUTPATIENT)
Dept: INTERVENTIONAL RADIOLOGY/VASCULAR | Facility: CLINIC | Age: 75
End: 2025-04-17
Attending: PHYSICIAN ASSISTANT
Payer: COMMERCIAL

## 2025-04-17 ENCOUNTER — HOSPITAL ENCOUNTER (OUTPATIENT)
Facility: CLINIC | Age: 75
Discharge: HOME | End: 2025-04-17
Attending: RADIOLOGY | Admitting: RADIOLOGY
Payer: COMMERCIAL

## 2025-04-17 ENCOUNTER — APPOINTMENT (OUTPATIENT)
Dept: GENERAL RADIOLOGY | Facility: CLINIC | Age: 75
End: 2025-04-17
Attending: RADIOLOGY
Payer: COMMERCIAL

## 2025-04-17 ENCOUNTER — APPOINTMENT (OUTPATIENT)
Dept: MEDSURG UNIT | Facility: CLINIC | Age: 75
End: 2025-04-17
Attending: RADIOLOGY
Payer: COMMERCIAL

## 2025-04-17 VITALS
OXYGEN SATURATION: 95 % | HEART RATE: 61 BPM | BODY MASS INDEX: 29.46 KG/M2 | TEMPERATURE: 97.8 F | RESPIRATION RATE: 16 BRPM | SYSTOLIC BLOOD PRESSURE: 104 MMHG | DIASTOLIC BLOOD PRESSURE: 60 MMHG | WEIGHT: 155.9 LBS

## 2025-04-17 DIAGNOSIS — R91.1 SOLID NODULE OF LUNG GREATER THAN 8 MM IN DIAMETER: ICD-10-CM

## 2025-04-17 LAB
ERYTHROCYTE [DISTWIDTH] IN BLOOD BY AUTOMATED COUNT: 13.2 % (ref 10–15)
HCT VFR BLD AUTO: 45.6 % (ref 35–47)
HGB BLD-MCNC: 15 G/DL (ref 11.7–15.7)
INR PPP: 0.93 (ref 0.85–1.15)
MCH RBC QN AUTO: 31.3 PG (ref 26.5–33)
MCHC RBC AUTO-ENTMCNC: 32.9 G/DL (ref 31.5–36.5)
MCV RBC AUTO: 95 FL (ref 78–100)
PLATELET # BLD AUTO: 307 10E3/UL (ref 150–450)
RBC # BLD AUTO: 4.79 10E6/UL (ref 3.8–5.2)
WBC # BLD AUTO: 9 10E3/UL (ref 4–11)

## 2025-04-17 PROCEDURE — 71045 X-RAY EXAM CHEST 1 VIEW: CPT | Mod: 26 | Performed by: RADIOLOGY

## 2025-04-17 PROCEDURE — 71045 X-RAY EXAM CHEST 1 VIEW: CPT

## 2025-04-17 PROCEDURE — C1889 IMPLANT/INSERT DEVICE, NOC: HCPCS

## 2025-04-17 PROCEDURE — 250N000011 HC RX IP 250 OP 636: Mod: JZ | Performed by: RADIOLOGY

## 2025-04-17 PROCEDURE — 99152 MOD SED SAME PHYS/QHP 5/>YRS: CPT | Mod: GC | Performed by: RADIOLOGY

## 2025-04-17 PROCEDURE — 88305 TISSUE EXAM BY PATHOLOGIST: CPT | Mod: 26 | Performed by: STUDENT IN AN ORGANIZED HEALTH CARE EDUCATION/TRAINING PROGRAM

## 2025-04-17 PROCEDURE — 85610 PROTHROMBIN TIME: CPT | Performed by: RADIOLOGY

## 2025-04-17 PROCEDURE — 250N000009 HC RX 250

## 2025-04-17 PROCEDURE — 85014 HEMATOCRIT: CPT | Performed by: RADIOLOGY

## 2025-04-17 PROCEDURE — 36415 COLL VENOUS BLD VENIPUNCTURE: CPT | Performed by: RADIOLOGY

## 2025-04-17 PROCEDURE — 272N000506 HC NEEDLE CR6

## 2025-04-17 PROCEDURE — 999N000065 XR CHEST 1 VIEW

## 2025-04-17 PROCEDURE — 32408 CORE NDL BX LNG/MED PERQ: CPT | Mod: GC | Performed by: RADIOLOGY

## 2025-04-17 PROCEDURE — 999N000142 HC STATISTIC PROCEDURE PREP ONLY

## 2025-04-17 PROCEDURE — 96372 THER/PROPH/DIAG INJ SC/IM: CPT

## 2025-04-17 PROCEDURE — 999N000065 XR CHEST PORT 1 VIEW

## 2025-04-17 PROCEDURE — 999N000134 HC STATISTIC PP CARE STAGE 3

## 2025-04-17 PROCEDURE — 88333 PATH CONSLTJ SURG CYTO XM 1: CPT | Mod: 26 | Performed by: STUDENT IN AN ORGANIZED HEALTH CARE EDUCATION/TRAINING PROGRAM

## 2025-04-17 PROCEDURE — 88333 PATH CONSLTJ SURG CYTO XM 1: CPT | Mod: TC | Performed by: PHYSICIAN ASSISTANT

## 2025-04-17 PROCEDURE — 99152 MOD SED SAME PHYS/QHP 5/>YRS: CPT

## 2025-04-17 PROCEDURE — 250N000013 HC RX MED GY IP 250 OP 250 PS 637

## 2025-04-17 RX ORDER — LIDOCAINE 40 MG/G
CREAM TOPICAL
Status: DISCONTINUED | OUTPATIENT
Start: 2025-04-17 | End: 2025-04-17 | Stop reason: HOSPADM

## 2025-04-17 RX ORDER — LIDOCAINE HYDROCHLORIDE 10 MG/ML
1-30 INJECTION, SOLUTION EPIDURAL; INFILTRATION; INTRACAUDAL; PERINEURAL
Status: COMPLETED | OUTPATIENT
Start: 2025-04-17 | End: 2025-04-17

## 2025-04-17 RX ORDER — FLUMAZENIL 0.1 MG/ML
0.2 INJECTION, SOLUTION INTRAVENOUS
Status: DISCONTINUED | OUTPATIENT
Start: 2025-04-17 | End: 2025-04-17 | Stop reason: HOSPADM

## 2025-04-17 RX ORDER — NALOXONE HYDROCHLORIDE 0.4 MG/ML
0.4 INJECTION, SOLUTION INTRAMUSCULAR; INTRAVENOUS; SUBCUTANEOUS
Status: DISCONTINUED | OUTPATIENT
Start: 2025-04-17 | End: 2025-04-17 | Stop reason: HOSPADM

## 2025-04-17 RX ORDER — FENTANYL CITRATE 50 UG/ML
25-50 INJECTION, SOLUTION INTRAMUSCULAR; INTRAVENOUS EVERY 5 MIN PRN
Status: DISCONTINUED | OUTPATIENT
Start: 2025-04-17 | End: 2025-04-17 | Stop reason: HOSPADM

## 2025-04-17 RX ORDER — NALOXONE HYDROCHLORIDE 0.4 MG/ML
0.2 INJECTION, SOLUTION INTRAMUSCULAR; INTRAVENOUS; SUBCUTANEOUS
Status: DISCONTINUED | OUTPATIENT
Start: 2025-04-17 | End: 2025-04-17 | Stop reason: HOSPADM

## 2025-04-17 RX ORDER — ACETAMINOPHEN 650 MG/1
650 SUPPOSITORY RECTAL EVERY 4 HOURS PRN
Status: DISCONTINUED | OUTPATIENT
Start: 2025-04-17 | End: 2025-04-17 | Stop reason: HOSPADM

## 2025-04-17 RX ORDER — ACETAMINOPHEN 325 MG/1
650 TABLET ORAL EVERY 4 HOURS PRN
Status: DISCONTINUED | OUTPATIENT
Start: 2025-04-17 | End: 2025-04-17 | Stop reason: HOSPADM

## 2025-04-17 RX ADMIN — MIDAZOLAM 1 MG: 1 INJECTION INTRAMUSCULAR; INTRAVENOUS at 12:21

## 2025-04-17 RX ADMIN — ACETAMINOPHEN 650 MG: 325 TABLET, FILM COATED ORAL at 15:25

## 2025-04-17 RX ADMIN — FENTANYL CITRATE 50 MCG: 50 INJECTION, SOLUTION INTRAMUSCULAR; INTRAVENOUS at 12:31

## 2025-04-17 RX ADMIN — FENTANYL CITRATE 50 MCG: 50 INJECTION, SOLUTION INTRAMUSCULAR; INTRAVENOUS at 12:19

## 2025-04-17 RX ADMIN — FENTANYL CITRATE 50 MCG: 50 INJECTION, SOLUTION INTRAMUSCULAR; INTRAVENOUS at 12:25

## 2025-04-17 RX ADMIN — LIDOCAINE HYDROCHLORIDE 6 ML: 10 INJECTION, SOLUTION EPIDURAL; INFILTRATION; INTRACAUDAL; PERINEURAL at 12:51

## 2025-04-17 RX ADMIN — MIDAZOLAM 1 MG: 1 INJECTION INTRAMUSCULAR; INTRAVENOUS at 12:31

## 2025-04-17 RX ADMIN — MIDAZOLAM 1 MG: 1 INJECTION INTRAMUSCULAR; INTRAVENOUS at 12:25

## 2025-04-17 ASSESSMENT — ACTIVITIES OF DAILY LIVING (ADL)
ADLS_ACUITY_SCORE: 43

## 2025-04-17 NOTE — PROCEDURES
[facility]    Procedure: RUL nodule biopsy    Date/Time: 4/17/2025 1:05 PM    Performed by: Raoul Arechiga MD  Authorized by: Elbert Bass MD  IR Fellow Physician:    Pre Procedure Diagnosis: RUL nodule  Post Procedure Diagnosis: RUL nodule    UNIVERSAL PROTOCOL   Site Marked: NA  Prior Images Obtained and Reviewed:  Yes  Required items: Required blood products, implants, devices and special equipment available    Patient identity confirmed:  Arm band, provided demographic data, hospital-assigned identification number and verbally with patient  Patient was reevaluated immediately before administering moderate or deep sedation or anesthesia  Confirmation Checklist:  Correct equipment/implants were available, procedure was appropriate and matched the consent or emergent situation, relevant allergies and patient's identity using two indicators  Time out: Immediately prior to the procedure a time out was called    Universal Protocol: the Joint Commission Universal Protocol was followed    Preparation: Patient was prepped and draped in usual sterile fashion       ANESTHESIA    Anesthesia:  Local infiltration  Local Anesthetic:  Lidocaine 1% without epinephrine      SEDATION  Patient Sedated: Yes    Sedation Type:  Moderate (conscious) sedation  Vital signs: Vital signs monitored during sedation    See dictated procedure note for full details.  Findings: 5 cores    Specimens: core needle biopsy specimens sent for pathological analysis    Procedural Complications: None    Condition: Stable    Plan: Bed rest for 2 hr  Chest xray at 2hr      PROCEDURE    Patient Tolerance:  Patient tolerated the procedure well with no immediate complications  Length of time physician/provider present for 1:1 monitoring during sedation:  23-37 min   32 min

## 2025-04-17 NOTE — PROGRESS NOTES
A/P: Pt developed small right apical pneumothorax on 2 hr post biopsy chest xray. She endorses no chest pain or shortness of breath. Dressing CDI. She sats at 95% on room air. Small right pneumothorax is stable on 3 hr post biopsy chest xray.    - Discharge home  - Instruction to return to ED if pt develops shortness of breath or chest pain.    Raoul Arechiga MD

## 2025-04-17 NOTE — PROGRESS NOTES
Pt arrived to 2A from home for Lung Biopsy. VSS. Denies pain. Consent obtained. Awaiting for lab to result. H&P current. Appropriately NPO. Prep completed. Daughter at bedside; will be transporting patient to home.

## 2025-04-17 NOTE — PROGRESS NOTES
Pt discharged from 2A via ambulation accompanied by . VSS. KAYLYN. Denies DOA. Biopsy site CDI and covered with tegederm. IV removed. Discharge instruction provided. Question answered.  will be transporting pt home.

## 2025-04-17 NOTE — PROGRESS NOTES
Patient Name: Deborah L Schoenholz  Medical Record Number: [id number]  Today's Date: 4/17/2025    Procedure: CT guided lung biopsy  Proceduralist: Dr Kali DEWITT, Dr Hawk DEWITT  Pathology present: Yes    Procedure Start: 1228  Procedure end: 1300  Sedation medications administered: Midazolam 3 mg, Fentanyl 150 mcg     Report given to:   RN  : N/A    Other Notes: Pt arrived to IR room CT2 from . Consent reviewed. Pt denies any questions or concerns regarding procedure. Pt positioned supine and monitored per protocol. 5 cores obtained and sent to lab as ordered.  Biosentry used to close biopsy needle track.  Hemastatis achieved.  Patient to be on bedrest for 2 hours.  Pt tolerated procedure without any noted complications. Pt transferred back to .

## 2025-04-17 NOTE — PRE-PROCEDURE
GENERAL PRE-PROCEDURE:   Procedure:  Right upper lobe nodule biopsy  Date/Time:  4/17/2025 10:57 AM    Written consent obtained?: Yes    Risks and benefits: Risks, benefits and alternatives were discussed    Consent given by:  Patient  Patient states understanding of procedure being performed: Yes    Patient's understanding of procedure matches consent: Yes    Procedure consent matches procedure scheduled: Yes    Expected level of sedation:  Moderate  Appropriately NPO:  Yes  ASA Class:  2  Mallampati  :  Grade 1- soft palate, uvula, tonsillar pillars, and posterior pharyngeal wall visible  Lungs:  Lungs clear with good breath sounds bilaterally  Heart:  Normal heart sounds and rate  History & Physical reviewed:  History and physical reviewed and no updates needed  Statement of review:  I have reviewed the lab findings, diagnostic data, medications, and the plan for sedation

## 2025-04-17 NOTE — PROGRESS NOTES
Pt arrived to 2A s/p lung biopsy via litter accompanied by RN. SERINA. RA. Denies pain. R chest biopsy site CDI and covered with tegederm. Will get chest xray in two hours. Pt tolerating oral intake. Nurse will continue to monitor.

## 2025-04-17 NOTE — DISCHARGE INSTRUCTIONS
[facility]    Interventional Radiology  Patient Instructions Following Biopsy    AFTER YOU GO HOME  If you were given sedation, for the first 24 hours: we recommend that an adult stay with you, DO NOT drive or operate machinery at home or at work, DO NOT smoke, DO NOT make any important or legal decisions.  DO NOT drink alcoholic beverages the day of your procedure  Drink plenty of fluids   Resume your regular diet, unless otherwise instructed by your Primary Physician  Relax and take it easy for 48 hours  DO NOT do any strenuous exercise or lifting (> 10 lbs) for at least 3 days following your procedure  Keep the dressing dry and in place for 24 hours. Replace with Band aid for 2 days.  Never leave a wet dressing in place.  Do not take a shower for at least 12 hours following your procedure. No tub bath, hot tub, or swimming for 5 days.  There should be minimum drainage from the biopsy site    CALL THE PHYSICIAN IF:  You start bleeding from the procedure site.  If you do start to bleed from that site, lie down flat and hold pressure on the site for a minimum of 10 minutes.  Your physician will tell you if you need to return to the hospital  You develop nausea or vomiting  You have excessive swelling, redness, or tenderness at the site  You have drainage that looks like it is infected.  You experience severe pain  You develop hives or a rash or unexplained itching  You develop shortness of breath  You develop a temperature of 101 degrees F or greater    ADDITIONAL INSTRUCTIONS  If you are taking Coumadin, restart tonight.  Follow up with your Coumadin Clinic or Primary Care MD to have your INR rechecked.    [facility] INTERVENTIONAL RADIOLOGY DEPARTMENT  Procedure Physician: Dr. Arechiga                                Date of procedure: April 17, 2025  Telephone Numbers: [phone]      Monday-Friday 7:30 am to 4:00 pm  [phone] After 4:00 pm Monday-Friday, Weekends & Holidays. Option #4 for the  , and then ask for the Interventional Radiologist on call.  Someone is on call 24 hrs/day  [facility] toll free number: [phone] Monday-Friday 8:00 am to 4:30 pm  [facility] Emergency Dept: [phone]

## 2025-04-18 LAB
PATH REPORT.COMMENTS IMP SPEC: NORMAL
PATH REPORT.FINAL DX SPEC: NORMAL
PATH REPORT.GROSS SPEC: NORMAL
PATH REPORT.MICROSCOPIC SPEC OTHER STN: NORMAL
PATH REPORT.RELEVANT HX SPEC: NORMAL
PHOTO IMAGE: NORMAL

## 2025-04-21 DIAGNOSIS — R91.1 SOLID NODULE OF LUNG GREATER THAN 8 MM IN DIAMETER: Primary | ICD-10-CM

## 2025-05-22 ENCOUNTER — RESULTS FOLLOW-UP (OUTPATIENT)
Dept: FAMILY MEDICINE | Facility: CLINIC | Age: 75
End: 2025-05-22

## 2025-05-22 ENCOUNTER — LAB (OUTPATIENT)
Dept: LAB | Facility: CLINIC | Age: 75
End: 2025-05-22
Attending: FAMILY MEDICINE
Payer: COMMERCIAL

## 2025-05-22 DIAGNOSIS — E78.00 PURE HYPERCHOLESTEROLEMIA: ICD-10-CM

## 2025-05-22 DIAGNOSIS — I25.10 CORONARY ARTERY CALCIFICATION SEEN ON CT SCAN: ICD-10-CM

## 2025-05-22 LAB
ALT SERPL W P-5'-P-CCNC: 18 U/L (ref 0–50)
CHOLEST SERPL-MCNC: 201 MG/DL
FASTING STATUS PATIENT QL REPORTED: YES
HDLC SERPL-MCNC: 69 MG/DL
LDLC SERPL CALC-MCNC: 107 MG/DL
NONHDLC SERPL-MCNC: 132 MG/DL
TRIGL SERPL-MCNC: 124 MG/DL

## 2025-05-22 NOTE — RESULT ENCOUNTER NOTE
Hello!  Your cholesterol is about the same as 4 years ago, and everything is basically at goal. Please keep taking your rosuvastatin as you have been doing. Your primary care physician is out of the office today, but will get back to you if there is any additional information she'd like you to know.  Sincerely,  Dr. Анна Bautista MD  5/22/2025

## 2025-05-30 RX ORDER — ROSUVASTATIN CALCIUM 10 MG/1
10 TABLET, COATED ORAL DAILY
Qty: 90 TABLET | Refills: 0 | Status: SHIPPED | OUTPATIENT
Start: 2025-05-30

## 2025-05-30 NOTE — RESULT ENCOUNTER NOTE
Jw Baer,  I'm back in town and am reviewing your labs.  This shows the rosuvastatin has lowered your cholesterol some but our goal is to get the LDL (bad) cholesterol level under 100 (preferably closer to 70).  I would like you to increase the dose to 10 mg daily. (This is still a low dose.)  You can start the new dose now by taking 2 of your 5 mg tablets together once daily.  But I also sent in a new prescription for your for rosuvastatin 10 mg tablets (to the [facility]).      After you've been on rosuvastatin 10 mg daily for 6-8 weeks please schedule another lab-only appointment.  Fast for 10 hours prior to labs: nothing to eat or drink except plain water and your pills for ten hours prior to appointment.  In addition, avoid fatty foods and alcohol for 24 hours prior to appointment. I think the 10 mg dose will hit the richard.  Talia Norman MD

## 2025-07-01 DIAGNOSIS — I10 HYPERTENSION GOAL BP (BLOOD PRESSURE) < 130/80: ICD-10-CM

## 2025-07-01 RX ORDER — AMLODIPINE AND VALSARTAN 5; 160 MG/1; MG/1
0.5 TABLET ORAL DAILY
Qty: 45 TABLET | Refills: 3 | Status: SHIPPED | OUTPATIENT
Start: 2025-07-01

## 2025-07-01 NOTE — TELEPHONE ENCOUNTER
Pending Prescriptions:                       Disp   Refills    amLODIPine-valsartan (EXFORGE) 5-160 MG t*45 tab*3            Sig: Take 0.5 tablets by mouth daily.

## 2025-07-09 NOTE — PROGRESS NOTES
Pre-visit planning and chart review completed.     RETURN patient appointment:    7/24 with Dr. Dk Weber  7/21 CT chest wo contrast    - 3 month follow-up .    CE updated. Medications, allergies, problem list, and immunizations reconciled.

## 2025-07-21 ENCOUNTER — ANCILLARY PROCEDURE (OUTPATIENT)
Dept: CT IMAGING | Facility: CLINIC | Age: 75
End: 2025-07-21
Attending: STUDENT IN AN ORGANIZED HEALTH CARE EDUCATION/TRAINING PROGRAM
Payer: COMMERCIAL

## 2025-07-21 DIAGNOSIS — R91.1 SOLID NODULE OF LUNG GREATER THAN 8 MM IN DIAMETER: ICD-10-CM

## 2025-07-21 PROCEDURE — 71250 CT THORAX DX C-: CPT | Performed by: RADIOLOGY

## 2025-08-25 ENCOUNTER — MYC REFILL (OUTPATIENT)
Dept: FAMILY MEDICINE | Facility: CLINIC | Age: 75
End: 2025-08-25
Payer: COMMERCIAL

## 2025-08-25 DIAGNOSIS — G47.00 PERSISTENT INSOMNIA: ICD-10-CM

## 2025-08-25 RX ORDER — ZOLPIDEM TARTRATE 5 MG/1
5 TABLET ORAL
Qty: 30 TABLET | Refills: 0 | Status: SHIPPED | OUTPATIENT
Start: 2025-08-25

## (undated) DEVICE — SOL NACL 0.9% IRRIG 500ML BOTTLE 2F7123

## (undated) DEVICE — PREP CHLORAPREP 26ML TINTED ORANGE  260815

## (undated) DEVICE — LINEN ORTHO PACK 5446

## (undated) DEVICE — SU ETHILON 5-0 PS-2 18" 1666H

## (undated) DEVICE — PACK HAND CUSTOM ASC

## (undated) DEVICE — GLOVE PROTEXIS BLUE W/NEU-THERA 6.5  2D73EB65

## (undated) DEVICE — GLOVE PROTEXIS POWDER FREE SMT 6.5  2D72PT65X

## (undated) DEVICE — BRUSH SURGICAL SCRUB W/4% CHG SOL 25ML 371073

## (undated) DEVICE — BNDG KLING 2" 2231

## (undated) RX ORDER — LIDOCAINE HYDROCHLORIDE 10 MG/ML
INJECTION, SOLUTION EPIDURAL; INFILTRATION; INTRACAUDAL; PERINEURAL
Status: DISPENSED
Start: 2025-04-17

## (undated) RX ORDER — LIDOCAINE HYDROCHLORIDE AND EPINEPHRINE 10; 10 MG/ML; UG/ML
INJECTION, SOLUTION INFILTRATION; PERINEURAL
Status: DISPENSED
Start: 2018-11-15

## (undated) RX ORDER — ACETAMINOPHEN 325 MG/1
TABLET ORAL
Status: DISPENSED
Start: 2025-04-17

## (undated) RX ORDER — FENTANYL CITRATE 50 UG/ML
INJECTION, SOLUTION INTRAMUSCULAR; INTRAVENOUS
Status: DISPENSED
Start: 2025-04-17